# Patient Record
Sex: MALE | NOT HISPANIC OR LATINO | URBAN - METROPOLITAN AREA
[De-identification: names, ages, dates, MRNs, and addresses within clinical notes are randomized per-mention and may not be internally consistent; named-entity substitution may affect disease eponyms.]

---

## 2018-08-17 ENCOUNTER — EMERGENCY (EMERGENCY)
Facility: HOSPITAL | Age: 58
LOS: 1 days | Discharge: HOME | End: 2018-08-17
Admitting: EMERGENCY MEDICINE
Payer: COMMERCIAL

## 2018-08-17 VITALS
SYSTOLIC BLOOD PRESSURE: 198 MMHG | OXYGEN SATURATION: 98 % | DIASTOLIC BLOOD PRESSURE: 124 MMHG | TEMPERATURE: 98 F | RESPIRATION RATE: 16 BRPM | HEART RATE: 116 BPM | WEIGHT: 220.02 LBS

## 2018-08-17 DIAGNOSIS — R00.0 TACHYCARDIA, UNSPECIFIED: ICD-10-CM

## 2018-08-17 DIAGNOSIS — I10 ESSENTIAL (PRIMARY) HYPERTENSION: ICD-10-CM

## 2018-08-17 DIAGNOSIS — I16.0 HYPERTENSIVE URGENCY: ICD-10-CM

## 2018-08-17 DIAGNOSIS — M27.8 OTHER SPECIFIED DISEASES OF JAWS: ICD-10-CM

## 2018-08-17 LAB
ALBUMIN SERPL ELPH-MCNC: 3.9 G/DL — SIGNIFICANT CHANGE UP (ref 3.4–5)
ALP SERPL-CCNC: 60 U/L — SIGNIFICANT CHANGE UP (ref 40–120)
ALT FLD-CCNC: 31 U/L — SIGNIFICANT CHANGE UP (ref 12–42)
AMYLASE P1 CFR SERPL: 29 U/L — SIGNIFICANT CHANGE UP (ref 25–115)
ANION GAP SERPL CALC-SCNC: 9 MMOL/L — SIGNIFICANT CHANGE UP (ref 9–16)
APPEARANCE UR: CLEAR — SIGNIFICANT CHANGE UP
AST SERPL-CCNC: 26 U/L — SIGNIFICANT CHANGE UP (ref 15–37)
BASOPHILS NFR BLD AUTO: 0.6 % — SIGNIFICANT CHANGE UP (ref 0–2)
BILIRUB SERPL-MCNC: 0.4 MG/DL — SIGNIFICANT CHANGE UP (ref 0.2–1.2)
BILIRUB UR-MCNC: NEGATIVE — SIGNIFICANT CHANGE UP
BUN SERPL-MCNC: 17 MG/DL — SIGNIFICANT CHANGE UP (ref 7–23)
CALCIUM SERPL-MCNC: 8.9 MG/DL — SIGNIFICANT CHANGE UP (ref 8.5–10.5)
CHLORIDE SERPL-SCNC: 105 MMOL/L — SIGNIFICANT CHANGE UP (ref 96–108)
CO2 SERPL-SCNC: 28 MMOL/L — SIGNIFICANT CHANGE UP (ref 22–31)
COLOR SPEC: YELLOW — SIGNIFICANT CHANGE UP
CREAT SERPL-MCNC: 1.21 MG/DL — SIGNIFICANT CHANGE UP (ref 0.5–1.3)
D DIMER BLD IA.RAPID-MCNC: <187 NG/ML DDU — SIGNIFICANT CHANGE UP
DIFF PNL FLD: ABNORMAL
EOSINOPHIL NFR BLD AUTO: 0.6 % — SIGNIFICANT CHANGE UP (ref 0–6)
GLUCOSE SERPL-MCNC: 107 MG/DL — HIGH (ref 70–99)
GLUCOSE UR QL: NEGATIVE — SIGNIFICANT CHANGE UP
HCT VFR BLD CALC: 41.9 % — SIGNIFICANT CHANGE UP (ref 39–50)
HGB BLD-MCNC: 14.7 G/DL — SIGNIFICANT CHANGE UP (ref 13–17)
IMM GRANULOCYTES NFR BLD AUTO: 0.3 % — SIGNIFICANT CHANGE UP (ref 0–1.5)
KETONES UR-MCNC: ABNORMAL MG/DL
LEUKOCYTE ESTERASE UR-ACNC: NEGATIVE — SIGNIFICANT CHANGE UP
LYMPHOCYTES # BLD AUTO: 27.5 % — SIGNIFICANT CHANGE UP (ref 13–44)
MCHC RBC-ENTMCNC: 31.7 PG — SIGNIFICANT CHANGE UP (ref 27–34)
MCHC RBC-ENTMCNC: 35.1 G/DL — SIGNIFICANT CHANGE UP (ref 32–36)
MCV RBC AUTO: 90.3 FL — SIGNIFICANT CHANGE UP (ref 80–100)
MONOCYTES NFR BLD AUTO: 8.3 % — SIGNIFICANT CHANGE UP (ref 2–14)
NEUTROPHILS NFR BLD AUTO: 62.7 % — SIGNIFICANT CHANGE UP (ref 43–77)
NITRITE UR-MCNC: NEGATIVE — SIGNIFICANT CHANGE UP
PH UR: 6 — SIGNIFICANT CHANGE UP (ref 5–8)
PLATELET # BLD AUTO: 230 K/UL — SIGNIFICANT CHANGE UP (ref 150–400)
POTASSIUM SERPL-MCNC: 3.3 MMOL/L — LOW (ref 3.5–5.3)
POTASSIUM SERPL-SCNC: 3.3 MMOL/L — LOW (ref 3.5–5.3)
PROT SERPL-MCNC: 7.9 G/DL — SIGNIFICANT CHANGE UP (ref 6.4–8.2)
PROT UR-MCNC: NEGATIVE MG/DL — SIGNIFICANT CHANGE UP
RBC # BLD: 4.64 M/UL — SIGNIFICANT CHANGE UP (ref 4.2–5.8)
RBC # FLD: 12.5 % — SIGNIFICANT CHANGE UP (ref 10.3–16.9)
SODIUM SERPL-SCNC: 142 MMOL/L — SIGNIFICANT CHANGE UP (ref 132–145)
SP GR SPEC: 1.02 — SIGNIFICANT CHANGE UP (ref 1–1.03)
TROPONIN I SERPL-MCNC: <0.017 NG/ML — LOW (ref 0.02–0.06)
UROBILINOGEN FLD QL: 0.2 E.U./DL — SIGNIFICANT CHANGE UP
WBC # BLD: 7 K/UL — SIGNIFICANT CHANGE UP (ref 3.8–10.5)
WBC # FLD AUTO: 7 K/UL — SIGNIFICANT CHANGE UP (ref 3.8–10.5)

## 2018-08-17 PROCEDURE — 71046 X-RAY EXAM CHEST 2 VIEWS: CPT | Mod: 26

## 2018-08-17 PROCEDURE — 70491 CT SOFT TISSUE NECK W/DYE: CPT | Mod: 26

## 2018-08-17 PROCEDURE — 99285 EMERGENCY DEPT VISIT HI MDM: CPT | Mod: 25

## 2018-08-17 PROCEDURE — 93010 ELECTROCARDIOGRAM REPORT: CPT

## 2018-08-17 RX ORDER — LABETALOL HCL 100 MG
1 TABLET ORAL
Qty: 30 | Refills: 0
Start: 2018-08-17 | End: 2018-08-31

## 2018-08-17 RX ORDER — LABETALOL HCL 100 MG
20 TABLET ORAL ONCE
Qty: 0 | Refills: 0 | Status: COMPLETED | OUTPATIENT
Start: 2018-08-17 | End: 2018-08-17

## 2018-08-17 RX ADMIN — Medication 20 MILLIGRAM(S): at 22:43

## 2018-08-17 NOTE — ED PROVIDER NOTE - DIAGNOSTIC INTERPRETATION
Xray (wet reads) interpreted by MILLIE HENRY   CXR - Cardiac silhouette, aortic knob, mediastinal and hilar contours appear wnl, no acute consolidation, infiltrate, effusion, or PTX. No bony abnormalities noted Xray (wet reads) interpreted by MILLIE HENRY   CXR - Cardiac silhouette, aortic knob, mediastinal and hilar contours appear wnl, +mild LLL opacity, no consolidation, infiltrate, effusion, or PTX. No bony abnormalities noted

## 2018-08-17 NOTE — ED PROVIDER NOTE - OBJECTIVE STATEMENT
59 yo M with PMHx of depression, sent from  for evaluation of elevated BP and R jaw swelling. 59 yo M with PMHx of depression, sent from  for evaluation of elevated BP and R jaw swelling. Pt reports having gradual onset of R jaw swelling with minimal tenderness in the past month after a R lower jaw wisdom tooth extraction almost 2 months ago.  Was seen by private dentist one wk ago, started on amoxicillin for potential seroma with no improvement in sx. Went to  and noted elevated BP and sent in for further evaluation.  Denies fever, chills, CP, palpitations, diaphoresis, CONNER, SOB, orthopnea, cough, hemoptysis, wheezing, peripheral edema, focal weakness, numbness, tingling, paresthesia, HA, dizziness, neck pain, N/V/D/C, abdominal pain, change in urinary/bowel function, trauma, fall, rash, and malaise. No recent travel or sick contact noted.  Pt is adopted and reports no known family history.  Last PMD wellness visit 10 yrs ago

## 2018-08-17 NOTE — ED PROVIDER NOTE - MEDICAL DECISION MAKING DETAILS
AFVSS at time of d/c, pt non-toxic appearing, results, ddx, and f/u plans discussed with pt at bedside, d/c'd home to f/u with PMD, strict return precautions discussed, prompt return to ER for any worsening or new sx, pt verbalized understanding. pt with R jaw mass x 1 month s/p wisdom tooth extraction 2 months ago, afebrile and non toxic appearing, found to be persistently hypertensive in UC and in the ED, no s/s of end organ involvement, EKG with sinus tachycardia, ?anxiety induced vs baseline, improved s/p monitoring and labetalol, CT neck with +collection in the deep soft tissue region in the R jaw, pt has been afebrile and no leukocytosis, already on augmentin by private dentist, likely seroma, encouraged prompt f/u with oral surgeon/dentist for drainage, dc home on course of labetalol and clindamycin, strict return precautions discussed, prompt return to ER for any worsening or new sx, pt verbalized understanding. pt with R jaw mass x 1 month s/p wisdom tooth extraction 2 months ago, afebrile and non toxic appearing, found to be persistently hypertensive in UC and in the ED, no s/s of end organ involvement, EKG with sinus tachycardia, ?anxiety induced vs baseline, improved s/p monitoring and labetalol, CT neck with +collection in the deep soft tissue region in the R jaw, pt has been afebrile and no leukocytosis, already on augmentin by private dentist, unlikely to be abscess, cxr with lesion noted in the L lung base, mandibular region with bony involvement, likely undx malignancy, ddx and f/u plan discussed in detail w pt, encouraged prompt f/u with oral surgeon and PMD, dc home on course of labetalol, strict return precautions discussed, prompt return to ER for any worsening or new sx, pt verbalized understanding.

## 2018-08-17 NOTE — ED PROVIDER NOTE - CARE PLAN
Principal Discharge DX:	Hypertensive urgency Principal Discharge DX:	Hypertensive urgency  Secondary Diagnosis:	Mandibular abscess Principal Discharge DX:	Hypertensive urgency  Secondary Diagnosis:	Mandibular mass

## 2018-08-17 NOTE — ED ADULT TRIAGE NOTE - CHIEF COMPLAINT QUOTE
PT sent in by urgent care for high blood pressure. Pt currently 198/124, denies history of high blood pressure. Denies headache, dizziness, blurry vision. , states he drinks "about a pot of coffee" everyday. Pt also with "bump" on right lower jaw from area where he had a tooth extraction, states he is currently on amoxicillin from his dentist. Denies any fevers or chills.

## 2018-08-17 NOTE — ED PROVIDER NOTE - PHYSICAL EXAMINATION
Vital Signs - nursing notes reviewed and confirmed  Gen - WDWN, NAD, comfortable and non-toxic appearing, speaking in full sentences   Skin - warm, dry, intact  HEENT - AT/NC, PERRL, EOMI, no conjunctival injection, moist oral mucosa, o/p clear with no erythema, edema, or exudate, uvula midline, airway patent, neck supple and NT, FROM, no JVD or carotid bruits b/l, +R submandibular palpable mass, semi firm, mobile, no focal fluctuance, d/c, bleeding, warmth or tenderness   CV - S1S2, rapid rate, regular rhythm, no m/r/g   Resp - respiration non-labored, CTAB, symmetric bs b/l, no r/r/w  GI - NABS, soft, ND, NT, no rebound or guarding, no CVAT b/l   MS - w/w/p, no c/c/e, calves supple and NT, distal pulses symmetric b/l, brisk cap refills   Neuro - AxOx3, no focal neuro deficits, CN II-XII grossly intact, cerebellar function intact, negative pronator drift, negative nystagmus, ambulatory without gait disturbance

## 2018-08-18 ENCOUNTER — RESULT REVIEW (OUTPATIENT)
Age: 58
End: 2018-08-18

## 2018-08-18 VITALS
SYSTOLIC BLOOD PRESSURE: 170 MMHG | RESPIRATION RATE: 18 BRPM | OXYGEN SATURATION: 96 % | DIASTOLIC BLOOD PRESSURE: 108 MMHG | HEART RATE: 94 BPM

## 2018-08-18 PROCEDURE — 71260 CT THORAX DX C+: CPT | Mod: 26

## 2018-08-20 PROBLEM — F32.9 MAJOR DEPRESSIVE DISORDER, SINGLE EPISODE, UNSPECIFIED: Chronic | Status: ACTIVE | Noted: 2018-08-17

## 2018-08-27 ENCOUNTER — TRANSCRIPTION ENCOUNTER (OUTPATIENT)
Age: 58
End: 2018-08-27

## 2018-08-27 ENCOUNTER — NON-APPOINTMENT (OUTPATIENT)
Age: 58
End: 2018-08-27

## 2018-08-27 ENCOUNTER — APPOINTMENT (OUTPATIENT)
Dept: INTERNAL MEDICINE | Facility: CLINIC | Age: 58
End: 2018-08-27
Payer: COMMERCIAL

## 2018-08-27 VITALS
TEMPERATURE: 98 F | DIASTOLIC BLOOD PRESSURE: 105 MMHG | WEIGHT: 229.25 LBS | HEART RATE: 117 BPM | BODY MASS INDEX: 29.74 KG/M2 | OXYGEN SATURATION: 95 % | SYSTOLIC BLOOD PRESSURE: 183 MMHG | HEIGHT: 73.5 IN

## 2018-08-27 VITALS — DIASTOLIC BLOOD PRESSURE: 100 MMHG | SYSTOLIC BLOOD PRESSURE: 160 MMHG

## 2018-08-27 DIAGNOSIS — Z78.9 OTHER SPECIFIED HEALTH STATUS: ICD-10-CM

## 2018-08-27 PROCEDURE — 93000 ELECTROCARDIOGRAM COMPLETE: CPT

## 2018-08-27 PROCEDURE — 99204 OFFICE O/P NEW MOD 45 MIN: CPT | Mod: 25

## 2018-08-27 RX ORDER — AMOXICILLIN 500 MG/1
500 CAPSULE ORAL
Refills: 0 | Status: COMPLETED | COMMUNITY

## 2018-08-27 NOTE — PHYSICAL EXAM
[No Acute Distress] : no acute distress [Well Nourished] : well nourished [Well Developed] : well developed [Well-Appearing] : well-appearing [Normal Sclera/Conjunctiva] : normal sclera/conjunctiva [PERRL] : pupils equal round and reactive to light [EOMI] : extraocular movements intact [Normal Outer Ear/Nose] : the outer ears and nose were normal in appearance [Normal Oropharynx] : the oropharynx was normal [No JVD] : no jugular venous distention [Supple] : supple [No Lymphadenopathy] : no lymphadenopathy [Thyroid Normal, No Nodules] : the thyroid was normal and there were no nodules present [No Respiratory Distress] : no respiratory distress  [Clear to Auscultation] : lungs were clear to auscultation bilaterally [No Accessory Muscle Use] : no accessory muscle use [Normal Rate] : normal rate  [Regular Rhythm] : with a regular rhythm [Normal S1, S2] : normal S1 and S2 [No Murmur] : no murmur heard [No Carotid Bruits] : no carotid bruits [No Abdominal Bruit] : a ~M bruit was not heard ~T in the abdomen [No Edema] : there was no peripheral edema [Soft] : abdomen soft [Non Tender] : non-tender [Non-distended] : non-distended [No Masses] : no abdominal mass palpated [No HSM] : no HSM [Normal Bowel Sounds] : normal bowel sounds [Normal Posterior Cervical Nodes] : no posterior cervical lymphadenopathy [Normal Anterior Cervical Nodes] : no anterior cervical lymphadenopathy [No CVA Tenderness] : no CVA  tenderness [No Spinal Tenderness] : no spinal tenderness [No Joint Swelling] : no joint swelling [Grossly Normal Strength/Tone] : grossly normal strength/tone [No Rash] : no rash [Normal Gait] : normal gait [Coordination Grossly Intact] : coordination grossly intact [No Focal Deficits] : no focal deficits [Deep Tendon Reflexes (DTR)] : deep tendon reflexes were 2+ and symmetric [Normal Affect] : the affect was normal [Normal Insight/Judgement] : insight and judgment were intact [de-identified] : right molar teeth incision scar [de-identified] : right submandibular mass present larger than 4 cm

## 2018-08-27 NOTE — DATA REVIEWED
[FreeTextEntry1] : CT Head\par Destructive lytic lesion centered in angle of the right mandible with  associated permeative destructive changes as well as surrounding soft  tissue density, concerning for malignancy. No defined fluid collection is  seen.\par \par CT Chest:\par 1.5 x 0.9 cm pleural nodule left upper lobe posteriorly concerning for  metastatic lesion. 2.6 cm lingular soft tissue nodule metastatic or primary lung

## 2018-08-27 NOTE — REVIEW OF SYSTEMS
[Fatigue] : no fatigue [Night Sweats] : no night sweats [Recent Change In Weight] : ~T no recent weight change [Anxiety] : anxiety [Negative] : Heme/Lymph [FreeTextEntry4] : right mandibular lesion

## 2018-08-27 NOTE — HISTORY OF PRESENT ILLNESS
[FreeTextEntry8] : 58 year old male patient came in to establish care and for coordination of care of new health issues. \par \par Patient was recently evaluated by his dentist for a cheek lesion on the right side. he was given an antibiotic initially, he later went into the urgent care for second evaluation. he was later sent to ED for the cheek lesion and hypertension. in the Ed he received a CT Scan of the Head which he was told was reassuring.  He also had a chest Xray in the emergency department that showed a nodule in the lung. he later got a follow up study with a CT Scan which came back suspicious for malignancy lesions. \par \par After discharge he went to see a maxiofascial surgeon and had a bone biopsy of the mandible per recommendation of his dentist. Patient has an appt coming up tomorrow with the surgeon to discuss results. \par \par Of note throughout the visits with these healthcare providers patient is told that he had elevated blood pressure. Patient is not aware that he had this issue before. he denies headaches, vision disturbances. \par \par Patient reports he has anxiety and he is going through therapy to control symptoms. He admits of having good social support.  \par

## 2018-08-29 ENCOUNTER — TRANSCRIPTION ENCOUNTER (OUTPATIENT)
Age: 58
End: 2018-08-29

## 2018-08-29 ENCOUNTER — APPOINTMENT (OUTPATIENT)
Dept: OTOLARYNGOLOGY | Facility: CLINIC | Age: 58
End: 2018-08-29
Payer: COMMERCIAL

## 2018-08-29 ENCOUNTER — APPOINTMENT (OUTPATIENT)
Dept: PULMONOLOGY | Facility: CLINIC | Age: 58
End: 2018-08-29
Payer: COMMERCIAL

## 2018-08-29 VITALS
OXYGEN SATURATION: 94 % | HEIGHT: 73 IN | DIASTOLIC BLOOD PRESSURE: 90 MMHG | BODY MASS INDEX: 29.82 KG/M2 | HEART RATE: 107 BPM | WEIGHT: 225 LBS | TEMPERATURE: 98.4 F | SYSTOLIC BLOOD PRESSURE: 147 MMHG

## 2018-08-29 VITALS
OXYGEN SATURATION: 94 % | SYSTOLIC BLOOD PRESSURE: 147 MMHG | DIASTOLIC BLOOD PRESSURE: 90 MMHG | WEIGHT: 225 LBS | BODY MASS INDEX: 29.82 KG/M2 | TEMPERATURE: 98.4 F | HEART RATE: 107 BPM | HEIGHT: 73 IN

## 2018-08-29 PROCEDURE — 31231 NASAL ENDOSCOPY DX: CPT

## 2018-08-29 PROCEDURE — 99205 OFFICE O/P NEW HI 60 MIN: CPT | Mod: 25

## 2018-08-29 PROCEDURE — 99244 OFF/OP CNSLTJ NEW/EST MOD 40: CPT

## 2018-08-30 ENCOUNTER — TRANSCRIPTION ENCOUNTER (OUTPATIENT)
Age: 58
End: 2018-08-30

## 2018-09-01 ENCOUNTER — TRANSCRIPTION ENCOUNTER (OUTPATIENT)
Age: 58
End: 2018-09-01

## 2018-09-04 ENCOUNTER — TRANSCRIPTION ENCOUNTER (OUTPATIENT)
Age: 58
End: 2018-09-04

## 2018-09-04 ENCOUNTER — APPOINTMENT (OUTPATIENT)
Dept: OTOLARYNGOLOGY | Facility: CLINIC | Age: 58
End: 2018-09-04
Payer: COMMERCIAL

## 2018-09-04 VITALS
SYSTOLIC BLOOD PRESSURE: 153 MMHG | WEIGHT: 225 LBS | DIASTOLIC BLOOD PRESSURE: 93 MMHG | HEIGHT: 73 IN | BODY MASS INDEX: 29.82 KG/M2 | TEMPERATURE: 98.8 F | OXYGEN SATURATION: 95 % | HEART RATE: 108 BPM

## 2018-09-04 PROCEDURE — 99215 OFFICE O/P EST HI 40 MIN: CPT

## 2018-09-05 ENCOUNTER — OUTPATIENT (OUTPATIENT)
Dept: OUTPATIENT SERVICES | Facility: HOSPITAL | Age: 58
LOS: 1 days | End: 2018-09-05
Payer: COMMERCIAL

## 2018-09-05 ENCOUNTER — OUTPATIENT (OUTPATIENT)
Dept: OUTPATIENT SERVICES | Facility: HOSPITAL | Age: 58
LOS: 1 days | Discharge: HOME | End: 2018-09-05

## 2018-09-05 ENCOUNTER — LABORATORY RESULT (OUTPATIENT)
Age: 58
End: 2018-09-05

## 2018-09-05 DIAGNOSIS — C43.39 MALIGNANT MELANOMA OF OTHER PARTS OF FACE: ICD-10-CM

## 2018-09-05 PROCEDURE — 82962 GLUCOSE BLOOD TEST: CPT

## 2018-09-05 PROCEDURE — A9552: CPT

## 2018-09-05 PROCEDURE — 78815 PET IMAGE W/CT SKULL-THIGH: CPT

## 2018-09-05 PROCEDURE — 78815 PET IMAGE W/CT SKULL-THIGH: CPT | Mod: 26

## 2018-09-06 ENCOUNTER — APPOINTMENT (OUTPATIENT)
Dept: DERMATOLOGY | Facility: CLINIC | Age: 58
End: 2018-09-06
Payer: COMMERCIAL

## 2018-09-06 VITALS — SYSTOLIC BLOOD PRESSURE: 140 MMHG | DIASTOLIC BLOOD PRESSURE: 100 MMHG

## 2018-09-06 PROCEDURE — 99203 OFFICE O/P NEW LOW 30 MIN: CPT | Mod: 25

## 2018-09-06 PROCEDURE — 11100 BX SKIN SUBCUTANEOUS&/MUCOUS MEMBRANE 1 LESION: CPT

## 2018-09-06 PROCEDURE — 11101 BIOPSY SKIN SUBQ&/MUCOUS MEMBRANE EA ADDL LESN: CPT

## 2018-09-12 ENCOUNTER — TRANSCRIPTION ENCOUNTER (OUTPATIENT)
Age: 58
End: 2018-09-12

## 2018-09-13 ENCOUNTER — TRANSCRIPTION ENCOUNTER (OUTPATIENT)
Age: 58
End: 2018-09-13

## 2018-09-14 ENCOUNTER — TRANSCRIPTION ENCOUNTER (OUTPATIENT)
Age: 58
End: 2018-09-14

## 2018-09-14 ENCOUNTER — APPOINTMENT (OUTPATIENT)
Dept: HEMATOLOGY ONCOLOGY | Facility: CLINIC | Age: 58
End: 2018-09-14
Payer: COMMERCIAL

## 2018-09-14 VITALS
OXYGEN SATURATION: 98 % | BODY MASS INDEX: 29.48 KG/M2 | HEIGHT: 73.23 IN | RESPIRATION RATE: 16 BRPM | WEIGHT: 224.87 LBS | DIASTOLIC BLOOD PRESSURE: 100 MMHG | TEMPERATURE: 98.8 F | SYSTOLIC BLOOD PRESSURE: 160 MMHG | HEART RATE: 101 BPM

## 2018-09-14 PROCEDURE — 99245 OFF/OP CONSLTJ NEW/EST HI 55: CPT

## 2018-09-17 ENCOUNTER — TRANSCRIPTION ENCOUNTER (OUTPATIENT)
Age: 58
End: 2018-09-17

## 2018-09-17 ENCOUNTER — CHART COPY (OUTPATIENT)
Age: 58
End: 2018-09-17

## 2018-09-17 ENCOUNTER — APPOINTMENT (OUTPATIENT)
Dept: INTERNAL MEDICINE | Facility: CLINIC | Age: 58
End: 2018-09-17
Payer: COMMERCIAL

## 2018-09-17 VITALS
OXYGEN SATURATION: 97 % | TEMPERATURE: 98.8 F | HEART RATE: 162 BPM | DIASTOLIC BLOOD PRESSURE: 80 MMHG | HEIGHT: 73.23 IN | SYSTOLIC BLOOD PRESSURE: 140 MMHG | BODY MASS INDEX: 29.37 KG/M2 | WEIGHT: 224 LBS

## 2018-09-17 PROCEDURE — 99214 OFFICE O/P EST MOD 30 MIN: CPT

## 2018-09-18 ENCOUNTER — APPOINTMENT (OUTPATIENT)
Dept: PULMONOLOGY | Facility: CLINIC | Age: 58
End: 2018-09-18
Payer: COMMERCIAL

## 2018-09-18 ENCOUNTER — APPOINTMENT (OUTPATIENT)
Dept: OTOLARYNGOLOGY | Facility: CLINIC | Age: 58
End: 2018-09-18
Payer: COMMERCIAL

## 2018-09-18 VITALS
OXYGEN SATURATION: 95 % | BODY MASS INDEX: 25.92 KG/M2 | HEART RATE: 107 BPM | WEIGHT: 224 LBS | DIASTOLIC BLOOD PRESSURE: 89 MMHG | HEIGHT: 78 IN | TEMPERATURE: 98.3 F | SYSTOLIC BLOOD PRESSURE: 160 MMHG

## 2018-09-18 VITALS
HEIGHT: 78 IN | WEIGHT: 224 LBS | TEMPERATURE: 98.9 F | HEART RATE: 110 BPM | BODY MASS INDEX: 25.92 KG/M2 | OXYGEN SATURATION: 93 %

## 2018-09-18 DIAGNOSIS — J38.3 OTHER DISEASES OF VOCAL CORDS: ICD-10-CM

## 2018-09-18 PROCEDURE — 99214 OFFICE O/P EST MOD 30 MIN: CPT

## 2018-09-18 NOTE — PHYSICAL EXAM
[No Acute Distress] : no acute distress [Well Nourished] : well nourished [Normal Sclera/Conjunctiva] : normal sclera/conjunctiva [PERRL] : pupils equal round and reactive to light [No JVD] : no jugular venous distention [Supple] : supple [No Respiratory Distress] : no respiratory distress  [No Accessory Muscle Use] : no accessory muscle use [Normal Rate] : normal rate  [Regular Rhythm] : with a regular rhythm [No Joint Swelling] : no joint swelling [Grossly Normal Strength/Tone] : grossly normal strength/tone [No Rash] : no rash [Normal Gait] : normal gait [Coordination Grossly Intact] : coordination grossly intact [Normal Affect] : the affect was normal [Normal Insight/Judgement] : insight and judgment were intact

## 2018-09-18 NOTE — HISTORY OF PRESENT ILLNESS
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 \par \par Patient was seen at Dr Barnes''s office. He is offered treatment modalities. Per self report he is offered immunotherapy or surgery and combination. Patient is still doing his research and weighing cons and pros of the choices and planning to make a decision. \par \par Patient had two biopsies of skin lesion of his face and right arm which both came benign and not likely representing the source.\par \par Patient had lesions on his lungs which were suspicious for metastatic lesions but PET scan results were not clear on etiology. He was recommended by Pulmonary and Hematology to obtain tissue diagnosis to identify these lesions. Patient has an upcoming appointment with Dr Scott for evaluation and is concerned if he needs to have surgery to access these lesions. \par \par PAtient otherwise reports he is in good spirits. he admits anxiety but is eager to start the treatment process. \par \par patient had a PET scan done.

## 2018-09-19 ENCOUNTER — APPOINTMENT (OUTPATIENT)
Dept: OTOLARYNGOLOGY | Facility: CLINIC | Age: 58
End: 2018-09-19
Payer: COMMERCIAL

## 2018-09-19 VITALS
WEIGHT: 224 LBS | DIASTOLIC BLOOD PRESSURE: 100 MMHG | TEMPERATURE: 99 F | SYSTOLIC BLOOD PRESSURE: 148 MMHG | BODY MASS INDEX: 29.69 KG/M2 | HEIGHT: 73 IN | HEART RATE: 111 BPM | OXYGEN SATURATION: 96 %

## 2018-09-19 PROCEDURE — 99213 OFFICE O/P EST LOW 20 MIN: CPT

## 2018-09-28 ENCOUNTER — RESULT REVIEW (OUTPATIENT)
Age: 58
End: 2018-09-28

## 2018-09-28 ENCOUNTER — OUTPATIENT (OUTPATIENT)
Dept: OUTPATIENT SERVICES | Facility: HOSPITAL | Age: 58
LOS: 1 days | Discharge: HOME | End: 2018-09-28
Payer: COMMERCIAL

## 2018-09-28 ENCOUNTER — APPOINTMENT (OUTPATIENT)
Dept: HEMATOLOGY ONCOLOGY | Facility: CLINIC | Age: 58
End: 2018-09-28
Payer: COMMERCIAL

## 2018-09-28 PROCEDURE — 31628 BRONCHOSCOPY/LUNG BX EACH: CPT

## 2018-09-28 PROCEDURE — 71045 X-RAY EXAM CHEST 1 VIEW: CPT | Mod: 26

## 2018-09-28 PROCEDURE — 31652 BRONCH EBUS SAMPLNG 1/2 NODE: CPT

## 2018-09-28 PROCEDURE — 88173 CYTOPATH EVAL FNA REPORT: CPT

## 2018-09-28 PROCEDURE — 71045 X-RAY EXAM CHEST 1 VIEW: CPT

## 2018-09-28 PROCEDURE — 31624 DX BRONCHOSCOPE/LAVAGE: CPT | Mod: GC

## 2018-09-28 PROCEDURE — 88305 TISSUE EXAM BY PATHOLOGIST: CPT

## 2018-09-28 PROCEDURE — 31654 BRONCH EBUS IVNTJ PERPH LES: CPT | Mod: GC

## 2018-09-28 PROCEDURE — 88341 IMHCHEM/IMCYTCHM EA ADD ANTB: CPT

## 2018-09-28 PROCEDURE — 88112 CYTOPATH CELL ENHANCE TECH: CPT

## 2018-09-28 PROCEDURE — 31627 NAVIGATIONAL BRONCHOSCOPY: CPT | Mod: GC

## 2018-09-28 PROCEDURE — 31652 BRONCH EBUS SAMPLNG 1/2 NODE: CPT | Mod: GC

## 2018-09-28 PROCEDURE — 31625 BRONCHOSCOPY W/BIOPSY(S): CPT | Mod: GC

## 2018-10-01 ENCOUNTER — RESULT REVIEW (OUTPATIENT)
Age: 58
End: 2018-10-01

## 2018-10-01 ENCOUNTER — OUTPATIENT (OUTPATIENT)
Dept: OUTPATIENT SERVICES | Facility: HOSPITAL | Age: 58
LOS: 1 days | End: 2018-10-01

## 2018-10-01 ENCOUNTER — APPOINTMENT (OUTPATIENT)
Dept: THORACIC SURGERY | Facility: CLINIC | Age: 58
End: 2018-10-01
Payer: COMMERCIAL

## 2018-10-01 VITALS
DIASTOLIC BLOOD PRESSURE: 99 MMHG | WEIGHT: 217 LBS | HEART RATE: 118 BPM | TEMPERATURE: 98.8 F | OXYGEN SATURATION: 96 % | BODY MASS INDEX: 28.76 KG/M2 | HEIGHT: 73 IN | SYSTOLIC BLOOD PRESSURE: 151 MMHG | RESPIRATION RATE: 16 BRPM

## 2018-10-01 DIAGNOSIS — C41.1 MALIGNANT NEOPLASM OF MANDIBLE: ICD-10-CM

## 2018-10-01 LAB — SURGICAL PATHOLOGY STUDY: SIGNIFICANT CHANGE UP

## 2018-10-01 PROCEDURE — 99245 OFF/OP CONSLTJ NEW/EST HI 55: CPT

## 2018-10-02 ENCOUNTER — APPOINTMENT (OUTPATIENT)
Dept: HEMATOLOGY ONCOLOGY | Facility: CLINIC | Age: 58
End: 2018-10-02
Payer: COMMERCIAL

## 2018-10-02 ENCOUNTER — RESULT REVIEW (OUTPATIENT)
Age: 58
End: 2018-10-02

## 2018-10-02 VITALS
TEMPERATURE: 99 F | HEART RATE: 106 BPM | WEIGHT: 218.25 LBS | DIASTOLIC BLOOD PRESSURE: 105 MMHG | OXYGEN SATURATION: 97 % | SYSTOLIC BLOOD PRESSURE: 167 MMHG | BODY MASS INDEX: 28.79 KG/M2 | RESPIRATION RATE: 17 BRPM

## 2018-10-02 DIAGNOSIS — Z86.79 PERSONAL HISTORY OF OTHER DISEASES OF THE CIRCULATORY SYSTEM: ICD-10-CM

## 2018-10-02 DIAGNOSIS — Z82.49 FAMILY HISTORY OF ISCHEMIC HEART DISEASE AND OTHER DISEASES OF THE CIRCULATORY SYSTEM: ICD-10-CM

## 2018-10-02 LAB
HCT VFR BLD CALC: 43.1 % — SIGNIFICANT CHANGE UP (ref 39–50)
HGB BLD-MCNC: 15.1 G/DL — SIGNIFICANT CHANGE UP (ref 13–17)
MCHC RBC-ENTMCNC: 32.2 PG — SIGNIFICANT CHANGE UP (ref 27–34)
MCHC RBC-ENTMCNC: 35 G/DL — SIGNIFICANT CHANGE UP (ref 32–36)
MCV RBC AUTO: 92.3 FL — SIGNIFICANT CHANGE UP (ref 80–100)
PLATELET # BLD AUTO: 217 K/UL — SIGNIFICANT CHANGE UP (ref 150–400)
RBC # BLD: 4.68 M/UL — SIGNIFICANT CHANGE UP (ref 4.2–5.8)
RBC # FLD: 11.7 % — SIGNIFICANT CHANGE UP (ref 10.3–14.5)
WBC # BLD: 7 K/UL — SIGNIFICANT CHANGE UP (ref 3.8–10.5)
WBC # FLD AUTO: 7 K/UL — SIGNIFICANT CHANGE UP (ref 3.8–10.5)

## 2018-10-02 PROCEDURE — 99215 OFFICE O/P EST HI 40 MIN: CPT

## 2018-10-03 ENCOUNTER — OUTPATIENT (OUTPATIENT)
Dept: OUTPATIENT SERVICES | Facility: HOSPITAL | Age: 58
LOS: 1 days | Discharge: HOME | End: 2018-10-03

## 2018-10-03 DIAGNOSIS — C43.39 MALIGNANT MELANOMA OF OTHER PARTS OF FACE: ICD-10-CM

## 2018-10-03 LAB
ALBUMIN SERPL ELPH-MCNC: 4.5 G/DL
ALP BLD-CCNC: 64 U/L
ALT SERPL-CCNC: 20 U/L
ANION GAP SERPL CALC-SCNC: 13 MMOL/L
AST SERPL-CCNC: 19 U/L
BILIRUB SERPL-MCNC: 0.4 MG/DL
BUN SERPL-MCNC: 16 MG/DL
CALCIUM SERPL-MCNC: 9.5 MG/DL
CHLORIDE SERPL-SCNC: 101 MMOL/L
CO2 SERPL-SCNC: 26 MMOL/L
CREAT SERPL-MCNC: 1.06 MG/DL
GLUCOSE SERPL-MCNC: 104 MG/DL
HAV IGM SER QL: NONREACTIVE
HBV CORE IGM SER QL: NONREACTIVE
HBV SURFACE AG SER QL: NONREACTIVE
HCV AB SER QL: NONREACTIVE
HCV S/CO RATIO: 0.2 S/CO
POTASSIUM SERPL-SCNC: 4.3 MMOL/L
PROT SERPL-MCNC: 7 G/DL
SODIUM SERPL-SCNC: 140 MMOL/L
T3FREE SERPL-MCNC: 3.74 PG/ML
T4 FREE SERPL-MCNC: 1.7 NG/DL
TSH SERPL-ACNC: 0.7 UIU/ML

## 2018-10-08 LAB
CORTICOSTEROID BIND GLOBULIN: 2.8 MG/DL
CORTIS SERPL-MCNC: 14 UG/DL
CORTISOL, FREE: 0.79 UG/DL
PFCX: 5.6 %

## 2018-10-11 ENCOUNTER — APPOINTMENT (OUTPATIENT)
Dept: INFUSION THERAPY | Facility: HOSPITAL | Age: 58
End: 2018-10-11

## 2018-10-12 DIAGNOSIS — R11.2 NAUSEA WITH VOMITING, UNSPECIFIED: ICD-10-CM

## 2018-10-12 DIAGNOSIS — C43.9 MALIGNANT MELANOMA OF SKIN, UNSPECIFIED: ICD-10-CM

## 2018-10-12 DIAGNOSIS — Z51.11 ENCOUNTER FOR ANTINEOPLASTIC CHEMOTHERAPY: ICD-10-CM

## 2018-10-26 ENCOUNTER — LABORATORY RESULT (OUTPATIENT)
Age: 58
End: 2018-10-26

## 2018-10-26 ENCOUNTER — APPOINTMENT (OUTPATIENT)
Dept: HEMATOLOGY ONCOLOGY | Facility: CLINIC | Age: 58
End: 2018-10-26
Payer: COMMERCIAL

## 2018-10-26 ENCOUNTER — RESULT REVIEW (OUTPATIENT)
Age: 58
End: 2018-10-26

## 2018-10-26 VITALS
TEMPERATURE: 98.4 F | DIASTOLIC BLOOD PRESSURE: 90 MMHG | BODY MASS INDEX: 28.65 KG/M2 | RESPIRATION RATE: 16 BRPM | SYSTOLIC BLOOD PRESSURE: 152 MMHG | OXYGEN SATURATION: 95 % | WEIGHT: 217.18 LBS | HEART RATE: 102 BPM

## 2018-10-26 LAB
BASOPHILS # BLD AUTO: 0.2 K/UL — SIGNIFICANT CHANGE UP (ref 0–0.2)
BASOPHILS NFR BLD AUTO: 2.4 % — HIGH (ref 0–2)
EOSINOPHIL # BLD AUTO: 0.1 K/UL — SIGNIFICANT CHANGE UP (ref 0–0.5)
EOSINOPHIL NFR BLD AUTO: 0.9 % — SIGNIFICANT CHANGE UP (ref 0–6)
HCT VFR BLD CALC: 39.9 % — SIGNIFICANT CHANGE UP (ref 39–50)
HGB BLD-MCNC: 14.2 G/DL — SIGNIFICANT CHANGE UP (ref 13–17)
LYMPHOCYTES # BLD AUTO: 1.3 K/UL — SIGNIFICANT CHANGE UP (ref 1–3.3)
LYMPHOCYTES # BLD AUTO: 18.4 % — SIGNIFICANT CHANGE UP (ref 13–44)
MCHC RBC-ENTMCNC: 32.7 PG — SIGNIFICANT CHANGE UP (ref 27–34)
MCHC RBC-ENTMCNC: 35.6 G/DL — SIGNIFICANT CHANGE UP (ref 32–36)
MCV RBC AUTO: 91.7 FL — SIGNIFICANT CHANGE UP (ref 80–100)
MONOCYTES # BLD AUTO: 0.5 K/UL — SIGNIFICANT CHANGE UP (ref 0–0.9)
MONOCYTES NFR BLD AUTO: 7.1 % — SIGNIFICANT CHANGE UP (ref 2–14)
NEUTROPHILS # BLD AUTO: 5.1 K/UL — SIGNIFICANT CHANGE UP (ref 1.8–7.4)
NEUTROPHILS NFR BLD AUTO: 71.1 % — SIGNIFICANT CHANGE UP (ref 43–77)
PLATELET # BLD AUTO: 219 K/UL — SIGNIFICANT CHANGE UP (ref 150–400)
RBC # BLD: 4.35 M/UL — SIGNIFICANT CHANGE UP (ref 4.2–5.8)
RBC # FLD: 11.6 % — SIGNIFICANT CHANGE UP (ref 10.3–14.5)
WBC # BLD: 7.2 K/UL — SIGNIFICANT CHANGE UP (ref 3.8–10.5)
WBC # FLD AUTO: 7.2 K/UL — SIGNIFICANT CHANGE UP (ref 3.8–10.5)

## 2018-10-26 PROCEDURE — 99214 OFFICE O/P EST MOD 30 MIN: CPT

## 2018-10-30 LAB
ALBUMIN SERPL ELPH-MCNC: 4.2 G/DL
ALP BLD-CCNC: 63 U/L
ALT SERPL-CCNC: 28 U/L
AMYLASE/CREAT SERPL: 30 U/L
ANION GAP SERPL CALC-SCNC: 12 MMOL/L
AST SERPL-CCNC: 24 U/L
BILIRUB SERPL-MCNC: 0.3 MG/DL
BUN SERPL-MCNC: 15 MG/DL
CALCIUM SERPL-MCNC: 8.8 MG/DL
CHLORIDE SERPL-SCNC: 103 MMOL/L
CO2 SERPL-SCNC: 27 MMOL/L
CREAT SERPL-MCNC: 0.98 MG/DL
POTASSIUM SERPL-SCNC: 3.4 MMOL/L
PROT SERPL-MCNC: 6.8 G/DL
SODIUM SERPL-SCNC: 142 MMOL/L
T3RU NFR SERPL: 1.11 INDEX
T4 SERPL-MCNC: 7.6 UG/DL
TESTOST SERPL-MCNC: 409.1 NG/DL

## 2018-10-31 LAB
CORTICOSTEROID BIND GLOBULIN: 2.7 MG/DL
CORTIS SERPL-MCNC: 21 UG/DL
CORTISOL, FREE: 4 UG/DL
PFCX: 19 %

## 2018-11-01 ENCOUNTER — APPOINTMENT (OUTPATIENT)
Dept: INFUSION THERAPY | Facility: HOSPITAL | Age: 58
End: 2018-11-01

## 2018-11-07 ENCOUNTER — OUTPATIENT (OUTPATIENT)
Dept: OUTPATIENT SERVICES | Facility: HOSPITAL | Age: 58
LOS: 1 days | Discharge: HOME | End: 2018-11-07

## 2018-11-07 DIAGNOSIS — C43.39 MALIGNANT MELANOMA OF OTHER PARTS OF FACE: ICD-10-CM

## 2018-11-16 ENCOUNTER — RESULT REVIEW (OUTPATIENT)
Age: 58
End: 2018-11-16

## 2018-11-16 ENCOUNTER — APPOINTMENT (OUTPATIENT)
Dept: HEMATOLOGY ONCOLOGY | Facility: CLINIC | Age: 58
End: 2018-11-16
Payer: COMMERCIAL

## 2018-11-16 VITALS
TEMPERATURE: 98.3 F | SYSTOLIC BLOOD PRESSURE: 137 MMHG | OXYGEN SATURATION: 97 % | HEART RATE: 108 BPM | DIASTOLIC BLOOD PRESSURE: 83 MMHG | RESPIRATION RATE: 16 BRPM | BODY MASS INDEX: 28.53 KG/M2 | WEIGHT: 216.27 LBS

## 2018-11-16 DIAGNOSIS — Z82.49 FAMILY HISTORY OF ISCHEMIC HEART DISEASE AND OTHER DISEASES OF THE CIRCULATORY SYSTEM: ICD-10-CM

## 2018-11-16 DIAGNOSIS — Z87.09 PERSONAL HISTORY OF OTHER DISEASES OF THE RESPIRATORY SYSTEM: ICD-10-CM

## 2018-11-16 DIAGNOSIS — Z80.9 FAMILY HISTORY OF MALIGNANT NEOPLASM, UNSPECIFIED: ICD-10-CM

## 2018-11-16 DIAGNOSIS — J18.9 PNEUMONIA, UNSPECIFIED ORGANISM: ICD-10-CM

## 2018-11-16 DIAGNOSIS — Z85.9 PERSONAL HISTORY OF MALIGNANT NEOPLASM, UNSPECIFIED: ICD-10-CM

## 2018-11-16 LAB
ALBUMIN SERPL ELPH-MCNC: 4.2 G/DL
ALP BLD-CCNC: 71 U/L
ALT SERPL-CCNC: 101 U/L
ANION GAP SERPL CALC-SCNC: 12 MMOL/L
AST SERPL-CCNC: 55 U/L
BILIRUB SERPL-MCNC: 0.4 MG/DL
BUN SERPL-MCNC: 15 MG/DL
CALCIUM SERPL-MCNC: 9.1 MG/DL
CHLORIDE SERPL-SCNC: 104 MMOL/L
CO2 SERPL-SCNC: 27 MMOL/L
CREAT SERPL-MCNC: 0.99 MG/DL
GLUCOSE SERPL-MCNC: 127 MG/DL
HCT VFR BLD CALC: 40.6 % — SIGNIFICANT CHANGE UP (ref 39–50)
HGB BLD-MCNC: 14.6 G/DL — SIGNIFICANT CHANGE UP (ref 13–17)
MCHC RBC-ENTMCNC: 32.9 PG — SIGNIFICANT CHANGE UP (ref 27–34)
MCHC RBC-ENTMCNC: 35.9 G/DL — SIGNIFICANT CHANGE UP (ref 32–36)
MCV RBC AUTO: 91.6 FL — SIGNIFICANT CHANGE UP (ref 80–100)
PLATELET # BLD AUTO: 187 K/UL — SIGNIFICANT CHANGE UP (ref 150–400)
POTASSIUM SERPL-SCNC: 3.8 MMOL/L
PROT SERPL-MCNC: 6.7 G/DL
RBC # BLD: 4.43 M/UL — SIGNIFICANT CHANGE UP (ref 4.2–5.8)
RBC # FLD: 12 % — SIGNIFICANT CHANGE UP (ref 10.3–14.5)
SODIUM SERPL-SCNC: 143 MMOL/L
WBC # BLD: 6.7 K/UL — SIGNIFICANT CHANGE UP (ref 3.8–10.5)
WBC # FLD AUTO: 6.7 K/UL — SIGNIFICANT CHANGE UP (ref 3.8–10.5)

## 2018-11-16 PROCEDURE — 99214 OFFICE O/P EST MOD 30 MIN: CPT

## 2018-11-16 NOTE — PHYSICAL EXAM
[Fully active, able to carry on all pre-disease performance without restriction] : Status 0 - Fully active, able to carry on all pre-disease performance without restriction [Normal] : affect appropriate [de-identified] : right sided neck mass measures 3 cm X 3 cm

## 2018-11-16 NOTE — ASSESSMENT
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 I do not find any intra oral  or sublingual lesions. The right jaw swelling is considerably less. I palpate a nodule approximately 2 cm X 2 cm subcutaneously in the area rostral to the edge of the mandible.He appeared well today and his physical examination findings noted a favorable response to treatment; he will proceed with his 2 remaining cycles.Patient is also noted to be c kit positive, imatinib therapy was briefly discussed but he will remain on immunotherapy as directed.  Plan to request imaging studies in the last week of December 2018. Blood studies done today. Return to the office in 3 weeks. Seen and examined in conjunction with Mariah SHELBY

## 2018-11-16 NOTE — HISTORY OF PRESENT ILLNESS
[Disease: _____________________] : Disease: [unfilled] [T: ___] : T[unfilled] [N: ___] : N[unfilled] [M: ___] : M[unfilled] [AJCC Stage: ____] : AJCC Stage: [unfilled] [Treatment Protocol] : Treatment Protocol [Cardiovascular] : Cardiovascular [Constitutional] : Constitutional [ENT] : ENT [Dermatologic] : Dermatologic [Endocrine] : Endocrine [Gastrointestinal] : Gastrointestinal [Genitourinary] : Genitourinary [Gynecologic] : Gynecologic [Infectious] : Infectious [Musculoskeletal] : Musculoskeletal [Neurologic] : Neurologic [Pain] : Pain [Pulmonary] : Pulmonary [Hematologic] : Hematologic [Date: ____________] : Patient's last distress assessment performed on [unfilled]. [1 - Distress Level] : Distress Level: 1 [de-identified] : This 58 year old male describes a mas in the right lower jaw. The patient has a history of dental discomfort and he had a 3rd molar resection (he describes it as a wisdom tooth in the last week of June 2018. Approximately 4 weeks later he noted persistent swelling in the area and he was given a course of antibiotic treatment beneath the right jaw extending to the right buccal area in the last week of July 2018. After one week there was no definite shrinkage, and he went to an urgent care center; a CXR was requested and the right jaw mass was noted; he was referred to [facility] ER  and  CT scan was requested of the chest and ST of the neck and jaw.\par The  CT of the chest showed a  2.5 cm mass of the left lingula .He was referred for follow up by an oral surgeon Dr Jimenez in [facility] who did an incision and drainage and biopsies of the  mandible on August 18 2018. Finding revealed malignant melanoma as Dr Jimenez had sent the slide to  [facility]  for review by Dr Snyder\par He was seen by a primary care physician KRISHAN Peralta and a pulmonologist Tana Scott.\par A CT/PET performed on September 3 2018; the scan was described as showing a FG G avid mass in the right mandible with an S U V of  and an abnormality in the left  lung lingula that measures 2.0 X 3.1 cm and is  F D G avid S U V 15.5 ; there is a second left upper lobe nodule that is small and it was  non FD G avid; he has a right leg intramuscular lesion with low F D G  uptake. Intramuscular lesions and chest wall lesions are thought to be neurofibroma and are not F D G avid. He has a right renal cyst. \par The patient has no history of skin malignant melanoma. He has had a recent assessment  by dermatology of lesions of the right arm and left bridge of nose. No diagnosis of malignancy was made.\par There is a past medical history of resection of neurofibromas; He dose not know of neurofibromas in his past family history including his natural mother and his natural father; he is adopted.  [de-identified] : metastatic melanoma [de-identified] : tumor cells positive for S 100, Melan A ,SOX 10, HMG 45.\par negative for T T F 1, A E 1.3.,p 40\par positive c kit [FreeTextEntry1] : s/p 2 cycles of ipilimumab /nivolumab  [de-identified] : Patient presented to the office for routine follow-up visit. He currently feels fine. He noted a reduction in the size of lymph nodes on the right side in his neck and mouth sores. \par There is no cough sputum production or hemoptysis. he notes a decrease in size of his nodularity in the lower tongue

## 2018-11-19 LAB
T3FREE SERPL-MCNC: 3.57 PG/ML
T4 FREE SERPL-MCNC: 1.2 NG/DL
TSH SERPL-ACNC: 1.62 UIU/ML

## 2018-11-26 LAB
CORTICOSTEROID BIND GLOBULIN: 2.7 MG/DL
CORTIS SERPL-MCNC: 20 UG/DL
CORTISOL, FREE: 3.3 UG/DL
PFCX: 16 %

## 2018-11-29 ENCOUNTER — APPOINTMENT (OUTPATIENT)
Dept: INFUSION THERAPY | Facility: HOSPITAL | Age: 58
End: 2018-11-29

## 2018-11-30 DIAGNOSIS — Z51.11 ENCOUNTER FOR ANTINEOPLASTIC CHEMOTHERAPY: ICD-10-CM

## 2018-11-30 DIAGNOSIS — R11.2 NAUSEA WITH VOMITING, UNSPECIFIED: ICD-10-CM

## 2018-12-07 ENCOUNTER — LABORATORY RESULT (OUTPATIENT)
Age: 58
End: 2018-12-07

## 2018-12-07 ENCOUNTER — RESULT REVIEW (OUTPATIENT)
Age: 58
End: 2018-12-07

## 2018-12-07 ENCOUNTER — APPOINTMENT (OUTPATIENT)
Dept: HEMATOLOGY ONCOLOGY | Facility: CLINIC | Age: 58
End: 2018-12-07
Payer: COMMERCIAL

## 2018-12-07 VITALS
RESPIRATION RATE: 16 BRPM | OXYGEN SATURATION: 97 % | TEMPERATURE: 97.8 F | BODY MASS INDEX: 28.85 KG/M2 | DIASTOLIC BLOOD PRESSURE: 90 MMHG | HEART RATE: 95 BPM | WEIGHT: 218.68 LBS | SYSTOLIC BLOOD PRESSURE: 160 MMHG

## 2018-12-07 LAB
BASOPHILS # BLD AUTO: 0 K/UL — SIGNIFICANT CHANGE UP (ref 0–0.2)
BASOPHILS NFR BLD AUTO: 0.5 % — SIGNIFICANT CHANGE UP (ref 0–2)
EOSINOPHIL # BLD AUTO: 0.1 K/UL — SIGNIFICANT CHANGE UP (ref 0–0.5)
EOSINOPHIL NFR BLD AUTO: 0.7 % — SIGNIFICANT CHANGE UP (ref 0–6)
HCT VFR BLD CALC: 43.5 % — SIGNIFICANT CHANGE UP (ref 39–50)
HGB BLD-MCNC: 14.8 G/DL — SIGNIFICANT CHANGE UP (ref 13–17)
LYMPHOCYTES # BLD AUTO: 2 K/UL — SIGNIFICANT CHANGE UP (ref 1–3.3)
LYMPHOCYTES # BLD AUTO: 21.1 % — SIGNIFICANT CHANGE UP (ref 13–44)
MCHC RBC-ENTMCNC: 31.1 PG — SIGNIFICANT CHANGE UP (ref 27–34)
MCHC RBC-ENTMCNC: 34.1 G/DL — SIGNIFICANT CHANGE UP (ref 32–36)
MCV RBC AUTO: 91.1 FL — SIGNIFICANT CHANGE UP (ref 80–100)
MONOCYTES # BLD AUTO: 0.7 K/UL — SIGNIFICANT CHANGE UP (ref 0–0.9)
MONOCYTES NFR BLD AUTO: 7.5 % — SIGNIFICANT CHANGE UP (ref 2–14)
NEUTROPHILS # BLD AUTO: 6.8 K/UL — SIGNIFICANT CHANGE UP (ref 1.8–7.4)
NEUTROPHILS NFR BLD AUTO: 70.2 % — SIGNIFICANT CHANGE UP (ref 43–77)
PLATELET # BLD AUTO: 188 K/UL — SIGNIFICANT CHANGE UP (ref 150–400)
RBC # BLD: 4.77 M/UL — SIGNIFICANT CHANGE UP (ref 4.2–5.8)
RBC # FLD: 11.9 % — SIGNIFICANT CHANGE UP (ref 10.3–14.5)
WBC # BLD: 9.6 K/UL — SIGNIFICANT CHANGE UP (ref 3.8–10.5)
WBC # FLD AUTO: 9.6 K/UL — SIGNIFICANT CHANGE UP (ref 3.8–10.5)

## 2018-12-07 PROCEDURE — 99215 OFFICE O/P EST HI 40 MIN: CPT

## 2018-12-07 RX ORDER — MOMETASONE FUROATE 1 MG/G
0.1 OINTMENT TOPICAL TWICE DAILY
Qty: 1 | Refills: 0 | Status: DISCONTINUED | COMMUNITY
Start: 2018-09-17 | End: 2018-12-07

## 2018-12-07 RX ORDER — RANITIDINE HYDROCHLORIDE 300 MG/1
300 CAPSULE ORAL
Qty: 30 | Refills: 3 | Status: COMPLETED | COMMUNITY
Start: 2018-08-29 | End: 2018-12-07

## 2018-12-07 RX ORDER — OMEPRAZOLE 40 MG/1
40 CAPSULE, DELAYED RELEASE ORAL
Qty: 30 | Refills: 3 | Status: COMPLETED | COMMUNITY
Start: 2018-08-29 | End: 2018-12-07

## 2018-12-07 NOTE — HISTORY OF PRESENT ILLNESS
[Disease: _____________________] : Disease: [unfilled] [T: ___] : T[unfilled] [N: ___] : N[unfilled] [M: ___] : M[unfilled] [AJCC Stage: ____] : AJCC Stage: [unfilled] [Treatment Protocol] : Treatment Protocol [Cardiovascular] : Cardiovascular [Constitutional] : Constitutional [ENT] : ENT [Dermatologic] : Dermatologic [Endocrine] : Endocrine [Gastrointestinal] : Gastrointestinal [Genitourinary] : Genitourinary [Gynecologic] : Gynecologic [Infectious] : Infectious [Musculoskeletal] : Musculoskeletal [Neurologic] : Neurologic [Pain] : Pain [Pulmonary] : Pulmonary [Hematologic] : Hematologic [Date: ____________] : Patient's last distress assessment performed on [unfilled]. [3 - Distress Level] : Distress Level: 3 [de-identified] : This 58 year old male describes a mas in the right lower jaw. The patient has a history of dental discomfort and he had a 3rd molar resection (he describes it as a wisdom tooth in the last week of June 2018. Approximately 4 weeks later he noted persistent swelling in the area and he was given a course of antibiotic treatment beneath the right jaw extending to the right buccal area in the last week of July 2018. After one week there was no definite shrinkage, and he went to an urgent care center; a CXR was requested and the right jaw mass was noted; he was referred to [facility] ER  and  CT scan was requested of the chest and ST of the neck and jaw.\par The  CT of the chest showed a  2.5 cm mass of the left lingula .He was referred for follow up by an oral surgeon Dr Jimenez in [facility] who did an incision and drainage and biopsies of the  mandible on August 18 2018. Finding revealed malignant melanoma as Dr Jimenez had sent the slide to  [facility]  for review by Dr Snyder\par He was seen by a primary care physician KRISHAN Peralta and a pulmonologist Tana Scott.\par A CT/PET performed on September 3 2018; the scan was described as showing a FG G avid mass in the right mandible with an S U V of  and an abnormality in the left  lung lingula that measures 2.0 X 3.1 cm and is  F D G avid S U V 15.5 ; there is a second left upper lobe nodule that is small and it was  non FD G avid; he has a right leg intramuscular lesion with low F D G  uptake. Intramuscular lesions and chest wall lesions are thought to be neurofibroma and are not F D G avid. He has a right renal cyst. \par The patient has no history of skin malignant melanoma. He has had a recent assessment  by dermatology of lesions of the right arm and left bridge of nose. No diagnosis of malignancy was made.\par There is a past medical history of resection of neurofibromas; He dose not know of neurofibromas in his past family history including his natural mother and his natural father; he is adopted.  [de-identified] : metastatic melanoma [de-identified] : tumor cells positive for S 100, Melan A ,SOX 10, HMG 45.\par negative for T T F 1, A E 1.3.,p 40\par positive c kit [FreeTextEntry1] : ipilimumab /nivolumab [de-identified] : Wali feels well. No diarrhea, no cough and no obvious immune side effects.\par Appetite is good no vomiting and no head ache or bleeding from jaw\par Abnormality in SGPT and SGOT noted on November 17 no side effects and normal serum bilirubin.

## 2018-12-07 NOTE — ASSESSMENT
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 He has had no significant side effects form treatment; he has been noted to have a mild elevation of SGOT, SGPT. We will repeat serum studies today and we will request blood studies in preparation of December 13 2018 cycle 4 of ipilumab and nivolumab.\par Patient will be seen in follow up second week of January 2018 after his scheduled scans\par

## 2018-12-07 NOTE — REVIEW OF SYSTEMS
[Negative] : Allergic/Immunologic [Anxiety] : anxiety [Fever] : no fever [Chills] : no chills [Fatigue] : no fatigue [Recent Change In Weight] : ~T no recent weight change [Eye Pain] : no eye pain [Red Eyes] : eyes not red [Dry Eyes] : no dryness of the eyes [Vision Problems] : no vision problems [Dysphagia] : no dysphagia [Loss of Hearing] : no loss of hearing [Nosebleeds] : no nosebleeds [Hoarseness] : no hoarseness [Odynophagia] : no odynophagia [Mucosal Pain] : no mucosal pain [Joint Pain] : no joint pain [Joint Stiffness] : no joint stiffness [Muscle Pain] : no muscle pain [Muscle Weakness] : no muscle weakness [Skin Rash] : no skin rash [Skin Wound] : no skin wound [Confused] : no confusion [Dizziness] : no dizziness [Fainting] : no fainting [Difficulty Walking] : no difficulty walking [Suicidal] : not suicidal [Insomnia] : no insomnia [Depression] : no depression [de-identified] : less anxiety

## 2018-12-10 ENCOUNTER — OUTPATIENT (OUTPATIENT)
Dept: OUTPATIENT SERVICES | Facility: HOSPITAL | Age: 58
LOS: 1 days | Discharge: HOME | End: 2018-12-10

## 2018-12-10 ENCOUNTER — APPOINTMENT (OUTPATIENT)
Dept: DERMATOLOGY | Facility: CLINIC | Age: 58
End: 2018-12-10
Payer: COMMERCIAL

## 2018-12-10 DIAGNOSIS — C43.39 MALIGNANT MELANOMA OF OTHER PARTS OF FACE: ICD-10-CM

## 2018-12-10 LAB
ALBUMIN SERPL ELPH-MCNC: 4.4 G/DL
ALP BLD-CCNC: 74 U/L
ALT SERPL-CCNC: 79 U/L
ANION GAP SERPL CALC-SCNC: 12 MMOL/L
AST SERPL-CCNC: 49 U/L
BILIRUB SERPL-MCNC: 0.5 MG/DL
BUN SERPL-MCNC: 16 MG/DL
CALCIUM SERPL-MCNC: 9.3 MG/DL
CHLORIDE SERPL-SCNC: 103 MMOL/L
CO2 SERPL-SCNC: 26 MMOL/L
CORTIS SERPL-MCNC: 13.4 UG/DL
CREAT SERPL-MCNC: 1 MG/DL
GLUCOSE SERPL-MCNC: 97 MG/DL
LDH SERPL-CCNC: 257 U/L
POTASSIUM SERPL-SCNC: 4.2 MMOL/L
PROT SERPL-MCNC: 7 G/DL
SODIUM SERPL-SCNC: 141 MMOL/L
T3RU NFR SERPL: 1.15 INDEX
T4 SERPL-MCNC: 8.1 UG/DL

## 2018-12-10 PROCEDURE — 99214 OFFICE O/P EST MOD 30 MIN: CPT | Mod: 25

## 2018-12-10 PROCEDURE — 17000 DESTRUCT PREMALG LESION: CPT

## 2018-12-13 ENCOUNTER — APPOINTMENT (OUTPATIENT)
Dept: INFUSION THERAPY | Facility: HOSPITAL | Age: 58
End: 2018-12-13

## 2018-12-14 DIAGNOSIS — R11.2 NAUSEA WITH VOMITING, UNSPECIFIED: ICD-10-CM

## 2018-12-14 DIAGNOSIS — Z51.11 ENCOUNTER FOR ANTINEOPLASTIC CHEMOTHERAPY: ICD-10-CM

## 2019-01-03 ENCOUNTER — FORM ENCOUNTER (OUTPATIENT)
Age: 59
End: 2019-01-03

## 2019-01-04 ENCOUNTER — APPOINTMENT (OUTPATIENT)
Dept: MRI IMAGING | Facility: IMAGING CENTER | Age: 59
End: 2019-01-04
Payer: COMMERCIAL

## 2019-01-04 ENCOUNTER — APPOINTMENT (OUTPATIENT)
Dept: CT IMAGING | Facility: IMAGING CENTER | Age: 59
End: 2019-01-04
Payer: COMMERCIAL

## 2019-01-04 ENCOUNTER — OUTPATIENT (OUTPATIENT)
Dept: OUTPATIENT SERVICES | Facility: HOSPITAL | Age: 59
LOS: 1 days | End: 2019-01-04
Payer: COMMERCIAL

## 2019-01-04 DIAGNOSIS — C43.30 MALIGNANT MELANOMA OF UNSPECIFIED PART OF FACE: ICD-10-CM

## 2019-01-04 DIAGNOSIS — C79.89 SECONDARY MALIGNANT NEOPLASM OF OTHER SPECIFIED SITES: ICD-10-CM

## 2019-01-04 PROCEDURE — 70491 CT SOFT TISSUE NECK W/DYE: CPT | Mod: 26

## 2019-01-04 PROCEDURE — 74177 CT ABD & PELVIS W/CONTRAST: CPT

## 2019-01-04 PROCEDURE — 70491 CT SOFT TISSUE NECK W/DYE: CPT

## 2019-01-04 PROCEDURE — 71260 CT THORAX DX C+: CPT | Mod: 26

## 2019-01-04 PROCEDURE — 82565 ASSAY OF CREATININE: CPT

## 2019-01-04 PROCEDURE — 70487 CT MAXILLOFACIAL W/DYE: CPT

## 2019-01-04 PROCEDURE — 70487 CT MAXILLOFACIAL W/DYE: CPT | Mod: 26

## 2019-01-04 PROCEDURE — 74177 CT ABD & PELVIS W/CONTRAST: CPT | Mod: 26

## 2019-01-04 PROCEDURE — 71260 CT THORAX DX C+: CPT

## 2019-01-07 ENCOUNTER — OUTPATIENT (OUTPATIENT)
Dept: OUTPATIENT SERVICES | Facility: HOSPITAL | Age: 59
LOS: 1 days | Discharge: HOME | End: 2019-01-07

## 2019-01-07 DIAGNOSIS — C43.39 MALIGNANT MELANOMA OF OTHER PARTS OF FACE: ICD-10-CM

## 2019-01-10 ENCOUNTER — APPOINTMENT (OUTPATIENT)
Dept: HEMATOLOGY ONCOLOGY | Facility: CLINIC | Age: 59
End: 2019-01-10
Payer: COMMERCIAL

## 2019-01-10 ENCOUNTER — RESULT REVIEW (OUTPATIENT)
Age: 59
End: 2019-01-10

## 2019-01-10 VITALS
SYSTOLIC BLOOD PRESSURE: 176 MMHG | BODY MASS INDEX: 28.79 KG/M2 | HEART RATE: 105 BPM | RESPIRATION RATE: 17 BRPM | WEIGHT: 218.25 LBS | TEMPERATURE: 99 F | DIASTOLIC BLOOD PRESSURE: 102 MMHG | OXYGEN SATURATION: 97 %

## 2019-01-10 LAB
HCT VFR BLD CALC: 41.8 % — SIGNIFICANT CHANGE UP (ref 39–50)
HGB BLD-MCNC: 14.9 G/DL — SIGNIFICANT CHANGE UP (ref 13–17)
MCHC RBC-ENTMCNC: 32.4 PG — SIGNIFICANT CHANGE UP (ref 27–34)
MCHC RBC-ENTMCNC: 35.6 G/DL — SIGNIFICANT CHANGE UP (ref 32–36)
MCV RBC AUTO: 91.1 FL — SIGNIFICANT CHANGE UP (ref 80–100)
PLATELET # BLD AUTO: 234 K/UL — SIGNIFICANT CHANGE UP (ref 150–400)
RBC # BLD: 4.59 M/UL — SIGNIFICANT CHANGE UP (ref 4.2–5.8)
RBC # FLD: 11.7 % — SIGNIFICANT CHANGE UP (ref 10.3–14.5)
WBC # BLD: 8.7 K/UL — SIGNIFICANT CHANGE UP (ref 3.8–10.5)
WBC # FLD AUTO: 8.7 K/UL — SIGNIFICANT CHANGE UP (ref 3.8–10.5)

## 2019-01-10 PROCEDURE — 99214 OFFICE O/P EST MOD 30 MIN: CPT

## 2019-01-10 NOTE — CONSULT LETTER
[Dear  ___] : Dear  [unfilled], [Referral Letter:] : I am referring [unfilled] to you for further evaluation.  My most recent evaluation follows. [Referral Closing:] : Thank you very much for seeing this patient.  If you have any questions, please do not hesitate to contact me. [Sincerely,] : Sincerely, [Please see my note below.] : Please see my note below. [FreeTextEntry2] : Waqar Don MD\par [address]\par [address] \par [facility]\par [facility] [id number] [FreeTextEntry3] : Mustapha Barnes

## 2019-01-10 NOTE — REVIEW OF SYSTEMS
[Anxiety] : anxiety [Negative] : Allergic/Immunologic [Fever] : no fever [Chills] : no chills [Fatigue] : no fatigue [Recent Change In Weight] : ~T no recent weight change [Eye Pain] : no eye pain [Red Eyes] : eyes not red [Dry Eyes] : no dryness of the eyes [Vision Problems] : no vision problems [Dysphagia] : no dysphagia [Loss of Hearing] : no loss of hearing [Nosebleeds] : no nosebleeds [Hoarseness] : no hoarseness [Odynophagia] : no odynophagia [Mucosal Pain] : no mucosal pain [Joint Pain] : no joint pain [Joint Stiffness] : no joint stiffness [Muscle Pain] : no muscle pain [Muscle Weakness] : no muscle weakness [Skin Rash] : no skin rash [Skin Wound] : no skin wound [Confused] : no confusion [Dizziness] : no dizziness [Fainting] : no fainting [Difficulty Walking] : no difficulty walking [Suicidal] : not suicidal [Insomnia] : no insomnia [Depression] : no depression [de-identified] : less anxiety

## 2019-01-10 NOTE — PHYSICAL EXAM
LOC: The patient is awake, alert and aware of environment with an anxious affect, the patient is oriented x 3 and speaking appropriately. Pt is hearing impaired.   APPEARANCE: pt is elderly. Pt is uncomfortable.   SKIN: The skin is warm and very dry, patient has normal skin turgor and moist mucus membranes, skin intact, no breakdown or brusing noted. Scar noted to abdomen from AAA repair.   MUSKULOSKELETAL: Patient moving all extremities, but is weak, no obvious swelling or deformities noted.   RESPIRATORY: Airway is open and patent, respirations are spontaneous, patient has a normal effort and rate. Breath sounds are coarse bilaterally throughout lungs.   CARDIAC: Normal heart sounds. No peripheral edema. Sinus tachycardia noted on the monitor. Radial and dorsalis pedis pulses are strong bilaterally.   ABDOMEN: Soft and non tender to palpation, distention noted; umbilical hernia noted. Bowel sounds present. Pt denies pain.   NEURO: No neuro deficits, hand grasp equal, no drift noted, no facial droop noted. Speech is clear.     [Fully active, able to carry on all pre-disease performance without restriction] : Status 0 - Fully active, able to carry on all pre-disease performance without restriction [Normal] : affect appropriate

## 2019-01-10 NOTE — RESULTS/DATA
[FreeTextEntry1] : review of Ct scan and copy of the results given to the patient.\par WBC 8,700 HGB 14.9  000

## 2019-01-10 NOTE — END OF VISIT
[>50% of Time Spent on Counseling and Coordination of Care for  ___] : Greater than 50% of the encounter time was spent on counseling and coordination of care for [unfilled] [Time Spent: ___ minutes] : I have spent [unfilled] minutes of face to face time with the patient Yes

## 2019-01-10 NOTE — ASSESSMENT
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 He has had an excellent response to therapy.\par We will plan to change to monthly nivolumab in the planned 480 mg dose; treatment will be one month after his last treatment; schedule for  January 17 2019.\par I will ask him to visit Dr Don for follow up; the response to therapy in the jaw has been excellent.\par Patient see with YOGESH ALAN

## 2019-01-11 LAB
ALBUMIN SERPL ELPH-MCNC: 3.9 G/DL
ALP BLD-CCNC: 68 U/L
ALT SERPL-CCNC: 24 U/L
ANION GAP SERPL CALC-SCNC: 13 MMOL/L
AST SERPL-CCNC: 21 U/L
BILIRUB SERPL-MCNC: 0.4 MG/DL
BUN SERPL-MCNC: 15 MG/DL
CALCIUM SERPL-MCNC: 9.1 MG/DL
CHLORIDE SERPL-SCNC: 104 MMOL/L
CO2 SERPL-SCNC: 25 MMOL/L
CREAT SERPL-MCNC: 0.92 MG/DL
GLUCOSE SERPL-MCNC: 85 MG/DL
POTASSIUM SERPL-SCNC: 4.4 MMOL/L
PROT SERPL-MCNC: 6.9 G/DL
SODIUM SERPL-SCNC: 142 MMOL/L
T3FREE SERPL-MCNC: 3.85 PG/ML
T4 FREE SERPL-MCNC: 1.4 NG/DL
TSH SERPL-ACNC: 0.21 UIU/ML

## 2019-01-16 LAB
CORTICOSTEROID BIND GLOBULIN: 2.3 MG/DL
CORTIS SERPL-MCNC: 17 UG/DL
CORTISOL, FREE: 2.9 UG/DL
PFCX: 17 %

## 2019-01-17 ENCOUNTER — APPOINTMENT (OUTPATIENT)
Dept: INFUSION THERAPY | Facility: HOSPITAL | Age: 59
End: 2019-01-17

## 2019-01-18 DIAGNOSIS — Z51.11 ENCOUNTER FOR ANTINEOPLASTIC CHEMOTHERAPY: ICD-10-CM

## 2019-01-28 ENCOUNTER — APPOINTMENT (OUTPATIENT)
Dept: OTOLARYNGOLOGY | Facility: CLINIC | Age: 59
End: 2019-01-28
Payer: COMMERCIAL

## 2019-01-28 VITALS
OXYGEN SATURATION: 98 % | WEIGHT: 215 LBS | DIASTOLIC BLOOD PRESSURE: 116 MMHG | BODY MASS INDEX: 28.49 KG/M2 | HEART RATE: 127 BPM | HEIGHT: 73 IN | SYSTOLIC BLOOD PRESSURE: 179 MMHG

## 2019-01-28 PROCEDURE — 99213 OFFICE O/P EST LOW 20 MIN: CPT

## 2019-01-30 ENCOUNTER — OUTPATIENT (OUTPATIENT)
Dept: OUTPATIENT SERVICES | Facility: HOSPITAL | Age: 59
LOS: 1 days | Discharge: HOME | End: 2019-01-30

## 2019-01-30 DIAGNOSIS — C43.39 MALIGNANT MELANOMA OF OTHER PARTS OF FACE: ICD-10-CM

## 2019-01-30 NOTE — PHYSICAL EXAM
[Normal] : external appearance is normal [de-identified] : mobile soft adnenopathy of b/l level I but without fixation to surrounding structures or skin, approximately 1cm in size; no neck masses [FreeTextEntry1] : the right mandible has minimal buccal expansion, there is no ulceration of the gingiva or exposure of mandibular bone; no grossly mobile teeth nor evidence of gross melanoma disease

## 2019-01-30 NOTE — HISTORY OF PRESENT ILLNESS
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  Pt had no severe side effects from the medications, is doing well, eating ok and maintaining weight.  HE is here today for checkup after interval imaging showed significant reduction in site of primary which was compoleted early January 2019 after the initial four cycles of treatment.  No F/C\par , no dysphagia, no significant pain

## 2019-01-30 NOTE — REVIEW OF SYSTEMS
[As Noted in HPI] : as noted in HPI [Negative] : Psychiatric [Swelling Neck] : no neck swelling [Swelling Face] : no face swelling [Fever] : no fever [Chills] : no chills [Swollen Glands] : no swollen glands [Swollen Glands In The Neck] : no swollen glands in the neck

## 2019-01-30 NOTE — ASSESSMENT
[FreeTextEntry1] : 59 yo M s/p PDL-1 and CTLA-4 therapy for four cycles now with consolidation therapy with strong clinical and radiologic response to therapy at the primary site and possible pseudoprogression of level I/II cervical lymph nodes as visualized on CT though with benign clinical appearance. \par -discussed patients care with Dr Barnes, will plan to continue systemic therapy and the patient will return to see Dr Don at the prompt of Dr Barnes\par

## 2019-01-30 NOTE — DATA REVIEWED
[de-identified] : CT shows decrease size of right mandibular lesion, calcification of the periosteal reaction, interval increase in size of b/l level I/II adenopathy, significant decrease in size of lung lesion

## 2019-02-07 ENCOUNTER — RESULT REVIEW (OUTPATIENT)
Age: 59
End: 2019-02-07

## 2019-02-07 ENCOUNTER — LABORATORY RESULT (OUTPATIENT)
Age: 59
End: 2019-02-07

## 2019-02-07 ENCOUNTER — APPOINTMENT (OUTPATIENT)
Dept: HEMATOLOGY ONCOLOGY | Facility: CLINIC | Age: 59
End: 2019-02-07
Payer: COMMERCIAL

## 2019-02-07 VITALS
SYSTOLIC BLOOD PRESSURE: 160 MMHG | BODY MASS INDEX: 29.03 KG/M2 | WEIGHT: 220 LBS | OXYGEN SATURATION: 94 % | HEART RATE: 114 BPM | RESPIRATION RATE: 17 BRPM | DIASTOLIC BLOOD PRESSURE: 100 MMHG | TEMPERATURE: 98.4 F

## 2019-02-07 LAB
HCT VFR BLD CALC: 41.6 % — SIGNIFICANT CHANGE UP (ref 39–50)
HGB BLD-MCNC: 14.7 G/DL — SIGNIFICANT CHANGE UP (ref 13–17)
MCHC RBC-ENTMCNC: 32.2 PG — SIGNIFICANT CHANGE UP (ref 27–34)
MCHC RBC-ENTMCNC: 35.5 G/DL — SIGNIFICANT CHANGE UP (ref 32–36)
MCV RBC AUTO: 90.7 FL — SIGNIFICANT CHANGE UP (ref 80–100)
PLATELET # BLD AUTO: 203 K/UL — SIGNIFICANT CHANGE UP (ref 150–400)
RBC # BLD: 4.58 M/UL — SIGNIFICANT CHANGE UP (ref 4.2–5.8)
RBC # FLD: 11.7 % — SIGNIFICANT CHANGE UP (ref 10.3–14.5)
WBC # BLD: 6.1 K/UL — SIGNIFICANT CHANGE UP (ref 3.8–10.5)
WBC # FLD AUTO: 6.1 K/UL — SIGNIFICANT CHANGE UP (ref 3.8–10.5)

## 2019-02-07 PROCEDURE — 99215 OFFICE O/P EST HI 40 MIN: CPT

## 2019-02-07 NOTE — HISTORY OF PRESENT ILLNESS
[Disease: _____________________] : Disease: [unfilled] [T: ___] : T[unfilled] [N: ___] : N[unfilled] [M: ___] : M[unfilled] [AJCC Stage: ____] : AJCC Stage: [unfilled] [Treatment Protocol] : Treatment Protocol [Cardiovascular] : Cardiovascular [Constitutional] : Constitutional [ENT] : ENT [Dermatologic] : Dermatologic [Endocrine] : Endocrine [Gastrointestinal] : Gastrointestinal [Genitourinary] : Genitourinary [Gynecologic] : Gynecologic [Infectious] : Infectious [Musculoskeletal] : Musculoskeletal [Neurologic] : Neurologic [Pain] : Pain [Pulmonary] : Pulmonary [Hematologic] : Hematologic [Date: ____________] : Patient's last distress assessment performed on [unfilled]. [0 - No Distress] : Distress Level: 0 [de-identified] : This 58 year old male describes a mas in the right lower jaw. The patient has a history of dental discomfort and he had a 3rd molar resection (he describes it as a wisdom tooth in the last week of June 2018. Approximately 4 weeks later he noted persistent swelling in the area and he was given a course of antibiotic treatment beneath the right jaw extending to the right buccal area in the last week of July 2018. After one week there was no definite shrinkage, and he went to an urgent care center; a CXR was requested and the right jaw mass was noted; he was referred to [facility] ER  and  CT scan was requested of the chest and ST of the neck and jaw.\par The  CT of the chest showed a  2.5 cm mass of the left lingula .He was referred for follow up by an oral surgeon Dr Jimenez in [facility] who did an incision and drainage and biopsies of the  mandible on August 18 2018. Finding revealed malignant melanoma as Dr Jimenez had sent the slide to  ERIKA GARCIA  for review by Dr Snyder\par He was seen by a primary care physician KRISHAN Peralta and a pulmonologist Tana Scott.\par A CT/PET performed on September 3 2018; the scan was described as showing a FG G avid mass in the right mandible with an S U V of  and an abnormality in the left  lung lingula that measures 2.0 X 3.1 cm and is  F D G avid S U V 15.5 ; there is a second left upper lobe nodule that is small and it was  non FD G avid; he has a right leg intramuscular lesion with low F D G  uptake. Intramuscular lesions and chest wall lesions are thought to be neurofibroma and are not F D G avid. He has a right renal cyst. \par The patient has no history of skin malignant melanoma. He has had a recent assessment  by dermatology of lesions of the right arm and left bridge of nose. No diagnosis of malignancy was made.\par There is a past medical history of resection of neurofibromas; He dose not know of neurofibromas in his past family history including his natural mother and his natural father; he is adopted.  [de-identified] : metastatic melanoma [de-identified] : tumor cells positive for S 100, Melan A ,SOX 10, HMG 45.\par negative for T T F 1, A E 1.3.,p 40\par positive c kit [FreeTextEntry1] : nivolumab 480 mg IV monthly (fourth month of therapy; s/p one month single agent nivolumab)  [de-identified] : He has been feeling well. Stable diet. No new problems. He has concern over his hypertension.\par Family life has been good ( parents in [facility]).\par no diarrhea; He was evaluated by Dr Don of otolaryngology

## 2019-02-07 NOTE — HISTORY OF PRESENT ILLNESS
[Disease: _____________________] : Disease: [unfilled] [T: ___] : T[unfilled] [N: ___] : N[unfilled] [M: ___] : M[unfilled] [AJCC Stage: ____] : AJCC Stage: [unfilled] [Treatment Protocol] : Treatment Protocol [Cardiovascular] : Cardiovascular [Constitutional] : Constitutional [ENT] : ENT [Dermatologic] : Dermatologic [Endocrine] : Endocrine [Gastrointestinal] : Gastrointestinal [Genitourinary] : Genitourinary [Gynecologic] : Gynecologic [Infectious] : Infectious [Musculoskeletal] : Musculoskeletal [Neurologic] : Neurologic [Pain] : Pain [Pulmonary] : Pulmonary [Hematologic] : Hematologic [Date: ____________] : Patient's last distress assessment performed on [unfilled]. [0 - No Distress] : Distress Level: 0 [de-identified] : This 58 year old male describes a mas in the right lower jaw. The patient has a history of dental discomfort and he had a 3rd molar resection (he describes it as a wisdom tooth in the last week of June 2018. Approximately 4 weeks later he noted persistent swelling in the area and he was given a course of antibiotic treatment beneath the right jaw extending to the right buccal area in the last week of July 2018. After one week there was no definite shrinkage, and he went to an urgent care center; a CXR was requested and the right jaw mass was noted; he was referred to [facility] ER  and  CT scan was requested of the chest and ST of the neck and jaw.\par The  CT of the chest showed a  2.5 cm mass of the left lingula .He was referred for follow up by an oral surgeon Dr Jimenez in [facility] who did an incision and drainage and biopsies of the  mandible on August 18 2018. Finding revealed malignant melanoma as Dr Jimenez had sent the slide to  ERIKA GARCIA  for review by Dr Snyder\par He was seen by a primary care physician KRISHAN Peralta and a pulmonologist Tana Scott.\par A CT/PET performed on September 3 2018; the scan was described as showing a FG G avid mass in the right mandible with an S U V of  and an abnormality in the left  lung lingula that measures 2.0 X 3.1 cm and is  F D G avid S U V 15.5 ; there is a second left upper lobe nodule that is small and it was  non FD G avid; he has a right leg intramuscular lesion with low F D G  uptake. Intramuscular lesions and chest wall lesions are thought to be neurofibroma and are not F D G avid. He has a right renal cyst. \par The patient has no history of skin malignant melanoma. He has had a recent assessment  by dermatology of lesions of the right arm and left bridge of nose. No diagnosis of malignancy was made.\par There is a past medical history of resection of neurofibromas; He dose not know of neurofibromas in his past family history including his natural mother and his natural father; he is adopted.  [de-identified] : metastatic melanoma [de-identified] : tumor cells positive for S 100, Melan A ,SOX 10, HMG 45.\par negative for T T F 1, A E 1.3.,p 40\par positive c kit [FreeTextEntry1] : nivolumab 480 mg IV monthly (fourth month of therapy; s/p one month single agent nivolumab)  [de-identified] : He has been feeling well. Stable diet. No new problems. He has concern over his hypertension.\par Family life has been good ( parents in [facility]).\par no diarrhea; He was evaluated by Dr Don of otolaryngology

## 2019-02-07 NOTE — REVIEW OF SYSTEMS
[Fever] : no fever [Chills] : no chills [Fatigue] : no fatigue [Recent Change In Weight] : ~T no recent weight change [Eye Pain] : no eye pain [Red Eyes] : eyes not red [Dry Eyes] : no dryness of the eyes [Vision Problems] : no vision problems [Dysphagia] : no dysphagia [Loss of Hearing] : no loss of hearing [Nosebleeds] : no nosebleeds [Hoarseness] : no hoarseness [Odynophagia] : no odynophagia [Mucosal Pain] : no mucosal pain [Joint Pain] : no joint pain [Joint Stiffness] : no joint stiffness [Muscle Pain] : no muscle pain [Muscle Weakness] : no muscle weakness [Skin Rash] : no skin rash [Skin Wound] : no skin wound [Confused] : no confusion [Dizziness] : no dizziness [Fainting] : no fainting [Difficulty Walking] : no difficulty walking [Suicidal] : not suicidal [Insomnia] : no insomnia [Anxiety] : anxiety [Depression] : no depression [de-identified] : less anxiety [Negative] : Allergic/Immunologic

## 2019-02-07 NOTE — CONSULT LETTER
[Dear  ___] : Dear  [unfilled], [Courtesy Letter:] : I had the pleasure of seeing your patient, [unfilled], in my office today. [Please see my note below.] : Please see my note below. [Referral Closing:] : Thank you very much for seeing this patient.  If you have any questions, please do not hesitate to contact me. [Sincerely,] : Sincerely, [DrPrince  ___] : Dr. BOYCE [FreeTextEntry2] : Waqar Don MD\par [facility]\par [address]\par [facility]\par [facility] [id number] [FreeTextEntry3] : Mutsapha Barnes

## 2019-02-07 NOTE — PHYSICAL EXAM
[Fully active, able to carry on all pre-disease performance without restriction] : Status 0 - Fully active, able to carry on all pre-disease performance without restriction [Normal] : affect appropriate [de-identified] : stable left thorax subcutaneous mass 5 cm X 4 cm (assumed to be neurofibroma)

## 2019-02-07 NOTE — ASSESSMENT
[Supportive] : Goals of care discussed with patient: Supportive [Palliative Care Plan] : not applicable at this time [FreeTextEntry1] : Wali Montoya is a 58 year old male with c kit positive malignant melanoma arising in the left gingiva and known to have a right pulmonary lesion with a biopsy that shows metastasis. He has had complete shrinkage in the area of the primary tumor and a greater than 50 % reduction of the size of the pulmonary lesion. He has no symptoms of tumor and he has not reported side effects consistent with toxicity for prior CTLA 4 inhibition or current PD 1 inhibition therapy.\par He is in his 4th month of treatment and he will have blood testing to measure CMP and endocrine levels to monitor potential toxicity.Plan for treatment monthly dose nivolumab 480 mg on 02/14/2019' HE will be seen in follow up in one month; Plan to repeat scans in first or second week of April 2019

## 2019-02-07 NOTE — CONSULT LETTER
[Dear  ___] : Dear  [unfilled], [Courtesy Letter:] : I had the pleasure of seeing your patient, [unfilled], in my office today. [Please see my note below.] : Please see my note below. [Referral Closing:] : Thank you very much for seeing this patient.  If you have any questions, please do not hesitate to contact me. [Sincerely,] : Sincerely, [DrPrince  ___] : Dr. BOYCE [FreeTextEntry2] : Waqar Don MD\par [facility]\par [address]\par [facility]\par [facility] [address] [FreeTextEntry3] : Mustapha Barnes

## 2019-02-07 NOTE — REVIEW OF SYSTEMS
[Fever] : no fever [Chills] : no chills [Fatigue] : no fatigue [Recent Change In Weight] : ~T no recent weight change [Eye Pain] : no eye pain [Red Eyes] : eyes not red [Dry Eyes] : no dryness of the eyes [Vision Problems] : no vision problems [Dysphagia] : no dysphagia [Loss of Hearing] : no loss of hearing [Nosebleeds] : no nosebleeds [Hoarseness] : no hoarseness [Odynophagia] : no odynophagia [Mucosal Pain] : no mucosal pain [Joint Pain] : no joint pain [Joint Stiffness] : no joint stiffness [Muscle Pain] : no muscle pain [Muscle Weakness] : no muscle weakness [Skin Rash] : no skin rash [Skin Wound] : no skin wound [Confused] : no confusion [Dizziness] : no dizziness [Fainting] : no fainting [Difficulty Walking] : no difficulty walking [Suicidal] : not suicidal [Insomnia] : no insomnia [Anxiety] : anxiety [Depression] : no depression [de-identified] : less anxiety [Negative] : Allergic/Immunologic

## 2019-02-08 LAB
ALBUMIN SERPL ELPH-MCNC: 3.9 G/DL
ALP BLD-CCNC: 67 U/L
ALT SERPL-CCNC: 26 U/L
AMYLASE/CREAT SERPL: 47 U/L
ANION GAP SERPL CALC-SCNC: 12 MMOL/L
AST SERPL-CCNC: 25 U/L
BILIRUB SERPL-MCNC: 0.2 MG/DL
BUN SERPL-MCNC: 19 MG/DL
CALCIUM SERPL-MCNC: 8.9 MG/DL
CHLORIDE SERPL-SCNC: 102 MMOL/L
CO2 SERPL-SCNC: 27 MMOL/L
CORTIS SERPL-MCNC: 4.8 UG/DL
CREAT SERPL-MCNC: 1.01 MG/DL
LPL SERPL-CCNC: 24 U/L
POTASSIUM SERPL-SCNC: 4 MMOL/L
PROT SERPL-MCNC: 6.6 G/DL
SODIUM SERPL-SCNC: 141 MMOL/L
T3RU NFR SERPL: 1.07 INDEX
T4 SERPL-MCNC: 6.7 UG/DL

## 2019-02-14 ENCOUNTER — APPOINTMENT (OUTPATIENT)
Dept: INFUSION THERAPY | Facility: HOSPITAL | Age: 59
End: 2019-02-14

## 2019-02-15 DIAGNOSIS — Z51.11 ENCOUNTER FOR ANTINEOPLASTIC CHEMOTHERAPY: ICD-10-CM

## 2019-02-26 ENCOUNTER — OUTPATIENT (OUTPATIENT)
Dept: OUTPATIENT SERVICES | Facility: HOSPITAL | Age: 59
LOS: 1 days | Discharge: HOME | End: 2019-02-26

## 2019-02-26 DIAGNOSIS — C43.39 MALIGNANT MELANOMA OF OTHER PARTS OF FACE: ICD-10-CM

## 2019-03-01 ENCOUNTER — TRANSCRIPTION ENCOUNTER (OUTPATIENT)
Age: 59
End: 2019-03-01

## 2019-03-07 ENCOUNTER — APPOINTMENT (OUTPATIENT)
Dept: HEMATOLOGY ONCOLOGY | Facility: CLINIC | Age: 59
End: 2019-03-07
Payer: MEDICAID

## 2019-03-07 ENCOUNTER — RESULT REVIEW (OUTPATIENT)
Age: 59
End: 2019-03-07

## 2019-03-07 VITALS
SYSTOLIC BLOOD PRESSURE: 150 MMHG | BODY MASS INDEX: 29.23 KG/M2 | HEART RATE: 109 BPM | OXYGEN SATURATION: 99 % | RESPIRATION RATE: 16 BRPM | TEMPERATURE: 98 F | WEIGHT: 221.56 LBS | DIASTOLIC BLOOD PRESSURE: 80 MMHG

## 2019-03-07 LAB
ALBUMIN SERPL ELPH-MCNC: 3.8 G/DL
ALP BLD-CCNC: 57 U/L
ALT SERPL-CCNC: 21 U/L
ANION GAP SERPL CALC-SCNC: 12 MMOL/L
AST SERPL-CCNC: 23 U/L
BILIRUB SERPL-MCNC: 0.4 MG/DL
BUN SERPL-MCNC: 15 MG/DL
CALCIUM SERPL-MCNC: 9.1 MG/DL
CHLORIDE SERPL-SCNC: 105 MMOL/L
CO2 SERPL-SCNC: 26 MMOL/L
CREAT SERPL-MCNC: 1.04 MG/DL
GLUCOSE SERPL-MCNC: 105 MG/DL
HCT VFR BLD CALC: 38.9 % — LOW (ref 39–50)
HGB BLD-MCNC: 13.9 G/DL — SIGNIFICANT CHANGE UP (ref 13–17)
MCHC RBC-ENTMCNC: 31.9 PG — SIGNIFICANT CHANGE UP (ref 27–34)
MCHC RBC-ENTMCNC: 35.7 G/DL — SIGNIFICANT CHANGE UP (ref 32–36)
MCV RBC AUTO: 89.4 FL — SIGNIFICANT CHANGE UP (ref 80–100)
PLATELET # BLD AUTO: 205 K/UL — SIGNIFICANT CHANGE UP (ref 150–400)
POTASSIUM SERPL-SCNC: 3.8 MMOL/L
PROT SERPL-MCNC: 6.2 G/DL
RBC # BLD: 4.36 M/UL — SIGNIFICANT CHANGE UP (ref 4.2–5.8)
RBC # FLD: 11.5 % — SIGNIFICANT CHANGE UP (ref 10.3–14.5)
SODIUM SERPL-SCNC: 143 MMOL/L
WBC # BLD: 6.6 K/UL — SIGNIFICANT CHANGE UP (ref 3.8–10.5)
WBC # FLD AUTO: 6.6 K/UL — SIGNIFICANT CHANGE UP (ref 3.8–10.5)

## 2019-03-07 PROCEDURE — 99215 OFFICE O/P EST HI 40 MIN: CPT

## 2019-03-07 NOTE — REASON FOR VISIT
[Follow-Up Visit] : a follow-up [FreeTextEntry2] : I am here for the follow up for the every four weeks immune treatment

## 2019-03-07 NOTE — HISTORY OF PRESENT ILLNESS
[Disease: _____________________] : Disease: [unfilled] [T: ___] : T[unfilled] [N: ___] : N[unfilled] [M: ___] : M[unfilled] [AJCC Stage: ____] : AJCC Stage: [unfilled] [Treatment Protocol] : Treatment Protocol [Cardiovascular] : Cardiovascular [Constitutional] : Constitutional [ENT] : ENT [Dermatologic] : Dermatologic [Endocrine] : Endocrine [Gastrointestinal] : Gastrointestinal [Genitourinary] : Genitourinary [Gynecologic] : Gynecologic [Infectious] : Infectious [Musculoskeletal] : Musculoskeletal [Neurologic] : Neurologic [Pain] : Pain [Pulmonary] : Pulmonary [Hematologic] : Hematologic [Date: ____________] : Patient's last distress assessment performed on [unfilled]. [0 - No Distress] : Distress Level: 0 [de-identified] : This 58 year old male describes a mas in the right lower jaw. The patient has a history of dental discomfort and he had a 3rd molar resection (he describes it as a wisdom tooth in the last week of June 2018. Approximately 4 weeks later he noted persistent swelling in the area and he was given a course of antibiotic treatment beneath the right jaw extending to the right buccal area in the last week of July 2018. After one week there was no definite shrinkage, and he went to an urgent care center; a CXR was requested and the right jaw mass was noted; he was referred to [facility] ER  and  CT scan was requested of the chest and ST of the neck and jaw.\par The  CT of the chest showed a  2.5 cm mass of the left lingula .He was referred for follow up by an oral surgeon Dr Jimenez in [facility] who did an incision and drainage and biopsies of the  mandible on August 18 2018. Finding revealed malignant melanoma as Dr Jimenez had sent the slide to  ERIKA GARCIA  for review by Dr Snyder\par He was seen by a primary care physician KRISHAN Peralta and a pulmonologist Tana Scott.\par A CT/PET performed on September 3 2018; the scan was described as showing a FG G avid mass in the right mandible with an S U V of  and an abnormality in the left  lung lingula that measures 2.0 X 3.1 cm and is  F D G avid S U V 15.5 ; there is a second left upper lobe nodule that is small and it was  non FD G avid; he has a right leg intramuscular lesion with low F D G  uptake. Intramuscular lesions and chest wall lesions are thought to be neurofibroma and are not F D G avid. He has a right renal cyst. \par The patient has no history of skin malignant melanoma. He has had a recent assessment  by dermatology of lesions of the right arm and left bridge of nose. No diagnosis of malignancy was made.\par There is a past medical history of resection of neurofibromas; He dose not know of neurofibromas in his past family history including his natural mother and his natural father; he is adopted.  [de-identified] : metastatic melanoma [de-identified] : tumor cells positive for S 100, Melan A ,SOX 10, HMG 45.\par negative for T T F 1, A E 1.3.,p 40\par positive c kit [FreeTextEntry1] : nivolumab 480 mg IV monthly (fifth month of therapy; s/p combination ipilumab- nivolumab followed by 3 months single agent nivolumab)  [de-identified] : The patient had called on February 19 complaining of increased fatigue; He has a mildly low serum cortisol level. He was prescribed hydrocortisone therapy; He is also taking losartan. He had fatigue; he recovered after one day of rest.

## 2019-03-07 NOTE — ASSESSMENT
[Supportive] : Goals of care discussed with patient: Supportive [Palliative Care Plan] : not applicable at this time [FreeTextEntry1] : Wali Montoya is a 58 year old male with metastatic Melanoma without a known skin primary. 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 the lesions have regressed with immune therapy and he is now on monthly nivolumab. Recent fatigue may be due to incidental viral illness but his steroid level was slightly low and he has begun the use of hydrocortisone. The relief of symptoms occurred the following day too soon for a drug therapeutic effect.\par His dermatologist has requested for him  an eye examination, and I have referred him to visit Dr Sathya Pino. The physical examination has not supported a diagnosis of uveal melanoma; however he is PDGF positive. he is scheduled for immune therapy in one week. RTC one month.

## 2019-03-08 LAB
T3FREE SERPL-MCNC: 4.51 PG/ML
T4 FREE SERPL-MCNC: 1 NG/DL
TSH SERPL-ACNC: 0.08 UIU/ML

## 2019-03-14 ENCOUNTER — APPOINTMENT (OUTPATIENT)
Dept: INFUSION THERAPY | Facility: HOSPITAL | Age: 59
End: 2019-03-14

## 2019-03-15 DIAGNOSIS — Z51.11 ENCOUNTER FOR ANTINEOPLASTIC CHEMOTHERAPY: ICD-10-CM

## 2019-03-19 ENCOUNTER — MEDICATION RENEWAL (OUTPATIENT)
Age: 59
End: 2019-03-19

## 2019-03-19 LAB
CORTICOSTEROID BIND GLOBULIN: 1.5 MG/DL
CORTIS SERPL-MCNC: 1.5 UG/DL
CORTISOL, FREE: 0.06 UG/DL
PFCX: 3.9 %

## 2019-03-29 ENCOUNTER — OUTPATIENT (OUTPATIENT)
Dept: OUTPATIENT SERVICES | Facility: HOSPITAL | Age: 59
LOS: 1 days | Discharge: HOME | End: 2019-03-29

## 2019-03-29 DIAGNOSIS — C43.39 MALIGNANT MELANOMA OF OTHER PARTS OF FACE: ICD-10-CM

## 2019-04-04 ENCOUNTER — APPOINTMENT (OUTPATIENT)
Dept: DERMATOLOGY | Facility: CLINIC | Age: 59
End: 2019-04-04
Payer: MEDICAID

## 2019-04-04 PROCEDURE — 99214 OFFICE O/P EST MOD 30 MIN: CPT | Mod: 25

## 2019-04-04 PROCEDURE — 17000 DESTRUCT PREMALG LESION: CPT

## 2019-04-04 NOTE — HISTORY OF PRESENT ILLNESS
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 Pt reports good toleration of therapy with minimal side effects. Patient denies any new or changing skin lesions. Patient states his chemotherapy regimen was 4 months of combo therapy and now 8 months of nivolumab monotherapy until October 2019. No visit to eye doctor yet, scheduled to see onc tomorrow. \par \par

## 2019-04-04 NOTE — PHYSICAL EXAM
[Alert] : alert [Oriented x 3] : ~L oriented x 3 [Well Nourished] : well nourished [Conjunctiva Non-injected] : conjunctiva non-injected [No Visual Lymphadenopathy] : no visual  lymphadenopathy [No Clubbing] : no clubbing [No Edema] : no edema [No Bromhidrosis] : no bromhidrosis [No Chromhidrosis] : no chromhidrosis [Full Body Skin Exam Performed] : performed [FreeTextEntry3] : gritty papule on left side of nose\par No lesions noted in the anal, genital or oral mucosa \par No cervical, axillary, or inguinal LAD \par \par

## 2019-04-04 NOTE — ADDENDUM
[FreeTextEntry1] : I, Lui Moore, acted solely as a scribe for Dr. July Johnson on 04/04/2019 at 11:30am.

## 2019-04-05 ENCOUNTER — APPOINTMENT (OUTPATIENT)
Dept: HEMATOLOGY ONCOLOGY | Facility: CLINIC | Age: 59
End: 2019-04-05
Payer: MEDICAID

## 2019-04-05 ENCOUNTER — RESULT REVIEW (OUTPATIENT)
Age: 59
End: 2019-04-05

## 2019-04-05 VITALS
OXYGEN SATURATION: 99 % | DIASTOLIC BLOOD PRESSURE: 100 MMHG | TEMPERATURE: 98.5 F | HEART RATE: 106 BPM | SYSTOLIC BLOOD PRESSURE: 125 MMHG | RESPIRATION RATE: 17 BRPM | BODY MASS INDEX: 28.79 KG/M2 | WEIGHT: 218.24 LBS

## 2019-04-05 LAB
HCT VFR BLD CALC: 41.6 % — SIGNIFICANT CHANGE UP (ref 39–50)
HGB BLD-MCNC: 14.9 G/DL — SIGNIFICANT CHANGE UP (ref 13–17)
MCHC RBC-ENTMCNC: 32 PG — SIGNIFICANT CHANGE UP (ref 27–34)
MCHC RBC-ENTMCNC: 35.7 G/DL — SIGNIFICANT CHANGE UP (ref 32–36)
MCV RBC AUTO: 89.4 FL — SIGNIFICANT CHANGE UP (ref 80–100)
PLATELET # BLD AUTO: 217 K/UL — SIGNIFICANT CHANGE UP (ref 150–400)
RBC # BLD: 4.65 M/UL — SIGNIFICANT CHANGE UP (ref 4.2–5.8)
RBC # FLD: 11.9 % — SIGNIFICANT CHANGE UP (ref 10.3–14.5)
WBC # BLD: 7.1 K/UL — SIGNIFICANT CHANGE UP (ref 3.8–10.5)
WBC # FLD AUTO: 7.1 K/UL — SIGNIFICANT CHANGE UP (ref 3.8–10.5)

## 2019-04-05 PROCEDURE — 99215 OFFICE O/P EST HI 40 MIN: CPT

## 2019-04-08 LAB
ALBUMIN SERPL ELPH-MCNC: 4.2 G/DL
ALP BLD-CCNC: 67 U/L
ALT SERPL-CCNC: 16 U/L
ANION GAP SERPL CALC-SCNC: 10 MMOL/L
AST SERPL-CCNC: 23 U/L
BILIRUB SERPL-MCNC: 0.5 MG/DL
BUN SERPL-MCNC: 13 MG/DL
CALCIUM SERPL-MCNC: 9.1 MG/DL
CHLORIDE SERPL-SCNC: 104 MMOL/L
CO2 SERPL-SCNC: 27 MMOL/L
CREAT SERPL-MCNC: 1.05 MG/DL
GLUCOSE SERPL-MCNC: 95 MG/DL
POTASSIUM SERPL-SCNC: 4.2 MMOL/L
PROT SERPL-MCNC: 7.2 G/DL
SODIUM SERPL-SCNC: 141 MMOL/L
T3FREE SERPL-MCNC: 4.06 PG/ML
T4 FREE SERPL-MCNC: 0.9 NG/DL
TSH SERPL-ACNC: 3.15 UIU/ML

## 2019-04-09 NOTE — ASSESSMENT
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 He appeared well today and his physical examination findings continue to demonstrate a favorable response to treatment. Plan to request imaging studies in June 2019. Blood studies done today. Proceed with treatment next week. Return to the office in 4 weeks. Seen and examined in conjunction with Mariah ALAN.

## 2019-04-09 NOTE — HISTORY OF PRESENT ILLNESS
[Disease: _____________________] : Disease: [unfilled] [T: ___] : T[unfilled] [N: ___] : N[unfilled] [M: ___] : M[unfilled] [AJCC Stage: ____] : AJCC Stage: [unfilled] [Treatment Protocol] : Treatment Protocol [Cardiovascular] : Cardiovascular [Constitutional] : Constitutional [ENT] : ENT [Dermatologic] : Dermatologic [Endocrine] : Endocrine [Gastrointestinal] : Gastrointestinal [Genitourinary] : Genitourinary [Gynecologic] : Gynecologic [Infectious] : Infectious [Musculoskeletal] : Musculoskeletal [Neurologic] : Neurologic [Pain] : Pain [Pulmonary] : Pulmonary [Hematologic] : Hematologic [Date: ____________] : Patient's last distress assessment performed on [unfilled]. [de-identified] : metastatic melanoma [de-identified] : This 58 year old male describes a mas in the right lower jaw. The patient has a history of dental discomfort and he had a 3rd molar resection (he describes it as a wisdom tooth in the last week of June 2018. Approximately 4 weeks later he noted persistent swelling in the area and he was given a course of antibiotic treatment beneath the right jaw extending to the right buccal area in the last week of July 2018. After one week there was no definite shrinkage, and he went to an urgent care center; a CXR was requested and the right jaw mass was noted; he was referred to [facility] ER  and  CT scan was requested of the chest and ST of the neck and jaw.\par The  CT of the chest showed a  2.5 cm mass of the left lingula .He was referred for follow up by an oral surgeon Dr Jimenez in [facility] who did an incision and drainage and biopsies of the  mandible on August 18 2018. Finding revealed malignant melanoma as Dr Jimenez had sent the slide to  ERIKA GARCIA  for review by Dr Snyder\par He was seen by a primary care physician KRISHAN Peralta and a pulmonologist Tana Scott.\par A CT/PET performed on September 3 2018; the scan was described as showing a FG G avid mass in the right mandible with an S U V of  and an abnormality in the left  lung lingula that measures 2.0 X 3.1 cm and is  F D G avid S U V 15.5 ; there is a second left upper lobe nodule that is small and it was  non FD G avid; he has a right leg intramuscular lesion with low F D G  uptake. Intramuscular lesions and chest wall lesions are thought to be neurofibroma and are not F D G avid. He has a right renal cyst. \par The patient has no history of skin malignant melanoma. He has had a recent assessment  by dermatology of lesions of the right arm and left bridge of nose. No diagnosis of malignancy was made.\par There is a past medical history of resection of neurofibromas; He dose not know of neurofibromas in his past family history including his natural mother and his natural father; he is adopted.  [de-identified] : tumor cells positive for S 100, Melan A ,SOX 10, HMG 45.\par negative for T T F 1, A E 1.3.,p 40\par positive c kit [de-identified] : Patient presented to the office for routine follow-up visit and lab work-up. He was recently evaluated by dermatology, he currently feels fine.  [FreeTextEntry1] : s/p 4 cycles of combination ipilimumab + nivolumab, followed by single agent monthly nivolumab; currently on nivolumab 480 mg IV monthly

## 2019-04-11 ENCOUNTER — APPOINTMENT (OUTPATIENT)
Dept: INFUSION THERAPY | Facility: HOSPITAL | Age: 59
End: 2019-04-11

## 2019-04-12 DIAGNOSIS — Z51.11 ENCOUNTER FOR ANTINEOPLASTIC CHEMOTHERAPY: ICD-10-CM

## 2019-04-15 LAB
CORTICOSTEROID BIND GLOBULIN: 1.7 MG/DL
CORTIS SERPL-MCNC: 7.7 UG/DL
CORTISOL, FREE: 0.57 UG/DL
PFCX: 7.3 %

## 2019-04-29 ENCOUNTER — OUTPATIENT (OUTPATIENT)
Dept: OUTPATIENT SERVICES | Facility: HOSPITAL | Age: 59
LOS: 1 days | Discharge: HOME | End: 2019-04-29

## 2019-04-29 DIAGNOSIS — C43.39 MALIGNANT MELANOMA OF OTHER PARTS OF FACE: ICD-10-CM

## 2019-05-02 ENCOUNTER — RESULT REVIEW (OUTPATIENT)
Age: 59
End: 2019-05-02

## 2019-05-02 ENCOUNTER — APPOINTMENT (OUTPATIENT)
Dept: HEMATOLOGY ONCOLOGY | Facility: CLINIC | Age: 59
End: 2019-05-02
Payer: MEDICAID

## 2019-05-02 VITALS
WEIGHT: 218.24 LBS | SYSTOLIC BLOOD PRESSURE: 180 MMHG | TEMPERATURE: 99.4 F | BODY MASS INDEX: 28.79 KG/M2 | RESPIRATION RATE: 17 BRPM | OXYGEN SATURATION: 98 % | DIASTOLIC BLOOD PRESSURE: 100 MMHG | HEART RATE: 112 BPM

## 2019-05-02 LAB
HCT VFR BLD CALC: 40.2 % — SIGNIFICANT CHANGE UP (ref 39–50)
HGB BLD-MCNC: 14.5 G/DL — SIGNIFICANT CHANGE UP (ref 13–17)
MCHC RBC-ENTMCNC: 32.7 PG — SIGNIFICANT CHANGE UP (ref 27–34)
MCHC RBC-ENTMCNC: 36 G/DL — SIGNIFICANT CHANGE UP (ref 32–36)
MCV RBC AUTO: 90.7 FL — SIGNIFICANT CHANGE UP (ref 80–100)
PLATELET # BLD AUTO: 214 K/UL — SIGNIFICANT CHANGE UP (ref 150–400)
RBC # BLD: 4.43 M/UL — SIGNIFICANT CHANGE UP (ref 4.2–5.8)
RBC # FLD: 12 % — SIGNIFICANT CHANGE UP (ref 10.3–14.5)
WBC # BLD: 7.7 K/UL — SIGNIFICANT CHANGE UP (ref 3.8–10.5)
WBC # FLD AUTO: 7.7 K/UL — SIGNIFICANT CHANGE UP (ref 3.8–10.5)

## 2019-05-02 PROCEDURE — 99214 OFFICE O/P EST MOD 30 MIN: CPT

## 2019-05-02 RX ORDER — LOSARTAN POTASSIUM 50 MG/1
50 TABLET, FILM COATED ORAL DAILY
Qty: 30 | Refills: 3 | Status: COMPLETED | COMMUNITY
Start: 2018-08-27 | End: 2019-05-02

## 2019-05-02 NOTE — ASSESSMENT
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 He appeared well today and his physical examination findings continue to demonstrate a favorable response to treatment. Plan to request imaging studies in June 2019. Blood studies done today. His blood pressure medication was adjusted: losartan was increased to 100 mg daily due to elevated BP reading earlier today (180/100). Proceed with treatment next week. Return to the office in 4 weeks. Seen and examined in conjunction with Mariah ALAN.

## 2019-05-02 NOTE — HISTORY OF PRESENT ILLNESS
[Disease: _____________________] : Disease: [unfilled] [T: ___] : T[unfilled] [N: ___] : N[unfilled] [M: ___] : M[unfilled] [AJCC Stage: ____] : AJCC Stage: [unfilled] [Treatment Protocol] : Treatment Protocol [de-identified] : This 58 year old male describes a mas in the right lower jaw. The patient has a history of dental discomfort and he had a 3rd molar resection (he describes it as a wisdom tooth in the last week of June 2018. Approximately 4 weeks later he noted persistent swelling in the area and he was given a course of antibiotic treatment beneath the right jaw extending to the right buccal area in the last week of July 2018. After one week there was no definite shrinkage, and he went to an urgent care center; a CXR was requested and the right jaw mass was noted; he was referred to [facility] ER  and  CT scan was requested of the chest and ST of the neck and jaw.\par The  CT of the chest showed a  2.5 cm mass of the left lingula .He was referred for follow up by an oral surgeon Dr Jimenez in [facility] who did an incision and drainage and biopsies of the  mandible on August 18 2018. Finding revealed malignant melanoma as Dr Jimenez had sent the slide to  ERIKA GARCIA  for review by Dr Snyder\par He was seen by a primary care physician KRISHAN Peralta and a pulmonologist Tana Scott.\par A CT/PET performed on September 3 2018; the scan was described as showing a FG G avid mass in the right mandible with an S U V of  and an abnormality in the left  lung lingula that measures 2.0 X 3.1 cm and is  F D G avid S U V 15.5 ; there is a second left upper lobe nodule that is small and it was  non FD G avid; he has a right leg intramuscular lesion with low F D G  uptake. Intramuscular lesions and chest wall lesions are thought to be neurofibroma and are not F D G avid. He has a right renal cyst. \par The patient has no history of skin malignant melanoma. He has had a recent assessment  by dermatology of lesions of the right arm and left bridge of nose. No diagnosis of malignancy was made.\par There is a past medical history of resection of neurofibromas; He dose not know of neurofibromas in his past family history including his natural mother and his natural father; he is adopted.  [de-identified] : metastatic melanoma [de-identified] : tumor cells positive for S 100, Melan A ,SOX 10, HMG 45.\par negative for T T F 1, A E 1.3.,p 40\par positive c kit [FreeTextEntry1] : s/p 4 cycles of combination ipilimumab + nivolumab, currently on nivolumab 480 mg IV monthly [de-identified] : Patient presented to the office for routine follow-up visit. He requested a refill of his blood pressure medication. He continues to take his hydrocortisone as directed.

## 2019-05-06 LAB
ALBUMIN SERPL ELPH-MCNC: 4.3 G/DL
ALP BLD-CCNC: 62 U/L
ALT SERPL-CCNC: 17 U/L
ANION GAP SERPL CALC-SCNC: 13 MMOL/L
AST SERPL-CCNC: 21 U/L
BILIRUB SERPL-MCNC: 0.4 MG/DL
BUN SERPL-MCNC: 16 MG/DL
CALCIUM SERPL-MCNC: 9.1 MG/DL
CHLORIDE SERPL-SCNC: 102 MMOL/L
CO2 SERPL-SCNC: 25 MMOL/L
CREAT SERPL-MCNC: 0.95 MG/DL
GLUCOSE SERPL-MCNC: 152 MG/DL
POTASSIUM SERPL-SCNC: 3.7 MMOL/L
PROT SERPL-MCNC: 6.8 G/DL
SODIUM SERPL-SCNC: 140 MMOL/L
T3FREE SERPL-MCNC: 3.83 PG/ML
T4 FREE SERPL-MCNC: 0.9 NG/DL
TSH SERPL-ACNC: 3.41 UIU/ML

## 2019-05-09 ENCOUNTER — APPOINTMENT (OUTPATIENT)
Dept: INFUSION THERAPY | Facility: HOSPITAL | Age: 59
End: 2019-05-09

## 2019-05-10 DIAGNOSIS — Z51.11 ENCOUNTER FOR ANTINEOPLASTIC CHEMOTHERAPY: ICD-10-CM

## 2019-05-13 LAB
CORTICOSTEROID BIND GLOBULIN: 1.9 MG/DL
CORTIS SERPL-MCNC: 3 UG/DL
CORTISOL, FREE: 0.1 UG/DL
PFCX: 3.4 %

## 2019-06-10 ENCOUNTER — OUTPATIENT (OUTPATIENT)
Dept: OUTPATIENT SERVICES | Facility: HOSPITAL | Age: 59
LOS: 1 days | Discharge: HOME | End: 2019-06-10

## 2019-06-10 DIAGNOSIS — C43.39 MALIGNANT MELANOMA OF OTHER PARTS OF FACE: ICD-10-CM

## 2019-06-11 ENCOUNTER — FORM ENCOUNTER (OUTPATIENT)
Age: 59
End: 2019-06-11

## 2019-06-12 ENCOUNTER — APPOINTMENT (OUTPATIENT)
Dept: CT IMAGING | Facility: IMAGING CENTER | Age: 59
End: 2019-06-12
Payer: MEDICAID

## 2019-06-12 ENCOUNTER — OUTPATIENT (OUTPATIENT)
Dept: OUTPATIENT SERVICES | Facility: HOSPITAL | Age: 59
LOS: 1 days | End: 2019-06-12
Payer: MEDICAID

## 2019-06-12 DIAGNOSIS — R91.8 OTHER NONSPECIFIC ABNORMAL FINDING OF LUNG FIELD: ICD-10-CM

## 2019-06-12 PROCEDURE — 70491 CT SOFT TISSUE NECK W/DYE: CPT | Mod: 26

## 2019-06-12 PROCEDURE — 71260 CT THORAX DX C+: CPT | Mod: 26

## 2019-06-12 PROCEDURE — 82565 ASSAY OF CREATININE: CPT

## 2019-06-12 PROCEDURE — 70487 CT MAXILLOFACIAL W/DYE: CPT | Mod: 26

## 2019-06-12 PROCEDURE — 70491 CT SOFT TISSUE NECK W/DYE: CPT

## 2019-06-12 PROCEDURE — 70487 CT MAXILLOFACIAL W/DYE: CPT

## 2019-06-12 PROCEDURE — 74177 CT ABD & PELVIS W/CONTRAST: CPT

## 2019-06-12 PROCEDURE — 71260 CT THORAX DX C+: CPT

## 2019-06-12 PROCEDURE — 74177 CT ABD & PELVIS W/CONTRAST: CPT | Mod: 26

## 2019-06-13 ENCOUNTER — RESULT REVIEW (OUTPATIENT)
Age: 59
End: 2019-06-13

## 2019-06-13 ENCOUNTER — LABORATORY RESULT (OUTPATIENT)
Age: 59
End: 2019-06-13

## 2019-06-13 ENCOUNTER — APPOINTMENT (OUTPATIENT)
Dept: INFUSION THERAPY | Facility: HOSPITAL | Age: 59
End: 2019-06-13

## 2019-06-13 ENCOUNTER — APPOINTMENT (OUTPATIENT)
Dept: HEMATOLOGY ONCOLOGY | Facility: CLINIC | Age: 59
End: 2019-06-13
Payer: MEDICAID

## 2019-06-13 VITALS
TEMPERATURE: 98.9 F | BODY MASS INDEX: 29.1 KG/M2 | HEART RATE: 103 BPM | SYSTOLIC BLOOD PRESSURE: 167 MMHG | OXYGEN SATURATION: 98 % | WEIGHT: 220.57 LBS | RESPIRATION RATE: 16 BRPM | DIASTOLIC BLOOD PRESSURE: 97 MMHG

## 2019-06-13 LAB
BASOPHILS # BLD AUTO: 0 K/UL — SIGNIFICANT CHANGE UP (ref 0–0.2)
BASOPHILS NFR BLD AUTO: 0.3 % — SIGNIFICANT CHANGE UP (ref 0–2)
EOSINOPHIL # BLD AUTO: 0 K/UL — SIGNIFICANT CHANGE UP (ref 0–0.5)
EOSINOPHIL NFR BLD AUTO: 0.7 % — SIGNIFICANT CHANGE UP (ref 0–6)
HCT VFR BLD CALC: 41.6 % — SIGNIFICANT CHANGE UP (ref 39–50)
HGB BLD-MCNC: 14.6 G/DL — SIGNIFICANT CHANGE UP (ref 13–17)
LYMPHOCYTES # BLD AUTO: 1.6 K/UL — SIGNIFICANT CHANGE UP (ref 1–3.3)
LYMPHOCYTES # BLD AUTO: 28.8 % — SIGNIFICANT CHANGE UP (ref 13–44)
MCHC RBC-ENTMCNC: 31.4 PG — SIGNIFICANT CHANGE UP (ref 27–34)
MCHC RBC-ENTMCNC: 35.2 G/DL — SIGNIFICANT CHANGE UP (ref 32–36)
MCV RBC AUTO: 89.2 FL — SIGNIFICANT CHANGE UP (ref 80–100)
MONOCYTES # BLD AUTO: 0.4 K/UL — SIGNIFICANT CHANGE UP (ref 0–0.9)
MONOCYTES NFR BLD AUTO: 7.9 % — SIGNIFICANT CHANGE UP (ref 2–14)
NEUTROPHILS # BLD AUTO: 3.4 K/UL — SIGNIFICANT CHANGE UP (ref 1.8–7.4)
NEUTROPHILS NFR BLD AUTO: 62.4 % — SIGNIFICANT CHANGE UP (ref 43–77)
PLATELET # BLD AUTO: 204 K/UL — SIGNIFICANT CHANGE UP (ref 150–400)
RBC # BLD: 4.66 M/UL — SIGNIFICANT CHANGE UP (ref 4.2–5.8)
RBC # FLD: 11.8 % — SIGNIFICANT CHANGE UP (ref 10.3–14.5)
WBC # BLD: 5.5 K/UL — SIGNIFICANT CHANGE UP (ref 3.8–10.5)
WBC # FLD AUTO: 5.5 K/UL — SIGNIFICANT CHANGE UP (ref 3.8–10.5)

## 2019-06-13 PROCEDURE — 99214 OFFICE O/P EST MOD 30 MIN: CPT

## 2019-06-13 NOTE — HISTORY OF PRESENT ILLNESS
[Treatment Protocol] : Treatment Protocol [de-identified] : This 59 year old male describes a mas in the right lower jaw. The patient has a history of dental discomfort and he had a 3rd molar resection (he describes it as a wisdom tooth in the last week of June 2018. Approximately 4 weeks later he noted persistent swelling in the area and he was given a course of antibiotic treatment beneath the right jaw extending to the right buccal area in the last week of July 2018. After one week there was no definite shrinkage, and he went to an urgent care center; a CXR was requested and the right jaw mass was noted; he was referred to [facility] ER  and  CT scan was requested of the chest and ST of the neck and jaw.\par The  CT of the chest showed a  2.5 cm mass of the left lingula .He was referred for follow up by an oral surgeon Dr Jimenez in [facility] who did an incision and drainage and biopsies of the  mandible on August 18 2018. Finding revealed malignant melanoma as Dr Jimenez had sent the slide to  ERIKA GARCIA  for review by Dr Snyder\par He was seen by a primary care physician KRISHAN Peralta and a pulmonologist Tana Scott.\par A CT/PET performed on September 3 2018; the scan was described as showing a FG G avid mass in the right mandible with an S U V of  and an abnormality in the left  lung lingula that measures 2.0 X 3.1 cm and is  F D G avid S U V 15.5 ; there is a second left upper lobe nodule that is small and it was  non FD G avid; he has a right leg intramuscular lesion with low F D G  uptake. Intramuscular lesions and chest wall lesions are thought to be neurofibroma and are not F D G avid. He has a right renal cyst. \par The patient has no history of skin malignant melanoma. He has had a recent assessment  by dermatology of lesions of the right arm and left bridge of nose. No diagnosis of malignancy was made.\par There is a past medical history of resection of neurofibromas; He dose not know of neurofibromas in his past family history including his natural mother and his natural father; he is adopted.  [de-identified] : metastatic melanoma [de-identified] : tumor cells positive for S 100, Melan A ,SOX 10, HMG 45.\par negative for T T F 1, A E 1.3.,p 40\par positive c kit [FreeTextEntry1] : s/p 4 cycles of combination ipilimumab + nivolumab, currently on nivolumab 480 mg IV monthly [de-identified] : Patient completed follow-up scans yesterday, presented to the office for monthly Opdivo today.

## 2019-06-13 NOTE — ASSESSMENT
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 He appeared well today and his physical examination findings continue to demonstrate a favorable response to treatment. Some of his CT scan results from yesterday demonstrated the following: lingular nodule, slightly decreased in size since 1/4/2019, unchanged pleural-based left upper lobe nodule and low-attenuation mass involving the left lateral chest wall. Stable appearance of the abdomen and pelvis since 1/4/2019. CT head and maxillofacial scan results pending. Blood studies done today. Return to the office in 4 weeks. Seen and examined in conjunction with Mustapha Barnes.

## 2019-06-14 DIAGNOSIS — Z51.11 ENCOUNTER FOR ANTINEOPLASTIC CHEMOTHERAPY: ICD-10-CM

## 2019-06-21 ENCOUNTER — APPOINTMENT (OUTPATIENT)
Dept: OPHTHALMOLOGY | Facility: CLINIC | Age: 59
End: 2019-06-21
Payer: MEDICAID

## 2019-06-21 PROCEDURE — 99203 OFFICE O/P NEW LOW 30 MIN: CPT

## 2019-07-01 ENCOUNTER — MEDICATION RENEWAL (OUTPATIENT)
Age: 59
End: 2019-07-01

## 2019-07-08 ENCOUNTER — OUTPATIENT (OUTPATIENT)
Dept: OUTPATIENT SERVICES | Facility: HOSPITAL | Age: 59
LOS: 1 days | Discharge: HOME | End: 2019-07-08

## 2019-07-08 DIAGNOSIS — C43.39 MALIGNANT MELANOMA OF OTHER PARTS OF FACE: ICD-10-CM

## 2019-07-11 ENCOUNTER — LABORATORY RESULT (OUTPATIENT)
Age: 59
End: 2019-07-11

## 2019-07-11 ENCOUNTER — APPOINTMENT (OUTPATIENT)
Dept: HEMATOLOGY ONCOLOGY | Facility: CLINIC | Age: 59
End: 2019-07-11
Payer: MEDICAID

## 2019-07-11 ENCOUNTER — APPOINTMENT (OUTPATIENT)
Dept: INFUSION THERAPY | Facility: HOSPITAL | Age: 59
End: 2019-07-11

## 2019-07-11 VITALS
BODY MASS INDEX: 29.17 KG/M2 | DIASTOLIC BLOOD PRESSURE: 105 MMHG | WEIGHT: 221.12 LBS | HEART RATE: 87 BPM | TEMPERATURE: 98.6 F | SYSTOLIC BLOOD PRESSURE: 157 MMHG | RESPIRATION RATE: 18 BRPM

## 2019-07-11 PROCEDURE — 99214 OFFICE O/P EST MOD 30 MIN: CPT

## 2019-07-11 NOTE — HISTORY OF PRESENT ILLNESS
[Disease: _____________________] : Disease: [unfilled] [T: ___] : T[unfilled] [N: ___] : N[unfilled] [M: ___] : M[unfilled] [AJCC Stage: ____] : AJCC Stage: [unfilled] [Treatment Protocol] : Treatment Protocol [Date: ____________] : Patient's last distress assessment performed on [unfilled]. [de-identified] : This 59 year old male describes a mas in the right lower jaw. The patient has a history of dental discomfort and he had a 3rd molar resection (he describes it as a wisdom tooth in the last week of June 2018. Approximately 4 weeks later he noted persistent swelling in the area and he was given a course of antibiotic treatment beneath the right jaw extending to the right buccal area in the last week of July 2018. After one week there was no definite shrinkage, and he went to an urgent care center; a CXR was requested and the right jaw mass was noted; he was referred to [facility] ER  and  CT scan was requested of the chest and ST of the neck and jaw.\par The  CT of the chest showed a  2.5 cm mass of the left lingula .He was referred for follow up by an oral surgeon Dr Jimenez in [facility] who did an incision and drainage and biopsies of the  mandible on August 18 2018. Finding revealed malignant melanoma as Dr Jimenez had sent the slide to  ERIKA GARCIA  for review by Dr Snyder\par He was seen by a primary care physician KRISHAN Peralta and a pulmonologist Tana Scott.\par A CT/PET performed on September 3 2018; the scan was described as showing a FG G avid mass in the right mandible with an S U V of  and an abnormality in the left  lung lingula that measures 2.0 X 3.1 cm and is  F D G avid S U V 15.5 ; there is a second left upper lobe nodule that is small and it was  non FD G avid; he has a right leg intramuscular lesion with low F D G  uptake. Intramuscular lesions and chest wall lesions are thought to be neurofibroma and are not F D G avid. He has a right renal cyst. \par The patient has no history of skin malignant melanoma. He has had a recent assessment  by dermatology of lesions of the right arm and left bridge of nose. No diagnosis of malignancy was made.\par There is a past medical history of resection of neurofibromas; He dose not know of neurofibromas in his past family history including his natural mother and his natural father; he is adopted.  [de-identified] : metastatic melanoma [FreeTextEntry1] : nivolumab 480 mg IV monthly (tenth month of therapy; s/p combination ipilimumab- nivolumab for 3 months  followed by single agent nivolumab)  [de-identified] : tumor cells positive for S 100, Melan A ,SOX 10, HMG 45.\par negative for T T F 1, A E 1.3.,p 40\par positive c kit [de-identified] : He has been feeling well. No new jaw mass. He denies experiencing any immunotherapy related side effects at this time. He has has no diplopia; he has seen Dr Piedra (ophthalmology).

## 2019-07-11 NOTE — PHYSICAL EXAM
[Fully active, able to carry on all pre-disease performance without restriction] : Status 0 - Fully active, able to carry on all pre-disease performance without restriction [Normal] : affect appropriate [de-identified] : skin peeling

## 2019-07-11 NOTE — ASSESSMENT
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 He appeared well today and his physical examination findings continue to demonstrate a favorable response to treatment. \par The patient discussed with Dr. 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 He is PDGF (c KIT) positive. Remain on hydrocortisone as directed. Blood studies done today. Return to the office in 4 weeks. Seen and examined in conjunction with Mustapha Barnes.

## 2019-07-12 DIAGNOSIS — Z51.11 ENCOUNTER FOR ANTINEOPLASTIC CHEMOTHERAPY: ICD-10-CM

## 2019-08-06 ENCOUNTER — OUTPATIENT (OUTPATIENT)
Dept: OUTPATIENT SERVICES | Facility: HOSPITAL | Age: 59
LOS: 1 days | Discharge: HOME | End: 2019-08-06

## 2019-08-06 DIAGNOSIS — C43.39 MALIGNANT MELANOMA OF OTHER PARTS OF FACE: ICD-10-CM

## 2019-08-08 ENCOUNTER — APPOINTMENT (OUTPATIENT)
Dept: INFUSION THERAPY | Facility: HOSPITAL | Age: 59
End: 2019-08-08

## 2019-08-08 ENCOUNTER — APPOINTMENT (OUTPATIENT)
Dept: HEMATOLOGY ONCOLOGY | Facility: CLINIC | Age: 59
End: 2019-08-08
Payer: MEDICAID

## 2019-08-08 VITALS
WEIGHT: 221 LBS | SYSTOLIC BLOOD PRESSURE: 140 MMHG | OXYGEN SATURATION: 98 % | TEMPERATURE: 98.2 F | RESPIRATION RATE: 18 BRPM | DIASTOLIC BLOOD PRESSURE: 90 MMHG | HEART RATE: 68 BPM | BODY MASS INDEX: 29.16 KG/M2

## 2019-08-08 PROCEDURE — 99215 OFFICE O/P EST HI 40 MIN: CPT

## 2019-08-08 NOTE — REASON FOR VISIT
[Follow-Up Visit] : a follow-up [FreeTextEntry2] : I have melanoma and I am on treatment with immune therapy

## 2019-08-08 NOTE — ASSESSMENT
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 He appeared well today and his physical examination findings continue to demonstrate a favorable response to treatment. Patient will take his blood pressure medication when he gets home later today (elevated reading of 161/109). Remain on hydrocortisone as directed. Blood studies done today. \par Discussion of plan of care to complete 12 months of therapy and then to observe with scans; he is agreeable to this plan. If growth of pulmonary lesion is seen , he may have option of removal or re institution of immune therapy. there is no standard of continuos immune therapy in controlled disease. Close follow up is recommended.  Return to the office in 4 weeks. Seen and examined in conjunction with Mariah ALAN.

## 2019-08-08 NOTE — PHYSICAL EXAM
[Fully active, able to carry on all pre-disease performance without restriction] : Status 0 - Fully active, able to carry on all pre-disease performance without restriction [Normal] : affect appropriate [de-identified] : skin peeling

## 2019-08-08 NOTE — HISTORY OF PRESENT ILLNESS
[Disease: _____________________] : Disease: [unfilled] [T: ___] : T[unfilled] [N: ___] : N[unfilled] [M: ___] : M[unfilled] [AJCC Stage: ____] : AJCC Stage: [unfilled] [Treatment Protocol] : Treatment Protocol [Date: ____________] : Patient's last distress assessment performed on [unfilled]. [0 - No Distress] : Distress Level: 0 [de-identified] : This 59 year old male describes a mas in the right lower jaw. The patient has a history of dental discomfort and he had a 3rd molar resection (he describes it as a wisdom tooth in the last week of June 2018. Approximately 4 weeks later he noted persistent swelling in the area and he was given a course of antibiotic treatment beneath the right jaw extending to the right buccal area in the last week of July 2018. After one week there was no definite shrinkage, and he went to an urgent care center; a CXR was requested and the right jaw mass was noted; he was referred to [facility] ER  and  CT scan was requested of the chest and ST of the neck and jaw.\par The  CT of the chest showed a  2.5 cm mass of the left lingula .He was referred for follow up by an oral surgeon Dr Jimenez in [facility] who did an incision and drainage and biopsies of the  mandible on August 18 2018. Finding revealed malignant melanoma as Dr Jimenez had sent the slide to  ERIKA GARCIA  for review by Dr Snyder\par He was seen by a primary care physician KRISHAN Peralta and a pulmonologist Tana Scott.\par A CT/PET performed on September 3 2018; the scan was described as showing a FG G avid mass in the right mandible with an S U V of  and an abnormality in the left  lung lingula that measures 2.0 X 3.1 cm and is  F D G avid S U V 15.5 ; there is a second left upper lobe nodule that is small and it was  non FD G avid; he has a right leg intramuscular lesion with low F D G  uptake. Intramuscular lesions and chest wall lesions are thought to be neurofibroma and are not F D G avid. He has a right renal cyst. \par The patient has no history of skin malignant melanoma. He has had a recent assessment  by dermatology of lesions of the right arm and left bridge of nose. No diagnosis of malignancy was made.\par There is a past medical history of resection of neurofibromas; He dose not know of neurofibromas in his past family history including his natural mother and his natural father; he is adopted.  [de-identified] : metastatic melanoma [de-identified] : tumor cells positive for S 100, Melan A ,SOX 10, HMG 45.\par negative for T T F 1, A E 1.3.,p 40\par positive c kit [FreeTextEntry1] : nivolumab 480 mg IV monthly (eleventh month of therapy; s/p combination ipilimumab- nivolumab followed by 8 months single agent nivolumab)  [de-identified] : He has been feeling well. No new jaw mass. he has been feeling well. He has has no diplopia. he has seen Dr Piedra\par His BP was noted to be elevated today, patient admitted to forget to take his medication.

## 2019-08-12 DIAGNOSIS — Z51.11 ENCOUNTER FOR ANTINEOPLASTIC CHEMOTHERAPY: ICD-10-CM

## 2019-08-28 ENCOUNTER — APPOINTMENT (OUTPATIENT)
Dept: DERMATOLOGY | Facility: CLINIC | Age: 59
End: 2019-08-28
Payer: MEDICAID

## 2019-08-28 PROCEDURE — 99214 OFFICE O/P EST MOD 30 MIN: CPT | Mod: 25

## 2019-08-28 PROCEDURE — 17000 DESTRUCT PREMALG LESION: CPT

## 2019-09-10 ENCOUNTER — OUTPATIENT (OUTPATIENT)
Dept: OUTPATIENT SERVICES | Facility: HOSPITAL | Age: 59
LOS: 1 days | Discharge: HOME | End: 2019-09-10

## 2019-09-10 DIAGNOSIS — C43.39 MALIGNANT MELANOMA OF OTHER PARTS OF FACE: ICD-10-CM

## 2019-09-12 ENCOUNTER — LABORATORY RESULT (OUTPATIENT)
Age: 59
End: 2019-09-12

## 2019-09-12 ENCOUNTER — APPOINTMENT (OUTPATIENT)
Dept: INFUSION THERAPY | Facility: HOSPITAL | Age: 59
End: 2019-09-12

## 2019-09-12 ENCOUNTER — RESULT REVIEW (OUTPATIENT)
Age: 59
End: 2019-09-12

## 2019-09-12 LAB
BASOPHILS # BLD AUTO: 0 K/UL — SIGNIFICANT CHANGE UP (ref 0–0.2)
BASOPHILS NFR BLD AUTO: 0.1 % — SIGNIFICANT CHANGE UP (ref 0–2)
EOSINOPHIL # BLD AUTO: 0.1 K/UL — SIGNIFICANT CHANGE UP (ref 0–0.5)
EOSINOPHIL NFR BLD AUTO: 1.6 % — SIGNIFICANT CHANGE UP (ref 0–6)
HCT VFR BLD CALC: 42.6 % — SIGNIFICANT CHANGE UP (ref 39–50)
HGB BLD-MCNC: 14.8 G/DL — SIGNIFICANT CHANGE UP (ref 13–17)
LYMPHOCYTES # BLD AUTO: 2.6 K/UL — SIGNIFICANT CHANGE UP (ref 1–3.3)
LYMPHOCYTES # BLD AUTO: 39.2 % — SIGNIFICANT CHANGE UP (ref 13–44)
MCHC RBC-ENTMCNC: 32.1 PG — SIGNIFICANT CHANGE UP (ref 27–34)
MCHC RBC-ENTMCNC: 34.7 G/DL — SIGNIFICANT CHANGE UP (ref 32–36)
MCV RBC AUTO: 92.4 FL — SIGNIFICANT CHANGE UP (ref 80–100)
MONOCYTES # BLD AUTO: 0.6 K/UL — SIGNIFICANT CHANGE UP (ref 0–0.9)
MONOCYTES NFR BLD AUTO: 8.9 % — SIGNIFICANT CHANGE UP (ref 2–14)
NEUTROPHILS # BLD AUTO: 3.4 K/UL — SIGNIFICANT CHANGE UP (ref 1.8–7.4)
NEUTROPHILS NFR BLD AUTO: 50.2 % — SIGNIFICANT CHANGE UP (ref 43–77)
PLATELET # BLD AUTO: 202 K/UL — SIGNIFICANT CHANGE UP (ref 150–400)
RBC # BLD: 4.62 M/UL — SIGNIFICANT CHANGE UP (ref 4.2–5.8)
RBC # FLD: 11.8 % — SIGNIFICANT CHANGE UP (ref 10.3–14.5)
WBC # BLD: 6.7 K/UL — SIGNIFICANT CHANGE UP (ref 3.8–10.5)
WBC # FLD AUTO: 6.7 K/UL — SIGNIFICANT CHANGE UP (ref 3.8–10.5)

## 2019-09-13 DIAGNOSIS — Z51.11 ENCOUNTER FOR ANTINEOPLASTIC CHEMOTHERAPY: ICD-10-CM

## 2019-09-26 ENCOUNTER — APPOINTMENT (OUTPATIENT)
Dept: DERMATOLOGY | Facility: CLINIC | Age: 59
End: 2019-09-26

## 2019-09-26 ENCOUNTER — APPOINTMENT (OUTPATIENT)
Dept: HEMATOLOGY ONCOLOGY | Facility: CLINIC | Age: 59
End: 2019-09-26
Payer: MEDICAID

## 2019-09-26 VITALS
RESPIRATION RATE: 16 BRPM | TEMPERATURE: 98.3 F | WEIGHT: 220.62 LBS | OXYGEN SATURATION: 98 % | DIASTOLIC BLOOD PRESSURE: 90 MMHG | SYSTOLIC BLOOD PRESSURE: 160 MMHG | BODY MASS INDEX: 29.11 KG/M2

## 2019-09-26 PROCEDURE — 99214 OFFICE O/P EST MOD 30 MIN: CPT

## 2019-09-26 NOTE — ASSESSMENT
[Supportive] : Goals of care discussed with patient: Supportive [Palliative Care Plan] : not applicable at this time [FreeTextEntry1] : Mr Montoya has completed a year of therapy with ipilimumab nivolumab; \par he looks well. The physical examination is normal. \par Discussion of the elevation of the cortisol level. Patient will remain on hydrocortisone therapy due to immune effects ion the adrenal gland production of steroid,He will have additional imaging requested to tack the pulmonary lesion. Renewal of medication including Losartan and hydrocortisone.Education provided. \par Consideration of the resection of the pulmonary lesion. He does not want surgery at this time.  \par Scans to be obtained November 7 2019\par RTC November 14 2019

## 2019-09-26 NOTE — HISTORY OF PRESENT ILLNESS
[Disease: _____________________] : Disease: [unfilled] [T: ___] : T[unfilled] [M: ___] : M[unfilled] [N: ___] : N[unfilled] [AJCC Stage: ____] : AJCC Stage: [unfilled] [Treatment Protocol] : Treatment Protocol [0 - No Distress] : Distress Level: 0 [Date: ____________] : Patient's last distress assessment performed on [unfilled]. [de-identified] : This 59 year old male describes a mas in the right lower jaw. The patient has a history of dental discomfort and he had a 3rd molar resection (he describes it as a wisdom tooth in the last week of June 2018. Approximately 4 weeks later he noted persistent swelling in the area and he was given a course of antibiotic treatment beneath the right jaw extending to the right buccal area in the last week of July 2018. After one week there was no definite shrinkage, and he went to an urgent care center; a CXR was requested and the right jaw mass was noted; he was referred to [facility] ER  and  CT scan was requested of the chest and ST of the neck and jaw.\par The  CT of the chest showed a  2.5 cm mass of the left lingula .He was referred for follow up by an oral surgeon Dr Jimenez in [facility] who did an incision and drainage and biopsies of the  mandible on August 18 2018. Finding revealed malignant melanoma as Dr Jimenez had sent the slide to  ERIKA GARCIA  for review by Dr Snyder\par He was seen by a primary care physician KRISHAN Peralta and a pulmonologist Tana Scott.\par A CT/PET performed on September 3 2018; the scan was described as showing a FG G avid mass in the right mandible with an S U V of  and an abnormality in the left  lung lingula that measures 2.0 X 3.1 cm and is  F D G avid S U V 15.5 ; there is a second left upper lobe nodule that is small and it was  non FD G avid; he has a right leg intramuscular lesion with low F D G  uptake. Intramuscular lesions and chest wall lesions are thought to be neurofibroma and are not F D G avid. He has a right renal cyst. \par The patient has no history of skin malignant melanoma. He has had a recent assessment  by dermatology of lesions of the right arm and left bridge of nose. No diagnosis of malignancy was made.\par There is a past medical history of resection of neurofibromas; He dose not know of neurofibromas in his past family history including his natural mother and his natural father; he is adopted.  [de-identified] : metastatic melanoma [de-identified] : tumor cells positive for S 100, Melan A ,SOX 10, HMG 45.\par negative for T T F 1, A E 1.3.,p 40\par positive c kit [FreeTextEntry1] : nivolumab 480 mg IV monthly (twelfth month of therapy; s/p combination ipilimumab- nivolumab followed by 9 months single agent nivolumab)  [de-identified] : My last treatment was on 09/12/2019. I feel OK.No fatigue. he is working. No nausea and no vomiting. No skin rash and no new lesions

## 2019-11-07 ENCOUNTER — FORM ENCOUNTER (OUTPATIENT)
Age: 59
End: 2019-11-07

## 2019-11-08 ENCOUNTER — APPOINTMENT (OUTPATIENT)
Dept: CT IMAGING | Facility: IMAGING CENTER | Age: 59
End: 2019-11-08
Payer: MEDICAID

## 2019-11-08 ENCOUNTER — OUTPATIENT (OUTPATIENT)
Dept: OUTPATIENT SERVICES | Facility: HOSPITAL | Age: 59
LOS: 1 days | End: 2019-11-08
Payer: MEDICAID

## 2019-11-08 DIAGNOSIS — C43.30 MALIGNANT MELANOMA OF UNSPECIFIED PART OF FACE: ICD-10-CM

## 2019-11-08 PROCEDURE — 70487 CT MAXILLOFACIAL W/DYE: CPT | Mod: 26

## 2019-11-08 PROCEDURE — 74177 CT ABD & PELVIS W/CONTRAST: CPT | Mod: 26

## 2019-11-08 PROCEDURE — 70491 CT SOFT TISSUE NECK W/DYE: CPT | Mod: 26

## 2019-11-08 PROCEDURE — 71260 CT THORAX DX C+: CPT | Mod: 26

## 2019-11-08 PROCEDURE — 82565 ASSAY OF CREATININE: CPT

## 2019-11-08 PROCEDURE — 70491 CT SOFT TISSUE NECK W/DYE: CPT

## 2019-11-08 PROCEDURE — 71260 CT THORAX DX C+: CPT

## 2019-11-08 PROCEDURE — 74177 CT ABD & PELVIS W/CONTRAST: CPT

## 2019-11-08 PROCEDURE — 70487 CT MAXILLOFACIAL W/DYE: CPT

## 2019-11-11 ENCOUNTER — OUTPATIENT (OUTPATIENT)
Dept: OUTPATIENT SERVICES | Facility: HOSPITAL | Age: 59
LOS: 1 days | Discharge: HOME | End: 2019-11-11

## 2019-11-11 DIAGNOSIS — C43.39 MALIGNANT MELANOMA OF OTHER PARTS OF FACE: ICD-10-CM

## 2019-11-14 ENCOUNTER — RESULT REVIEW (OUTPATIENT)
Age: 59
End: 2019-11-14

## 2019-11-14 ENCOUNTER — APPOINTMENT (OUTPATIENT)
Dept: HEMATOLOGY ONCOLOGY | Facility: CLINIC | Age: 59
End: 2019-11-14
Payer: MEDICAID

## 2019-11-14 VITALS
SYSTOLIC BLOOD PRESSURE: 167 MMHG | TEMPERATURE: 98.6 F | OXYGEN SATURATION: 97 % | BODY MASS INDEX: 29.52 KG/M2 | WEIGHT: 223.77 LBS | RESPIRATION RATE: 18 BRPM | DIASTOLIC BLOOD PRESSURE: 103 MMHG | HEART RATE: 100 BPM

## 2019-11-14 LAB
ALBUMIN SERPL ELPH-MCNC: 4.2 G/DL
ALP BLD-CCNC: 57 U/L
ALT SERPL-CCNC: 11 U/L
ANION GAP SERPL CALC-SCNC: 13 MMOL/L
AST SERPL-CCNC: 18 U/L
BILIRUB SERPL-MCNC: 0.5 MG/DL
BUN SERPL-MCNC: 19 MG/DL
CALCIUM SERPL-MCNC: 9.2 MG/DL
CHLORIDE SERPL-SCNC: 102 MMOL/L
CO2 SERPL-SCNC: 26 MMOL/L
CREAT SERPL-MCNC: 1.16 MG/DL
GLUCOSE SERPL-MCNC: 104 MG/DL
HCT VFR BLD CALC: 40.5 % — SIGNIFICANT CHANGE UP (ref 39–50)
HGB BLD-MCNC: 14.9 G/DL — SIGNIFICANT CHANGE UP (ref 13–17)
MCHC RBC-ENTMCNC: 34 PG — SIGNIFICANT CHANGE UP (ref 27–34)
MCHC RBC-ENTMCNC: 36.7 G/DL — HIGH (ref 32–36)
MCV RBC AUTO: 92.8 FL — SIGNIFICANT CHANGE UP (ref 80–100)
PLATELET # BLD AUTO: 188 K/UL — SIGNIFICANT CHANGE UP (ref 150–400)
POTASSIUM SERPL-SCNC: 4.2 MMOL/L
PROT SERPL-MCNC: 6.6 G/DL
RBC # BLD: 4.37 M/UL — SIGNIFICANT CHANGE UP (ref 4.2–5.8)
RBC # FLD: 11.5 % — SIGNIFICANT CHANGE UP (ref 10.3–14.5)
SODIUM SERPL-SCNC: 141 MMOL/L
T3FREE SERPL-MCNC: 3.59 PG/ML
T4 FREE SERPL-MCNC: 0.9 NG/DL
TSH SERPL-ACNC: 4.73 UIU/ML
WBC # BLD: 7.2 K/UL — SIGNIFICANT CHANGE UP (ref 3.8–10.5)
WBC # FLD AUTO: 7.2 K/UL — SIGNIFICANT CHANGE UP (ref 3.8–10.5)

## 2019-11-14 PROCEDURE — 99214 OFFICE O/P EST MOD 30 MIN: CPT

## 2019-11-16 NOTE — ASSESSMENT
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 He appeared well today and his physical examination findings were unremarkable. His CT scans from 11/8/2019 demonstrated a favorable response and stability of disease. He is advised to remain on amlodipine (hypertension) and hydrocortisone (immunotherapy induced cortisol deficiency) at this time. He will remain under surveillance; follow-up with dermatology as directed. Return to the office in 3 months.

## 2019-11-16 NOTE — HISTORY OF PRESENT ILLNESS
[Disease: _____________________] : Disease: [unfilled] [T: ___] : T[unfilled] [N: ___] : N[unfilled] [M: ___] : M[unfilled] [AJCC Stage: ____] : AJCC Stage: [unfilled] [Treatment Protocol] : Treatment Protocol [de-identified] : 59 year old male presenting to the office for oncologic care. He initially noted the development of a mass in the right lower jaw. The patient has a history of dental discomfort and he had a 3rd molar resection; he describes it as a wisdom tooth in the last week of June 2018. Approximately 4 weeks later he noted persistent swelling in the area and he was given a course of antibiotic treatment beneath the right jaw extending to the right buccal area in the last week of July 2018. After one week there was no definite shrinkage, and he went to an urgent care center; a CXR was requested and the right jaw mass was noted; he was referred to [facility] ER  and  CT scan was requested of the chest and ST of the neck and jaw.\par The  CT of the chest showed a  2.5 cm mass of the left lingula .He was referred for follow up by an oral surgeon Dr Jimenez in [facility] who did an incision and drainage and biopsies of the  mandible on August 18 2018. Finding revealed malignant melanoma as Dr Jimenez had sent the slide to  ERIKA GARCIA  for review by Dr Snyder\par He was seen by a primary care physician KRISHAN Peralta and a pulmonologist Tana Scott.\par A CT/PET performed on September 3 2018; the scan was described as showing a FG G avid mass in the right mandible with an S U V of  and an abnormality in the left  lung lingula that measures 2.0 X 3.1 cm and is  F D G avid S U V 15.5 ; there is a second left upper lobe nodule that is small and it was  non FD G avid; he has a right leg intramuscular lesion with low F D G  uptake. Intramuscular lesions and chest wall lesions are thought to be neurofibroma and are not F D G avid. He has a right renal cyst. \par The patient has no history of skin malignant melanoma. He has had a recent assessment  by dermatology of lesions of the right arm and left bridge of nose. No diagnosis of malignancy was made.\par There is a past medical history of resection of neurofibromas; He dose not know of neurofibromas in his past family history including his natural mother and his natural father; he is adopted.  [de-identified] : metastatic melanoma [de-identified] : tumor cells positive for S 100, Melan A ,SOX 10, HMG 45.\par negative for T T F 1, A E 1.3.,p 40\par positive c kit [FreeTextEntry1] : s/p 4 cycles of combination ipilimumab + nivolumab, followed by 9 months single agent nivolumab)  [de-identified] : Patient presented to the office to discuss his imaging study results from earlier this month. He currently feels fine.

## 2019-11-18 ENCOUNTER — APPOINTMENT (OUTPATIENT)
Dept: OTOLARYNGOLOGY | Facility: CLINIC | Age: 59
End: 2019-11-18
Payer: MEDICAID

## 2019-11-18 VITALS
SYSTOLIC BLOOD PRESSURE: 162 MMHG | OXYGEN SATURATION: 95 % | WEIGHT: 216 LBS | HEIGHT: 72 IN | HEART RATE: 97 BPM | DIASTOLIC BLOOD PRESSURE: 93 MMHG | BODY MASS INDEX: 29.26 KG/M2

## 2019-11-18 PROCEDURE — 99213 OFFICE O/P EST LOW 20 MIN: CPT

## 2019-11-19 NOTE — PHYSICAL EXAM
[Normal] : orientation to person, place, and time: normal [FreeTextEntry1] : R mandible contour back to normal, no expansion or masses noted [de-identified] : no evidence of mucosal pigmentation or melanoma

## 2019-11-19 NOTE — DATA REVIEWED
[de-identified] : interval treatment records [de-identified] : 11/2019 CT chest/ab/pelvis, neck, maxillofacial

## 2019-11-19 NOTE — HISTORY OF PRESENT ILLNESS
[de-identified] : 60 yo M with right mandibular metastatic melanoma s/p 4 cycles of nivolumab and ipilimumab now on 9 months of nivolumab with good response clinically and radiologically. Pt had no severe side effects from the medications, is doing well, eating ok and maintaining weight. No F/C, no dysphagia, no significant pain.\par \par 11/8/19 CT chest/ab/pelvis stable.\par 11/8/19 CT neck/max: mild interval decrease in size of lytic destructive right mandible mass with more mineralization of bone, consistent with favorable response, LNs grossly stable.

## 2019-11-20 LAB
CORTICOSTEROID BIND GLOBULIN: 1.9 MG/DL
CORTIS SERPL-MCNC: 13 UG/DL
CORTISOL, FREE: 2 UG/DL
PFCX: 15 %

## 2019-12-19 ENCOUNTER — APPOINTMENT (OUTPATIENT)
Dept: DERMATOLOGY | Facility: CLINIC | Age: 59
End: 2019-12-19
Payer: MEDICAID

## 2019-12-19 DIAGNOSIS — L98.9 DISORDER OF THE SKIN AND SUBCUTANEOUS TISSUE, UNSPECIFIED: ICD-10-CM

## 2019-12-19 PROCEDURE — 99214 OFFICE O/P EST MOD 30 MIN: CPT

## 2020-02-11 ENCOUNTER — OUTPATIENT (OUTPATIENT)
Dept: OUTPATIENT SERVICES | Facility: HOSPITAL | Age: 60
LOS: 1 days | Discharge: HOME | End: 2020-02-11

## 2020-02-11 DIAGNOSIS — C43.39 MALIGNANT MELANOMA OF OTHER PARTS OF FACE: ICD-10-CM

## 2020-02-12 ENCOUNTER — RESULT REVIEW (OUTPATIENT)
Age: 60
End: 2020-02-12

## 2020-02-12 ENCOUNTER — APPOINTMENT (OUTPATIENT)
Dept: HEMATOLOGY ONCOLOGY | Facility: CLINIC | Age: 60
End: 2020-02-12
Payer: MEDICAID

## 2020-02-12 VITALS
BODY MASS INDEX: 30.5 KG/M2 | DIASTOLIC BLOOD PRESSURE: 92 MMHG | TEMPERATURE: 98.9 F | WEIGHT: 224.87 LBS | RESPIRATION RATE: 18 BRPM | SYSTOLIC BLOOD PRESSURE: 157 MMHG | HEART RATE: 97 BPM | OXYGEN SATURATION: 98 %

## 2020-02-12 LAB
HCT VFR BLD CALC: 42.3 % — SIGNIFICANT CHANGE UP (ref 39–50)
HGB BLD-MCNC: 14.7 G/DL — SIGNIFICANT CHANGE UP (ref 13–17)
MCHC RBC-ENTMCNC: 32.2 PG — SIGNIFICANT CHANGE UP (ref 27–34)
MCHC RBC-ENTMCNC: 34.8 G/DL — SIGNIFICANT CHANGE UP (ref 32–36)
MCV RBC AUTO: 92.7 FL — SIGNIFICANT CHANGE UP (ref 80–100)
PLATELET # BLD AUTO: 202 K/UL — SIGNIFICANT CHANGE UP (ref 150–400)
RBC # BLD: 4.56 M/UL — SIGNIFICANT CHANGE UP (ref 4.2–5.8)
RBC # FLD: 11.7 % — SIGNIFICANT CHANGE UP (ref 10.3–14.5)
T3FREE SERPL-MCNC: 3.68 PG/ML
T4 FREE SERPL-MCNC: 1.1 NG/DL
TSH SERPL-ACNC: 2.58 UIU/ML
WBC # BLD: 7.1 K/UL — SIGNIFICANT CHANGE UP (ref 3.8–10.5)
WBC # FLD AUTO: 7.1 K/UL — SIGNIFICANT CHANGE UP (ref 3.8–10.5)

## 2020-02-12 PROCEDURE — 99215 OFFICE O/P EST HI 40 MIN: CPT

## 2020-02-12 RX ORDER — BETAMETHASONE DIPROPIONATE 0.5 MG/G
0.05 CREAM TOPICAL
Qty: 1 | Refills: 0 | Status: DISCONTINUED | COMMUNITY
Start: 2019-12-19 | End: 2020-02-12

## 2020-02-12 RX ORDER — MUPIROCIN 20 MG/G
2 OINTMENT TOPICAL
Qty: 1 | Refills: 0 | Status: DISCONTINUED | COMMUNITY
Start: 2019-08-28 | End: 2020-02-12

## 2020-02-13 LAB
ALBUMIN SERPL ELPH-MCNC: 4.3 G/DL
ALP BLD-CCNC: 60 U/L
ALT SERPL-CCNC: 15 U/L
ANION GAP SERPL CALC-SCNC: 11 MMOL/L
AST SERPL-CCNC: 20 U/L
BILIRUB SERPL-MCNC: 0.3 MG/DL
BUN SERPL-MCNC: 18 MG/DL
CALCIUM SERPL-MCNC: 8.9 MG/DL
CHLORIDE SERPL-SCNC: 105 MMOL/L
CO2 SERPL-SCNC: 26 MMOL/L
CREAT SERPL-MCNC: 1.17 MG/DL
GLUCOSE SERPL-MCNC: 96 MG/DL
POTASSIUM SERPL-SCNC: 4.4 MMOL/L
PROT SERPL-MCNC: 6.8 G/DL
SODIUM SERPL-SCNC: 142 MMOL/L

## 2020-02-18 LAB
CORTICOSTEROID BIND GLOBULIN: 2 MG/DL
CORTIS SERPL-MCNC: 1.9 UG/DL
CORTISOL, FREE: 0.06 UG/DL
PFCX: 2.9 %

## 2020-02-18 NOTE — REASON FOR VISIT
[Follow-Up Visit] : a follow-up [FreeTextEntry2] : I am here for the follow up of the melanoma affecting my jaw and my lung

## 2020-02-18 NOTE — ASSESSMENT
[Supportive] : Goals of care discussed with patient: Supportive [Palliative Care Plan] : not applicable at this time [FreeTextEntry1] : Wali Montoya is here for evaluation of the melanoma now approximately 15 months form diagnosis and treatment. He has no oral lesions and he has no pulmonary symptoms attributable to recurrence. The patient remains employed and he feels well.\par No new skin lesions are noted. I will request a CT scan (now three months form stopping nivolumab therapy) of the face, brain (he refuses to have a MRI of head) thorax abdomen and pelvis in the second week of March 2020.\par Overall clinical condition is stable; except for the finding of persistence of the hypertension. He is avoiding salt and he is on Losartan. He does not have a primary care MD and I will prescribe amlodipine 5 mg PO dial to be taken in addition. Use and side effects of amlodipine and the treatment of hypertension and melanoma education provided. Patient is agreeable to treatment. \par RTC June 2020

## 2020-02-18 NOTE — REVIEW OF SYSTEMS
[Negative] : Allergic/Immunologic [Chest Pain] : no chest pain [Palpitations] : no palpitations [Leg Claudication] : no intermittent leg claudication [Lower Ext Edema] : no lower extremity edema [FreeTextEntry2] : i lbs weight gain [FreeTextEntry5] : hypertension

## 2020-02-18 NOTE — HISTORY OF PRESENT ILLNESS
[Disease: _____________________] : Disease: [unfilled] [T: ___] : T[unfilled] [N: ___] : N[unfilled] [M: ___] : M[unfilled] [AJCC Stage: ____] : AJCC Stage: [unfilled] [Treatment Protocol] : Treatment Protocol [Date: ____________] : Patient's last distress assessment performed on [unfilled]. [0 - No Distress] : Distress Level: 0 [de-identified] : This 59 year old male describes a mas in the right lower jaw. The patient has a history of dental discomfort and he had a 3rd molar resection (he describes it as a wisdom tooth in the last week of June 2018. Approximately 4 weeks later he noted persistent swelling in the area and he was given a course of antibiotic treatment beneath the right jaw extending to the right buccal area in the last week of July 2018. After one week there was no definite shrinkage, and he went to an urgent care center; a CXR was requested and the right jaw mass was noted; he was referred to [facility] ER  and  CT scan was requested of the chest and ST of the neck and jaw.\par The  CT of the chest showed a  2.5 cm mass of the left lingula .He was referred for follow up by an oral surgeon Dr Jimenez in [facility] who did an incision and drainage and biopsies of the  mandible on August 18 2018. Finding revealed malignant melanoma as Dr Jimenez had sent the slide to  [facility]  for review by Dr Snyder\par He was seen by a primary care physician KRISHAN Peralta and a pulmonologist Tana Scott.\par A CT/PET performed on September 3 2018; the scan was described as showing a FG G avid mass in the right mandible with an S U V of  and an abnormality in the left  lung lingula that measures 2.0 X 3.1 cm and is  F D G avid S U V 15.5 ; there is a second left upper lobe nodule that is small and it was  non FD G avid; he has a right leg intramuscular lesion with low F D G  uptake. Intramuscular lesions and chest wall lesions are thought to be neurofibroma and are not F D G avid. He has a right renal cyst. \par The patient has no history of skin malignant melanoma. He has had a recent assessment  by dermatology of lesions of the right arm and left bridge of nose. No diagnosis of malignancy was made.\par There is a past medical history of resection of neurofibromas; He dose not know of neurofibromas in his past family history including his natural mother and his natural father; he is adopted.  [de-identified] : metastatic melanoma [de-identified] : tumor cells positive for S 100, Melan A ,SOX 10, HMG 45.\par negative for T T F 1, A E 1.3.,p 40\par positive c kit [FreeTextEntry1] : nivolumab 480 mg IV monthly (twelfth month of therapy; s/p combination ipilimumab- nivolumab followed by 9 months single agent nivolumab) last treatment September 2019 [de-identified] : The patient has been feeling without"... no real difference..." by that he means that he feels well. No hospitalzation. No chest beto. No cough and no production of sputum.\par Stuffy nose. No weight loss; he gained weight now at 224.

## 2020-03-04 ENCOUNTER — APPOINTMENT (OUTPATIENT)
Dept: DERMATOLOGY | Facility: CLINIC | Age: 60
End: 2020-03-04
Payer: COMMERCIAL

## 2020-03-04 PROCEDURE — 99214 OFFICE O/P EST MOD 30 MIN: CPT

## 2020-03-11 ENCOUNTER — FORM ENCOUNTER (OUTPATIENT)
Age: 60
End: 2020-03-11

## 2020-03-12 ENCOUNTER — APPOINTMENT (OUTPATIENT)
Dept: CT IMAGING | Facility: IMAGING CENTER | Age: 60
End: 2020-03-12
Payer: COMMERCIAL

## 2020-03-12 ENCOUNTER — OUTPATIENT (OUTPATIENT)
Dept: OUTPATIENT SERVICES | Facility: HOSPITAL | Age: 60
LOS: 1 days | End: 2020-03-12
Payer: COMMERCIAL

## 2020-03-12 DIAGNOSIS — R91.1 SOLITARY PULMONARY NODULE: ICD-10-CM

## 2020-03-12 DIAGNOSIS — C43.30 MALIGNANT MELANOMA OF UNSPECIFIED PART OF FACE: ICD-10-CM

## 2020-03-12 PROCEDURE — 70460 CT HEAD/BRAIN W/DYE: CPT

## 2020-03-12 PROCEDURE — 71260 CT THORAX DX C+: CPT | Mod: 26

## 2020-03-12 PROCEDURE — 70460 CT HEAD/BRAIN W/DYE: CPT | Mod: 26

## 2020-03-12 PROCEDURE — 70487 CT MAXILLOFACIAL W/DYE: CPT | Mod: 26

## 2020-03-12 PROCEDURE — 70487 CT MAXILLOFACIAL W/DYE: CPT

## 2020-03-12 PROCEDURE — 71260 CT THORAX DX C+: CPT

## 2020-03-13 ENCOUNTER — OUTPATIENT (OUTPATIENT)
Dept: OUTPATIENT SERVICES | Facility: HOSPITAL | Age: 60
LOS: 1 days | Discharge: HOME | End: 2020-03-13

## 2020-03-13 DIAGNOSIS — C43.39 MALIGNANT MELANOMA OF OTHER PARTS OF FACE: ICD-10-CM

## 2020-03-18 ENCOUNTER — APPOINTMENT (OUTPATIENT)
Dept: HEMATOLOGY ONCOLOGY | Facility: CLINIC | Age: 60
End: 2020-03-18

## 2020-04-30 ENCOUNTER — OUTPATIENT (OUTPATIENT)
Dept: OUTPATIENT SERVICES | Facility: HOSPITAL | Age: 60
LOS: 1 days | Discharge: HOME | End: 2020-04-30

## 2020-04-30 DIAGNOSIS — C43.39 MALIGNANT MELANOMA OF OTHER PARTS OF FACE: ICD-10-CM

## 2020-05-06 ENCOUNTER — APPOINTMENT (OUTPATIENT)
Dept: HEMATOLOGY ONCOLOGY | Facility: CLINIC | Age: 60
End: 2020-05-06
Payer: COMMERCIAL

## 2020-05-06 VITALS — RESPIRATION RATE: 16 BRPM

## 2020-05-06 DIAGNOSIS — K13.70 UNSPECIFIED LESIONS OF ORAL MUCOSA: ICD-10-CM

## 2020-05-06 PROCEDURE — 99215 OFFICE O/P EST HI 40 MIN: CPT | Mod: 95

## 2020-05-08 NOTE — REVIEW OF SYSTEMS
[Fever] : no fever [Night Sweats] : no night sweats [Chills] : no chills [Recent Change In Weight] : ~T no recent weight change [Eye Pain] : no eye pain [Red Eyes] : eyes not red [Vision Problems] : no vision problems [Dry Eyes] : no dryness of the eyes [Dysphagia] : no dysphagia [Nosebleeds] : no nosebleeds [Loss of Hearing] : no loss of hearing [Mucosal Pain] : no mucosal pain [Odynophagia] : no odynophagia [Hoarseness] : no hoarseness [Chest Pain] : no chest pain [Leg Claudication] : no intermittent leg claudication [Palpitations] : no palpitations [Lower Ext Edema] : no lower extremity edema [Shortness Of Breath] : no shortness of breath [Wheezing] : no wheezing [Cough] : no cough [SOB on Exertion] : no shortness of breath during exertion [Abdominal Pain] : no abdominal pain [Vomiting] : no vomiting [Constipation] : no constipation [Diarrhea] : no diarrhea [Dysuria] : no dysuria [Incontinence] : no incontinence [Joint Pain] : no joint pain [Joint Stiffness] : no joint stiffness [Muscle Pain] : no muscle pain [Skin Rash] : no skin rash [Skin Wound] : no skin wound [Confused] : no confusion [Fainting] : no fainting [Dizziness] : no dizziness [Difficulty Walking] : no difficulty walking [Suicidal] : not suicidal [Insomnia] : no insomnia [Proptosis] : no proptosis [Anxiety] : no anxiety [Depression] : no depression [Hot Flashes] : no hot flashes [Muscle Weakness] : no muscle weakness [Deepening Of The Voice] : no deepening of the voice [Easy Bleeding] : no tendency for easy bleeding [Easy Bruising] : no tendency for easy bruising [Swollen Glands] : no swollen glands

## 2020-05-08 NOTE — PHYSICAL EXAM
[Fully active, able to carry on all pre-disease performance without restriction] : Status 0 - Fully active, able to carry on all pre-disease performance without restriction [Normal] : affect appropriate [Ulcers] : no ulcers [Mucositis] : no mucositis [Thrush] : no thrush [Vesicles] : no vesicles [de-identified] : as seen [de-identified] : no jaundice EOM full conjugate [de-identified] : normal respiratory rate [de-identified] : as seen no cervical or supraclavicular lymph nodes [de-identified] : no cyanosis [de-identified] : no leg edema [de-identified] : as seen normal  [de-identified] : as seen normal [de-identified] : affect and mood are normal

## 2020-05-08 NOTE — HISTORY OF PRESENT ILLNESS
[Date: ____________] : Patient's last distress assessment performed on [unfilled]. [2 - Distress Level] : Distress Level: 2 [Home] : at home, [unfilled] , at the time of the visit. [Medical Office: ([facility])___] : at the medical office located in  [Disease: _____________________] : Disease: [unfilled] [T: ___] : T[unfilled] [N: ___] : N[unfilled] [M: ___] : M[unfilled] [AJCC Stage: ____] : AJCC Stage: [unfilled] [Treatment Protocol] : Treatment Protocol [de-identified] : tumor cells positive for S 100, Melan A ,SOX 10, HMG 45.\par negative for T T F 1, A E 1.3.,p 40\par positive c kit [de-identified] : metastatic melanoma [de-identified] : This 59 year old male describes a mas in the right lower jaw. The patient has a history of dental discomfort and he had a 3rd molar resection (he describes it as a wisdom tooth in the last week of June 2018. Approximately 4 weeks later he noted persistent swelling in the area and he was given a course of antibiotic treatment beneath the right jaw extending to the right buccal area in the last week of July 2018. After one week there was no definite shrinkage, and he went to an urgent care center; a CXR was requested and the right jaw mass was noted; he was referred to [facility] ER  and  CT scan was requested of the chest and ST of the neck and jaw.\par The  CT of the chest showed a  2.5 cm mass of the left lingula .He was referred for follow up by an oral surgeon Dr Jimenez in [facility] who did an incision and drainage and biopsies of the  mandible on August 18 2018. Finding revealed malignant melanoma as Dr Jimenez had sent the slide to  [facility]  for review by Dr Snyder\par He was seen by a primary care physician KRISHAN Peralta and a pulmonologist Tana Scott.\par A CT/PET performed on September 3 2018; the scan was described as showing a FG G avid mass in the right mandible with an S U V of  and an abnormality in the left  lung lingula that measures 2.0 X 3.1 cm and is  F D G avid S U V 15.5 ; there is a second left upper lobe nodule that is small and it was  non FD G avid; he has a right leg intramuscular lesion with low F D G  uptake. Intramuscular lesions and chest wall lesions are thought to be neurofibroma and are not F D G avid. He has a right renal cyst. \par The patient has no history of skin malignant melanoma. He has had a recent assessment  by dermatology of lesions of the right arm and left bridge of nose. No diagnosis of malignancy was made.\par There is a past medical history of resection of neurofibromas; He dose not know of neurofibromas in his past family history including his natural mother and his natural father; he is adopted.  [FreeTextEntry1] : nivolumab 480 mg IV monthly (twelfth month of therapy; s/p combination ipilimumab- nivolumab followed by 9 months single agent nivolumab) last treatment September 2019 [de-identified] : Sha has been remaining at home. he practices social distancing and he has used a face covering mask in the recent outbreak of the COVID 19 pandemic. There has been no fever no chills no night sweats and no myalgia. No cough.\par he has a lost a friend who may have  from COVID 19. Sha is not sure; patietn may have had a heart attack and may have have symptomatology of viral infection. Sha was in telephone contact only.\par No new jaw swelling and no oral discomfort. \par he has not noted new skin lesions either pigmented or not. No hemoptysis.\par He is furloughed from proof reading on 2020. he has been writing at home and that activity focuses his mind and calms anxiety.

## 2020-05-08 NOTE — RESULTS/DATA
[FreeTextEntry1] : I reviewed with Wali the March 10 CT scan results of chest head and facial structures including mandible and upper neck

## 2020-05-08 NOTE — ASSESSMENT
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 He has a normal physical examination as seen by video imaging and he remains with stable disease on the CT scans of the chest and face/brain. No signs of virus infection, he is practicing mitigation against infection with corona virus.\par I have asked Matt to visit with our office in two months for routine testing and endocrine regimen. he will continue on the hydrocortisone. he may be taking his amlodipine prior to sleep as he is concerned about weakness and fainting during the day with antihypertension medication

## 2020-07-01 ENCOUNTER — RX RENEWAL (OUTPATIENT)
Age: 60
End: 2020-07-01

## 2020-07-09 ENCOUNTER — APPOINTMENT (OUTPATIENT)
Dept: DERMATOLOGY | Facility: CLINIC | Age: 60
End: 2020-07-09
Payer: COMMERCIAL

## 2020-07-09 PROCEDURE — 99214 OFFICE O/P EST MOD 30 MIN: CPT

## 2020-07-09 NOTE — HISTORY OF PRESENT ILLNESS
[FreeTextEntry1] : fu metastatic melanoma  [de-identified] : 61 yo M here for above. Hx of metastatic melanoma August 2018 ckit+ of the R jaw and lung s/p ipilimumab and nivolumab therapy with favorable response - confirmed by recent CT scans March 2020. No night sweats or unintentional weight loss. Denies noticing any new or changing moles.

## 2020-07-09 NOTE — PHYSICAL EXAM
[Oriented x 3] : ~L oriented x 3 [Alert] : alert [Well Nourished] : well nourished [Conjunctiva Non-injected] : conjunctiva non-injected [No Clubbing] : no clubbing [No Visual Lymphadenopathy] : no visual  lymphadenopathy [No Edema] : no edema [No Bromhidrosis] : no bromhidrosis [No Chromhidrosis] : no chromhidrosis [Full Body Skin Exam Performed] : performed [FreeTextEntry3] : small vascular papules abdomen \par No LAD head, neck, axilla or groin \par maceration between the toes, thickening and discoloration of toenails\par Oral, genital and rectal exam done

## 2020-07-11 ENCOUNTER — OUTPATIENT (OUTPATIENT)
Dept: OUTPATIENT SERVICES | Facility: HOSPITAL | Age: 60
LOS: 1 days | Discharge: HOME | End: 2020-07-11

## 2020-07-11 DIAGNOSIS — C43.39 MALIGNANT MELANOMA OF OTHER PARTS OF FACE: ICD-10-CM

## 2020-07-15 ENCOUNTER — APPOINTMENT (OUTPATIENT)
Dept: HEMATOLOGY ONCOLOGY | Facility: CLINIC | Age: 60
End: 2020-07-15
Payer: COMMERCIAL

## 2020-07-15 VITALS
WEIGHT: 218 LBS | SYSTOLIC BLOOD PRESSURE: 130 MMHG | TEMPERATURE: 98 F | DIASTOLIC BLOOD PRESSURE: 80 MMHG | BODY MASS INDEX: 29.57 KG/M2

## 2020-07-15 PROCEDURE — 99215 OFFICE O/P EST HI 40 MIN: CPT | Mod: 95

## 2020-07-17 NOTE — HISTORY OF PRESENT ILLNESS
[N: ___] : N[unfilled] [T: ___] : T[unfilled] [Disease: _____________________] : Disease: [unfilled] [AJCC Stage: ____] : AJCC Stage: [unfilled] [M: ___] : M[unfilled] [Date: ____________] : Patient's last distress assessment performed on [unfilled]. [0 - No Distress] : Distress Level: 0 [Home] : at home, [unfilled] , at the time of the visit. [Medical Office: ([facility])___] : at the medical office located in  [Verbal consent obtained from patient] : the patient, [unfilled] [Treatment Protocol] : Treatment Protocol [de-identified] : This 60 year old male describes a mas in the right lower jaw. The patient has a history of dental discomfort and he had a 3rd molar resection (he describes it as a wisdom tooth in the last week of June 2018. Approximately 4 weeks later he noted persistent swelling in the area and he was given a course of antibiotic treatment beneath the right jaw extending to the right buccal area in the last week of July 2018. After one week there was no definite shrinkage, and he went to an urgent care center; a CXR was requested and the right jaw mass was noted; he was referred to [facility] ER  and  CT scan was requested of the chest and ST of the neck and jaw.\par The  CT of the chest showed a  2.5 cm mass of the left lingula .He was referred for follow up by an oral surgeon Dr Jimenez in [facility] who did an incision and drainage and biopsies of the  mandible on August 18 2018. Finding revealed malignant melanoma as Dr Jimenez had sent the slide to  [facility]  for review by Dr Snyder\par He was seen by a primary care physician KRISHAN Peralta and a pulmonologist Tana Scott.\par A CT/PET performed on September 3 2018; the scan was described as showing a FG G avid mass in the right mandible with an S U V of  and an abnormality in the left  lung lingula that measures 2.0 X 3.1 cm and is  F D G avid S U V 15.5 ; there is a second left upper lobe nodule that is small and it was  non FD G avid; he has a right leg intramuscular lesion with low F D G  uptake. Intramuscular lesions and chest wall lesions are thought to be neurofibroma and are not F D G avid. He has a right renal cyst. \par The patient has no history of skin malignant melanoma. He has had a recent assessment  by dermatology of lesions of the right arm and left bridge of nose. No diagnosis of malignancy was made.\par There is a past medical history of resection of neurofibromas; He dose not know of neurofibromas in his past family history including his natural mother and his natural father; he is adopted.  [de-identified] : metastatic melanoma [de-identified] : tumor cells positive for S 100, Melan A ,SOX 10, HMG 45.\par negative for T T F 1, A E 1.3.,p 40\par positive c kit [FreeTextEntry1] : nivolumab 480 mg IV monthly (twelfth month of therapy; s/p combination ipilimumab- nivolumab followed by 9 months single agent nivolumab) last treatment September 2019 [de-identified] : He visited Dr July Johnson

## 2020-07-17 NOTE — ASSESSMENT
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 No cough and no pulmonary symptoms were noted. he has no rash and no fatigue. The patient has not developed new symptoms in his follow up.\par No new health issues in his interval history. I would repeat CT scanning of the maxilla facial structures, neck,  chest, abdomen pelvis in October 2020; he will have repeat blood studies performed in the first week of October 2020.\par Patient is comfortable; with the plan of follow up.\par His medications for hypertension and treatment of low steroid levels were renewed.

## 2020-08-07 NOTE — ED PROVIDER NOTE - DISPOSITION TYPE
Patient's mother called and notified.  She was in agreement with an appt to be seen on Monday 8/10/20 at 1:30pm.  Appt scheduled.     DISCHARGE

## 2020-08-09 LAB
ACTH SER-ACNC: <1.5 PG/ML
ALBUMIN SERPL ELPH-MCNC: 4.5 G/DL
ALP BLD-CCNC: 57 U/L
ALT SERPL-CCNC: 18 U/L
ANION GAP SERPL CALC-SCNC: 13 MMOL/L
AST SERPL-CCNC: 25 U/L
BASOPHILS # BLD AUTO: 0.04 K/UL
BASOPHILS NFR BLD AUTO: 0.6 %
BILIRUB SERPL-MCNC: 0.3 MG/DL
BUN SERPL-MCNC: 14 MG/DL
CALCIUM SERPL-MCNC: 9.7 MG/DL
CHLORIDE SERPL-SCNC: 101 MMOL/L
CO2 SERPL-SCNC: 26 MMOL/L
CREAT SERPL-MCNC: 1.34 MG/DL
EOSINOPHIL # BLD AUTO: 0.06 K/UL
EOSINOPHIL NFR BLD AUTO: 0.9 %
FERRITIN SERPL-MCNC: 649 NG/ML
GLUCOSE SERPL-MCNC: 105 MG/DL
HCT VFR BLD CALC: 44.1 %
HGB BLD-MCNC: 14.7 G/DL
IMM GRANULOCYTES NFR BLD AUTO: 0.3 %
LDH SERPL-CCNC: 220 U/L
LYMPHOCYTES # BLD AUTO: 1.97 K/UL
LYMPHOCYTES NFR BLD AUTO: 30.8 %
MAN DIFF?: NORMAL
MCHC RBC-ENTMCNC: 31.3 PG
MCHC RBC-ENTMCNC: 33.3 GM/DL
MCV RBC AUTO: 94 FL
MONOCYTES # BLD AUTO: 0.75 K/UL
MONOCYTES NFR BLD AUTO: 11.7 %
NEUTROPHILS # BLD AUTO: 3.56 K/UL
NEUTROPHILS NFR BLD AUTO: 55.7 %
PLATELET # BLD AUTO: 268 K/UL
POTASSIUM SERPL-SCNC: 4.4 MMOL/L
PROT SERPL-MCNC: 7.2 G/DL
RBC # BLD: 4.69 M/UL
RBC # FLD: 13 %
SARS-COV-2 IGG SERPL IA-ACNC: 0.1 INDEX
SARS-COV-2 IGG SERPL QL IA: NEGATIVE
SODIUM SERPL-SCNC: 140 MMOL/L
T3FREE SERPL-MCNC: 3.27 PG/ML
T4 FREE SERPL-MCNC: 1.4 NG/DL
TSH SERPL-ACNC: 2.86 UIU/ML
WBC # FLD AUTO: 6.4 K/UL

## 2020-08-14 LAB
CORTICOSTEROID BIND GLOBULIN: 2.4 MG/DL
CORTIS SERPL-MCNC: 4.7 UG/DL
CORTISOL, FREE: 0.14 UG/DL
PFCX: 2.9 %

## 2020-10-13 ENCOUNTER — OUTPATIENT (OUTPATIENT)
Dept: OUTPATIENT SERVICES | Facility: HOSPITAL | Age: 60
LOS: 1 days | Discharge: HOME | End: 2020-10-13

## 2020-10-13 DIAGNOSIS — C43.39 MALIGNANT MELANOMA OF OTHER PARTS OF FACE: ICD-10-CM

## 2020-10-15 ENCOUNTER — RESULT REVIEW (OUTPATIENT)
Age: 60
End: 2020-10-15

## 2020-10-15 ENCOUNTER — LABORATORY RESULT (OUTPATIENT)
Age: 60
End: 2020-10-15

## 2020-10-15 ENCOUNTER — APPOINTMENT (OUTPATIENT)
Dept: CT IMAGING | Facility: IMAGING CENTER | Age: 60
End: 2020-10-15
Payer: COMMERCIAL

## 2020-10-15 ENCOUNTER — APPOINTMENT (OUTPATIENT)
Dept: HEMATOLOGY ONCOLOGY | Facility: CLINIC | Age: 60
End: 2020-10-15

## 2020-10-15 ENCOUNTER — OUTPATIENT (OUTPATIENT)
Dept: OUTPATIENT SERVICES | Facility: HOSPITAL | Age: 60
LOS: 1 days | End: 2020-10-15
Payer: COMMERCIAL

## 2020-10-15 DIAGNOSIS — R91.8 OTHER NONSPECIFIC ABNORMAL FINDING OF LUNG FIELD: ICD-10-CM

## 2020-10-15 DIAGNOSIS — C43.30 MALIGNANT MELANOMA OF UNSPECIFIED PART OF FACE: ICD-10-CM

## 2020-10-15 DIAGNOSIS — Z00.8 ENCOUNTER FOR OTHER GENERAL EXAMINATION: ICD-10-CM

## 2020-10-15 LAB
ALBUMIN SERPL ELPH-MCNC: 4.2 G/DL
ALP BLD-CCNC: 63 U/L
ALT SERPL-CCNC: 16 U/L
ANION GAP SERPL CALC-SCNC: 10 MMOL/L
AST SERPL-CCNC: 19 U/L
BASOPHILS # BLD AUTO: 0.06 K/UL — SIGNIFICANT CHANGE UP (ref 0–0.2)
BASOPHILS NFR BLD AUTO: 0.8 % — SIGNIFICANT CHANGE UP (ref 0–2)
BILIRUB SERPL-MCNC: 0.4 MG/DL
BUN SERPL-MCNC: 19 MG/DL
CALCIUM SERPL-MCNC: 9.2 MG/DL
CHLORIDE SERPL-SCNC: 106 MMOL/L
CO2 SERPL-SCNC: 27 MMOL/L
CORTIS SERPL-MCNC: 22.1 UG/DL
CREAT SERPL-MCNC: 1.13 MG/DL
EOSINOPHIL # BLD AUTO: 0.09 K/UL — SIGNIFICANT CHANGE UP (ref 0–0.5)
EOSINOPHIL NFR BLD AUTO: 1.1 % — SIGNIFICANT CHANGE UP (ref 0–6)
GLUCOSE SERPL-MCNC: 88 MG/DL
HCT VFR BLD CALC: 40.4 % — SIGNIFICANT CHANGE UP (ref 39–50)
HGB BLD-MCNC: 13.8 G/DL — SIGNIFICANT CHANGE UP (ref 13–17)
IMM GRANULOCYTES NFR BLD AUTO: 0.5 % — SIGNIFICANT CHANGE UP (ref 0–1.5)
LYMPHOCYTES # BLD AUTO: 1.95 K/UL — SIGNIFICANT CHANGE UP (ref 1–3.3)
LYMPHOCYTES # BLD AUTO: 24.6 % — SIGNIFICANT CHANGE UP (ref 13–44)
MCHC RBC-ENTMCNC: 31.5 PG — SIGNIFICANT CHANGE UP (ref 27–34)
MCHC RBC-ENTMCNC: 34.2 G/DL — SIGNIFICANT CHANGE UP (ref 32–36)
MCV RBC AUTO: 92.2 FL — SIGNIFICANT CHANGE UP (ref 80–100)
MONOCYTES # BLD AUTO: 0.66 K/UL — SIGNIFICANT CHANGE UP (ref 0–0.9)
MONOCYTES NFR BLD AUTO: 8.3 % — SIGNIFICANT CHANGE UP (ref 2–14)
NEUTROPHILS # BLD AUTO: 5.12 K/UL — SIGNIFICANT CHANGE UP (ref 1.8–7.4)
NEUTROPHILS NFR BLD AUTO: 64.7 % — SIGNIFICANT CHANGE UP (ref 43–77)
NRBC # BLD: 0 /100 WBCS — SIGNIFICANT CHANGE UP (ref 0–0)
PLATELET # BLD AUTO: 207 K/UL — SIGNIFICANT CHANGE UP (ref 150–400)
POTASSIUM SERPL-SCNC: 4 MMOL/L
PROT SERPL-MCNC: 6.8 G/DL
RBC # BLD: 4.38 M/UL — SIGNIFICANT CHANGE UP (ref 4.2–5.8)
RBC # FLD: 12.8 % — SIGNIFICANT CHANGE UP (ref 10.3–14.5)
SODIUM SERPL-SCNC: 142 MMOL/L
T3RU NFR SERPL: 1.1 TBI
T4 SERPL-MCNC: 6.6 UG/DL
WBC # BLD: 7.92 K/UL — SIGNIFICANT CHANGE UP (ref 3.8–10.5)
WBC # FLD AUTO: 7.92 K/UL — SIGNIFICANT CHANGE UP (ref 3.8–10.5)

## 2020-10-15 PROCEDURE — 70491 CT SOFT TISSUE NECK W/DYE: CPT | Mod: 26

## 2020-10-15 PROCEDURE — 70487 CT MAXILLOFACIAL W/DYE: CPT

## 2020-10-15 PROCEDURE — 71260 CT THORAX DX C+: CPT | Mod: 26

## 2020-10-15 PROCEDURE — 70491 CT SOFT TISSUE NECK W/DYE: CPT

## 2020-10-15 PROCEDURE — 70487 CT MAXILLOFACIAL W/DYE: CPT | Mod: 26

## 2020-10-15 PROCEDURE — 74177 CT ABD & PELVIS W/CONTRAST: CPT | Mod: 26

## 2020-10-15 PROCEDURE — 71260 CT THORAX DX C+: CPT

## 2020-10-15 PROCEDURE — 74177 CT ABD & PELVIS W/CONTRAST: CPT

## 2020-10-15 PROCEDURE — 82565 ASSAY OF CREATININE: CPT

## 2020-10-20 ENCOUNTER — APPOINTMENT (OUTPATIENT)
Dept: HEMATOLOGY ONCOLOGY | Facility: CLINIC | Age: 60
End: 2020-10-20
Payer: COMMERCIAL

## 2020-10-20 PROCEDURE — 99215 OFFICE O/P EST HI 40 MIN: CPT | Mod: 95

## 2020-10-22 NOTE — HISTORY OF PRESENT ILLNESS
[Home] : at home, [unfilled] , at the time of the visit. [Medical Office: ([facility])___] : at the medical office located in  [Disease: _____________________] : Disease: [unfilled] [T: ___] : T[unfilled] [N: ___] : N[unfilled] [M: ___] : M[unfilled] [AJCC Stage: ____] : AJCC Stage: [unfilled] [Treatment Protocol] : Treatment Protocol [Date: ____________] : Patient's last distress assessment performed on [unfilled]. [0 - No Distress] : Distress Level: 0 [de-identified] : This 60 year old male describes a mas in the right lower jaw. The patient has a history of dental discomfort and he had a 3rd molar resection (he describes it as a wisdom tooth in the last week of June 2018. Approximately 4 weeks later he noted persistent swelling in the area and he was given a course of antibiotic treatment beneath the right jaw extending to the right buccal area in the last week of July 2018. After one week there was no definite shrinkage, and he went to an urgent care center; a CXR was requested and the right jaw mass was noted; he was referred to [facility] ER  and  CT scan was requested of the chest and ST of the neck and jaw.\par The  CT of the chest showed a  2.5 cm mass of the left lingula .He was referred for follow up by an oral surgeon Dr Jimenez in [facility] who did an incision and drainage and biopsies of the  mandible on August 18 2018. Finding revealed malignant melanoma as Dr Jimenez had sent the slide to  [facility]  for review by Dr Snyder\par He was seen by a primary care physician KRISHAN Peralta and a pulmonologist Tana Scott.\par A CT/PET performed on September 3 2018; the scan was described as showing a FG G avid mass in the right mandible with an S U V of  and an abnormality in the left  lung lingula that measures 2.0 X 3.1 cm and is  F D G avid S U V 15.5 ; there is a second left upper lobe nodule that is small and it was  non FD G avid; he has a right leg intramuscular lesion with low F D G  uptake. Intramuscular lesions and chest wall lesions are thought to be neurofibroma and are not F D G avid. He has a right renal cyst. \par The patient has no history of skin malignant melanoma. He has had a recent assessment  by dermatology of lesions of the right arm and left bridge of nose. No diagnosis of malignancy was made.\par There is a past medical history of resection of neurofibromas; He dose not know of neurofibromas in his past family history including his natural mother and his natural father; he is adopted.  [de-identified] : metastatic melanoma [de-identified] : tumor cells positive for S 100, Melan A ,SOX 10, HMG 45.\par negative for T T F 1, A E 1.3.,p 40\par positive c kit [FreeTextEntry1] : nivolumab 480 mg IV monthly (twelfth month of therapy; s/p combination ipilimumab- nivolumab followed by 9 months single agent nivolumab) last treatment September 2019 [de-identified] : 27 months from diagnosis; he is 12 months from last immune therapy. No new symptoms of fatigue. cortisol level noted elevated

## 2020-10-22 NOTE — REVIEW OF SYSTEMS
[Fever] : fever [Negative] : Genitourinary [Dysphagia] : no dysphagia [Loss of Hearing] : no loss of hearing [Nosebleeds] : no nosebleeds [Hoarseness] : no hoarseness [Odynophagia] : no odynophagia [Mucosal Pain] : no mucosal pain [Chest Pain] : no chest pain [Palpitations] : no palpitations [Leg Claudication] : no intermittent leg claudication [Lower Ext Edema] : no lower extremity edema [Shortness Of Breath] : no shortness of breath [Wheezing] : no wheezing [Cough] : no cough [SOB on Exertion] : no shortness of breath during exertion [Abdominal Pain] : no abdominal pain [Vomiting] : no vomiting [Constipation] : no constipation [Skin Rash] : no skin rash [Skin Wound] : no skin wound [Confused] : no confusion [Dizziness] : no dizziness [Fainting] : no fainting [Difficulty Walking] : no difficulty walking [Suicidal] : not suicidal [Insomnia] : no insomnia [Anxiety] : no anxiety [Depression] : no depression [Proptosis] : no proptosis [Hot Flashes] : no hot flashes [Muscle Weakness] : no muscle weakness [Deepening Of The Voice] : no deepening of the voice [Easy Bleeding] : no tendency for easy bleeding [Easy Bruising] : no tendency for easy bruising [Swollen Glands] : no swollen glands [FreeTextEntry2] : one elevation 103 in August 2020 [de-identified] : exercise therapy

## 2020-10-22 NOTE — ASSESSMENT
[Palliative Care Plan] : not applicable at this time [FreeTextEntry1] : RODY Montoya is a 60 year old male who is ow two years status post immune treatment for metastatic malignant melanoma; the patient has been feeling well. On our call time this morning CT scanning results of neck chest and abdomen pelvis were not ready; I reviewed the images on the PACS viewer and no suspicious lesions were noted to enlarge. I called radiology and an official report was later issued. I called Wali to report the official radiaologic report of the image. No new growth of small pulmonary nodules and a flank mass (which may be a neurofibroma). Matt feels well and he will be seen in follow up in 3 months as a TEB

## 2020-12-31 ENCOUNTER — RX RENEWAL (OUTPATIENT)
Age: 60
End: 2020-12-31

## 2020-12-31 ENCOUNTER — APPOINTMENT (OUTPATIENT)
Dept: HEMATOLOGY ONCOLOGY | Facility: CLINIC | Age: 60
End: 2020-12-31
Payer: COMMERCIAL

## 2020-12-31 ENCOUNTER — NON-APPOINTMENT (OUTPATIENT)
Age: 60
End: 2020-12-31

## 2020-12-31 PROCEDURE — 99441: CPT

## 2021-01-03 ENCOUNTER — LABORATORY RESULT (OUTPATIENT)
Age: 61
End: 2021-01-03

## 2021-01-04 ENCOUNTER — APPOINTMENT (OUTPATIENT)
Dept: DERMATOLOGY | Facility: CLINIC | Age: 61
End: 2021-01-04
Payer: COMMERCIAL

## 2021-01-04 ENCOUNTER — LABORATORY RESULT (OUTPATIENT)
Age: 61
End: 2021-01-04

## 2021-01-04 DIAGNOSIS — R22.9 LOCALIZED SWELLING, MASS AND LUMP, UNSPECIFIED: ICD-10-CM

## 2021-01-04 PROCEDURE — 99214 OFFICE O/P EST MOD 30 MIN: CPT

## 2021-01-04 PROCEDURE — 99072 ADDL SUPL MATRL&STAF TM PHE: CPT

## 2021-01-09 ENCOUNTER — NON-APPOINTMENT (OUTPATIENT)
Age: 61
End: 2021-01-09

## 2021-01-20 ENCOUNTER — OUTPATIENT (OUTPATIENT)
Dept: OUTPATIENT SERVICES | Facility: HOSPITAL | Age: 61
LOS: 1 days | Discharge: HOME | End: 2021-01-20

## 2021-01-20 DIAGNOSIS — C43.39 MALIGNANT MELANOMA OF OTHER PARTS OF FACE: ICD-10-CM

## 2021-01-21 ENCOUNTER — RESULT REVIEW (OUTPATIENT)
Age: 61
End: 2021-01-21

## 2021-01-21 ENCOUNTER — APPOINTMENT (OUTPATIENT)
Dept: HEMATOLOGY ONCOLOGY | Facility: CLINIC | Age: 61
End: 2021-01-21

## 2021-01-21 ENCOUNTER — LABORATORY RESULT (OUTPATIENT)
Age: 61
End: 2021-01-21

## 2021-01-21 LAB
BASOPHILS # BLD AUTO: 0.05 K/UL — SIGNIFICANT CHANGE UP (ref 0–0.2)
BASOPHILS NFR BLD AUTO: 0.8 % — SIGNIFICANT CHANGE UP (ref 0–2)
EOSINOPHIL # BLD AUTO: 0.05 K/UL — SIGNIFICANT CHANGE UP (ref 0–0.5)
EOSINOPHIL NFR BLD AUTO: 0.8 % — SIGNIFICANT CHANGE UP (ref 0–6)
HCT VFR BLD CALC: 40.4 % — SIGNIFICANT CHANGE UP (ref 39–50)
HGB BLD-MCNC: 14.4 G/DL — SIGNIFICANT CHANGE UP (ref 13–17)
IMM GRANULOCYTES NFR BLD AUTO: 0.2 % — SIGNIFICANT CHANGE UP (ref 0–1.5)
LYMPHOCYTES # BLD AUTO: 2.11 K/UL — SIGNIFICANT CHANGE UP (ref 1–3.3)
LYMPHOCYTES # BLD AUTO: 35.2 % — SIGNIFICANT CHANGE UP (ref 13–44)
MCHC RBC-ENTMCNC: 31.4 PG — SIGNIFICANT CHANGE UP (ref 27–34)
MCHC RBC-ENTMCNC: 35.6 G/DL — SIGNIFICANT CHANGE UP (ref 32–36)
MCV RBC AUTO: 88.2 FL — SIGNIFICANT CHANGE UP (ref 80–100)
MONOCYTES # BLD AUTO: 0.51 K/UL — SIGNIFICANT CHANGE UP (ref 0–0.9)
MONOCYTES NFR BLD AUTO: 8.5 % — SIGNIFICANT CHANGE UP (ref 2–14)
NEUTROPHILS # BLD AUTO: 3.27 K/UL — SIGNIFICANT CHANGE UP (ref 1.8–7.4)
NEUTROPHILS NFR BLD AUTO: 54.5 % — SIGNIFICANT CHANGE UP (ref 43–77)
NRBC # BLD: 0 /100 WBCS — SIGNIFICANT CHANGE UP (ref 0–0)
PLATELET # BLD AUTO: 214 K/UL — SIGNIFICANT CHANGE UP (ref 150–400)
RBC # BLD: 4.58 M/UL — SIGNIFICANT CHANGE UP (ref 4.2–5.8)
RBC # FLD: 12.4 % — SIGNIFICANT CHANGE UP (ref 10.3–14.5)
WBC # BLD: 6 K/UL — SIGNIFICANT CHANGE UP (ref 3.8–10.5)
WBC # FLD AUTO: 6 K/UL — SIGNIFICANT CHANGE UP (ref 3.8–10.5)

## 2021-01-22 LAB
24R-OH-CALCIDIOL SERPL-MCNC: 35.3 PG/ML
ALBUMIN SERPL ELPH-MCNC: 4.4 G/DL
ALP BLD-CCNC: 62 U/L
ALT SERPL-CCNC: 14 U/L
ANION GAP SERPL CALC-SCNC: 17 MMOL/L
AST SERPL-CCNC: 20 U/L
BILIRUB SERPL-MCNC: 0.4 MG/DL
BUN SERPL-MCNC: 16 MG/DL
CALCIUM SERPL-MCNC: 9.3 MG/DL
CHLORIDE SERPL-SCNC: 104 MMOL/L
CO2 SERPL-SCNC: 20 MMOL/L
CREAT SERPL-MCNC: 1.18 MG/DL
GLUCOSE SERPL-MCNC: 99 MG/DL
POTASSIUM SERPL-SCNC: 4.3 MMOL/L
PROT SERPL-MCNC: 7.1 G/DL
SARS-COV-2 IGG SERPL IA-ACNC: 0.07 INDEX
SARS-COV-2 IGG SERPL QL IA: NEGATIVE
SODIUM SERPL-SCNC: 141 MMOL/L
T3RU NFR SERPL: 1.1 TBI
T4 SERPL-MCNC: 7.6 UG/DL

## 2021-01-27 ENCOUNTER — APPOINTMENT (OUTPATIENT)
Dept: HEMATOLOGY ONCOLOGY | Facility: CLINIC | Age: 61
End: 2021-01-27
Payer: COMMERCIAL

## 2021-01-27 ENCOUNTER — APPOINTMENT (OUTPATIENT)
Dept: HEMATOLOGY ONCOLOGY | Facility: CLINIC | Age: 61
End: 2021-01-27

## 2021-01-27 LAB
CORTICOSTEROID BIND GLOBULIN: 1.9 MG/DL
CORTIS SERPL-MCNC: 1.4 UG/DL
CORTISOL, FREE: 0.04 UG/DL
PFCX: 3 %

## 2021-01-27 PROCEDURE — 99215 OFFICE O/P EST HI 40 MIN: CPT | Mod: 95

## 2021-01-28 NOTE — HISTORY OF PRESENT ILLNESS
[Home] : at home, [unfilled] , at the time of the visit. [Medical Office: ([facility])___] : at the medical office located in  [Verbal consent obtained from patient] : the patient, [unfilled] [Disease: _____________________] : Disease: [unfilled] [T: ___] : T[unfilled] [N: ___] : N[unfilled] [M: ___] : M[unfilled] [AJCC Stage: ____] : AJCC Stage: [unfilled] [Treatment Protocol] : Treatment Protocol [de-identified] : 60 year old male presenting to the office for oncologic care. The patient has a history of dental discomfort and he had a 3rd molar resection; he describes it as a wisdom tooth in the last week of June 2018. Approximately 4 weeks later he noted persistent swelling in the area and he was given a course of antibiotic treatment beneath the right jaw extending to the right buccal area in the last week of July 2018. After one week there was no definite shrinkage, and he went to an urgent care center; a CXR was requested and the right jaw mass was noted. \par \par Patient was referred to [facility] ER where additional imaging studies were completed. CT chest showed a 2.5 cm mass of the left lingula .He was referred for follow up by an oral surgeon Dr Jimenez in [facility] who did an incision and drainage and biopsies of the mandible on 8/18/2018. Finding revealed malignant melanoma (Dr. Jimenez sent slide to  [facility]  for review by Dr Snyder). A CT/PET performed on 9/3/2018 demonstrated the following: a FDG avid mass in the right mandible, an FDG abnormality in the left lung lingula that measures 2.0 X 3.1 and second left upper lobe nodule that is small and it was non FD G avid. \par \par Patient completed 4 doses of Yervoy + Opdivo immunotherapy from 10/11/2018 - 12/13/2018, then transitioned to Opdivo every 4 weeks as of 1/17/2019 and completed of 9/12/2019. He has remained on surveillance since then. \par  \par The patient has no history of skin malignant melanoma. He has had a recent assessment  by dermatology of lesions of the right arm and left bridge of nose. No diagnosis of malignancy was made. There is a past medical history of resection of neurofibromas; He dose not know of neurofibromas in his past family history including his natural mother and his natural father; he is adopted.  [de-identified] : metastatic melanoma [de-identified] : tumor cells positive for S 100, Melan A ,SOX 10, HMG 45.\par negative for T T F 1, A E 1.3.,p 40\par positive c kit [FreeTextEntry1] : s/p 4 cycles of combination ipilimumab + nivolumab (10/11/2018 - 12/13/2018), s/p 9 months single agent nivolumab (1/17/2019 - 9/12/2019) [de-identified] : Patient currently feels fine; denied experiencing any new symptoms at this time. He continues to practice appropriate guidelines regarding COVID prevention. He completed lab studies last week.

## 2021-01-28 NOTE — ASSESSMENT
[FreeTextEntry1] : Wali Montoya is a 60 year old male diagnosed with metastatic melanoma of his right jaw, s/p 1 year of immunotherapy (4 cycles of Yervoy/Opdivo + 9 cycles of monthly Opdivo). Follow-up lab studies from 1/21/2021 continued to demonstrate low cortisol level. However, patient appeared well today during his telehealth encounter and his physical examination findings remain stable. He agreed on the following: \par \par - Remain on surveillance as directed. Plan to repeat imaging studies in Mid-May 2021. \par - Continue amlodipine (hypertension) and hydrocortisone (immunotherapy induced cortisol deficiency) at this time. \par - Plan to request lab studies at [facility] location during the 3rd week of May 2021; orders are in [employer]. \par - Follow-up with PCP, dermatology as directed. \par - Return to the office in 4 months via TEB for discussion of lab + imaging study results; patient agreed to call the office to set up visit during the 4th week of May 2021. \par

## 2021-03-05 ENCOUNTER — RX RENEWAL (OUTPATIENT)
Age: 61
End: 2021-03-05

## 2021-05-24 ENCOUNTER — RX RENEWAL (OUTPATIENT)
Age: 61
End: 2021-05-24

## 2021-05-25 ENCOUNTER — OUTPATIENT (OUTPATIENT)
Dept: OUTPATIENT SERVICES | Facility: HOSPITAL | Age: 61
LOS: 1 days | Discharge: HOME | End: 2021-05-25

## 2021-05-25 DIAGNOSIS — C43.39 MALIGNANT MELANOMA OF OTHER PARTS OF FACE: ICD-10-CM

## 2021-05-26 ENCOUNTER — RESULT REVIEW (OUTPATIENT)
Age: 61
End: 2021-05-26

## 2021-05-26 ENCOUNTER — APPOINTMENT (OUTPATIENT)
Dept: HEMATOLOGY ONCOLOGY | Facility: CLINIC | Age: 61
End: 2021-05-26
Payer: COMMERCIAL

## 2021-05-26 ENCOUNTER — LABORATORY RESULT (OUTPATIENT)
Age: 61
End: 2021-05-26

## 2021-05-26 VITALS
HEART RATE: 105 BPM | TEMPERATURE: 96.8 F | DIASTOLIC BLOOD PRESSURE: 98 MMHG | BODY MASS INDEX: 29.74 KG/M2 | SYSTOLIC BLOOD PRESSURE: 165 MMHG | HEIGHT: 72.83 IN | OXYGEN SATURATION: 96 % | RESPIRATION RATE: 17 BRPM | WEIGHT: 224.43 LBS

## 2021-05-26 LAB
BASOPHILS # BLD AUTO: 0.05 K/UL — SIGNIFICANT CHANGE UP (ref 0–0.2)
BASOPHILS NFR BLD AUTO: 0.9 % — SIGNIFICANT CHANGE UP (ref 0–2)
EOSINOPHIL # BLD AUTO: 0.07 K/UL — SIGNIFICANT CHANGE UP (ref 0–0.5)
EOSINOPHIL NFR BLD AUTO: 1.2 % — SIGNIFICANT CHANGE UP (ref 0–6)
HCT VFR BLD CALC: 40.1 % — SIGNIFICANT CHANGE UP (ref 39–50)
HGB BLD-MCNC: 14.3 G/DL — SIGNIFICANT CHANGE UP (ref 13–17)
IMM GRANULOCYTES NFR BLD AUTO: 0.2 % — SIGNIFICANT CHANGE UP (ref 0–1.5)
LYMPHOCYTES # BLD AUTO: 2.12 K/UL — SIGNIFICANT CHANGE UP (ref 1–3.3)
LYMPHOCYTES # BLD AUTO: 36.4 % — SIGNIFICANT CHANGE UP (ref 13–44)
MCHC RBC-ENTMCNC: 32 PG — SIGNIFICANT CHANGE UP (ref 27–34)
MCHC RBC-ENTMCNC: 35.7 G/DL — SIGNIFICANT CHANGE UP (ref 32–36)
MCV RBC AUTO: 89.7 FL — SIGNIFICANT CHANGE UP (ref 80–100)
MONOCYTES # BLD AUTO: 0.5 K/UL — SIGNIFICANT CHANGE UP (ref 0–0.9)
MONOCYTES NFR BLD AUTO: 8.6 % — SIGNIFICANT CHANGE UP (ref 2–14)
NEUTROPHILS # BLD AUTO: 3.08 K/UL — SIGNIFICANT CHANGE UP (ref 1.8–7.4)
NEUTROPHILS NFR BLD AUTO: 52.7 % — SIGNIFICANT CHANGE UP (ref 43–77)
NRBC # BLD: 0 /100 WBCS — SIGNIFICANT CHANGE UP (ref 0–0)
PLATELET # BLD AUTO: 216 K/UL — SIGNIFICANT CHANGE UP (ref 150–400)
RBC # BLD: 4.47 M/UL — SIGNIFICANT CHANGE UP (ref 4.2–5.8)
RBC # FLD: 12.8 % — SIGNIFICANT CHANGE UP (ref 10.3–14.5)
WBC # BLD: 5.83 K/UL — SIGNIFICANT CHANGE UP (ref 3.8–10.5)
WBC # FLD AUTO: 5.83 K/UL — SIGNIFICANT CHANGE UP (ref 3.8–10.5)

## 2021-05-26 PROCEDURE — 99214 OFFICE O/P EST MOD 30 MIN: CPT

## 2021-05-26 PROCEDURE — 99072 ADDL SUPL MATRL&STAF TM PHE: CPT

## 2021-05-26 RX ORDER — AMLODIPINE BESYLATE 5 MG/1
5 TABLET ORAL DAILY
Qty: 30 | Refills: 6 | Status: DISCONTINUED | COMMUNITY
Start: 2020-02-12 | End: 2021-05-26

## 2021-05-26 NOTE — ASSESSMENT
[Supportive] : Goals of care discussed with patient: Supportive [Palliative Care Plan] : not applicable at this time [FreeTextEntry1] : RODY Montoya is a 61 year old male who is ow two years status post immune treatment for metastatic malignant melanoma; the patient has been feeling well.  CT scanning results of neck chest and abdomen  were reviewed from several months ago. He feels well and he has not noted new skin lesion except for a small forehead subcutaneous nodule which appears to be an infected hair follicle. I advise application of moist heat and cephalexin 500 mg PO TID. I reordered CT scanning of maxilla facial and neck chest and abdomen for mid June 2021 \par RTC in 4 months

## 2021-05-26 NOTE — HISTORY OF PRESENT ILLNESS
[Disease: _____________________] : Disease: [unfilled] [T: ___] : T[unfilled] [N: ___] : N[unfilled] [M: ___] : M[unfilled] [AJCC Stage: ____] : AJCC Stage: [unfilled] [Treatment Protocol] : Treatment Protocol [D] : probable [1] : 1, Mild [E] : definite [Date: ____________] : Patient's last distress assessment performed on [unfilled]. [0 - No Distress] : Distress Level: 0 [de-identified] : This 61 year old male describes a mass in the right lower jaw. The patient has a history of dental discomfort and he had a 3rd molar resection (he describes it as a wisdom tooth in the last week of June 2018. Approximately 4 weeks later he noted persistent swelling in the area and he was given a course of antibiotic treatment beneath the right jaw extending to the right buccal area in the last week of July 2018. After one week there was no definite shrinkage, and he went to an urgent care center; a CXR was requested and the right jaw mass was noted; he was referred to [facility] ER  and  CT scan was requested of the chest and ST of the neck and jaw.\par The  CT of the chest showed a  2.5 cm mass of the left lingula .He was referred for follow up by an oral surgeon Dr Jimenez in [facility] who did an incision and drainage and biopsies of the  mandible on August 18 2018. Finding revealed malignant melanoma as Dr Jimenez had sent the slide to  ERIKA GARCIA  for review by Dr Snyder\par He was seen by a primary care physician KRISHAN Peralta and a pulmonologist Tana Scott.\par A CT/PET performed on September 3 2018; the scan was described as showing a FG G avid mass in the right mandible with an S U V of  and an abnormality in the left  lung lingula that measures 2.0 X 3.1 cm and is  F D G avid S U V 15.5 ; there is a second left upper lobe nodule that is small and it was  non FD G avid; he has a right leg intramuscular lesion with low F D G  uptake. Intramuscular lesions and chest wall lesions are thought to be neurofibroma and are not F D G avid. He has a right renal cyst. \par The patient has no history of skin malignant melanoma. He has had a recent assessment  by dermatology of lesions of the right arm and left bridge of nose. No diagnosis of malignancy was made.\par There is a past medical history of resection of neurofibromas; He dose not know of neurofibromas in his past family history including his natural mother and his natural father; he is adopted.  [de-identified] : metastatic melanoma [de-identified] : tumor cells positive for S 100, Melan A ,SOX 10, HMG 45.\par negative for T T F 1, A E 1.3.,p 40\par positive c kit [FreeTextEntry1] : nivolumab 480 mg IV monthly (twelfth month of therapy; s/p combination ipilimumab- nivolumab followed by 9 months single agent nivolumab) last treatment September 2019 [de-identified] : The patient has felt well over the past several months. This is our first in office visit since February 2021. He did not develop COVID 19 and he has had vaccination. 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

## 2021-05-26 NOTE — REVIEW OF SYSTEMS
[Negative] : Allergic/Immunologic [Fever] : no fever [Chills] : no chills [Night Sweats] : no night sweats [Fatigue] : no fatigue [Recent Change In Weight] : ~T no recent weight change [Eye Pain] : no eye pain [Red Eyes] : eyes not red [Dry Eyes] : no dryness of the eyes [Vision Problems] : no vision problems [Dysphagia] : no dysphagia [Loss of Hearing] : no loss of hearing [Nosebleeds] : no nosebleeds [Hoarseness] : no hoarseness [Odynophagia] : no odynophagia [Mucosal Pain] : no mucosal pain [Chest Pain] : no chest pain [Palpitations] : no palpitations [Leg Claudication] : no intermittent leg claudication [Lower Ext Edema] : no lower extremity edema [Shortness Of Breath] : no shortness of breath [Wheezing] : no wheezing [Cough] : no cough [SOB on Exertion] : no shortness of breath during exertion [Abdominal Pain] : no abdominal pain [Vomiting] : no vomiting [Constipation] : no constipation [Diarrhea] : no diarrhea [Dysuria] : no dysuria [Incontinence] : no incontinence [Joint Pain] : no joint pain [Joint Stiffness] : no joint stiffness [Muscle Pain] : no muscle pain [Skin Rash] : no skin rash [Skin Wound] : no skin wound [Confused] : no confusion [Dizziness] : no dizziness [Fainting] : no fainting [Difficulty Walking] : no difficulty walking [Suicidal] : not suicidal [Insomnia] : no insomnia [Anxiety] : no anxiety [Depression] : no depression [Proptosis] : no proptosis [Hot Flashes] : no hot flashes [Muscle Weakness] : no muscle weakness [Deepening Of The Voice] : no deepening of the voice [Easy Bleeding] : no tendency for easy bleeding [Easy Bruising] : no tendency for easy bruising [Swollen Glands] : no swollen glands

## 2021-05-27 LAB
ALBUMIN SERPL ELPH-MCNC: 4.3 G/DL
ALP BLD-CCNC: 62 U/L
ALT SERPL-CCNC: 15 U/L
ANION GAP SERPL CALC-SCNC: 14 MMOL/L
AST SERPL-CCNC: 19 U/L
BILIRUB SERPL-MCNC: 0.2 MG/DL
BUN SERPL-MCNC: 17 MG/DL
CALCIUM SERPL-MCNC: 9 MG/DL
CHLORIDE SERPL-SCNC: 106 MMOL/L
CO2 SERPL-SCNC: 20 MMOL/L
CREAT SERPL-MCNC: 1.25 MG/DL
GLUCOSE SERPL-MCNC: 94 MG/DL
POTASSIUM SERPL-SCNC: 4 MMOL/L
PROT SERPL-MCNC: 6.9 G/DL
SODIUM SERPL-SCNC: 141 MMOL/L
T3RU NFR SERPL: 1.1 TBI
T4 SERPL-MCNC: 7.4 UG/DL

## 2021-06-03 LAB
CORTICOSTEROID BIND GLOBULIN: 2 MG/DL
CORTIS SERPL-MCNC: <1 UG/DL
CORTISOL, FREE: <0.03 UG/DL
PFCX: <2.8 %

## 2021-06-19 ENCOUNTER — OUTPATIENT (OUTPATIENT)
Dept: OUTPATIENT SERVICES | Facility: HOSPITAL | Age: 61
LOS: 1 days | End: 2021-06-19
Payer: COMMERCIAL

## 2021-06-19 ENCOUNTER — APPOINTMENT (OUTPATIENT)
Dept: CT IMAGING | Facility: IMAGING CENTER | Age: 61
End: 2021-06-19
Payer: COMMERCIAL

## 2021-06-19 ENCOUNTER — RESULT REVIEW (OUTPATIENT)
Age: 61
End: 2021-06-19

## 2021-06-19 DIAGNOSIS — C34.30 MALIGNANT NEOPLASM OF LOWER LOBE, UNSPECIFIED BRONCHUS OR LUNG: ICD-10-CM

## 2021-06-19 DIAGNOSIS — Z00.8 ENCOUNTER FOR OTHER GENERAL EXAMINATION: ICD-10-CM

## 2021-06-19 PROCEDURE — 70491 CT SOFT TISSUE NECK W/DYE: CPT

## 2021-06-19 PROCEDURE — 71260 CT THORAX DX C+: CPT | Mod: 26

## 2021-06-19 PROCEDURE — 74177 CT ABD & PELVIS W/CONTRAST: CPT

## 2021-06-19 PROCEDURE — 70491 CT SOFT TISSUE NECK W/DYE: CPT | Mod: 26

## 2021-06-19 PROCEDURE — 74177 CT ABD & PELVIS W/CONTRAST: CPT | Mod: 26

## 2021-06-19 PROCEDURE — 71260 CT THORAX DX C+: CPT

## 2021-06-19 PROCEDURE — 82565 ASSAY OF CREATININE: CPT

## 2021-07-01 ENCOUNTER — TRANSCRIPTION ENCOUNTER (OUTPATIENT)
Age: 61
End: 2021-07-01

## 2021-07-06 ENCOUNTER — NON-APPOINTMENT (OUTPATIENT)
Age: 61
End: 2021-07-06

## 2021-07-12 ENCOUNTER — APPOINTMENT (OUTPATIENT)
Dept: INTERNAL MEDICINE | Facility: CLINIC | Age: 61
End: 2021-07-12
Payer: COMMERCIAL

## 2021-07-12 VITALS
BODY MASS INDEX: 30.48 KG/M2 | WEIGHT: 225 LBS | OXYGEN SATURATION: 97 % | HEIGHT: 72 IN | RESPIRATION RATE: 16 BRPM | SYSTOLIC BLOOD PRESSURE: 151 MMHG | HEART RATE: 88 BPM | DIASTOLIC BLOOD PRESSURE: 92 MMHG | TEMPERATURE: 96.9 F

## 2021-07-12 DIAGNOSIS — B35.1 TINEA UNGUIUM: ICD-10-CM

## 2021-07-12 DIAGNOSIS — Z12.11 ENCOUNTER FOR SCREENING FOR MALIGNANT NEOPLASM OF COLON: ICD-10-CM
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
## 2021-07-12 PROCEDURE — 36415 COLL VENOUS BLD VENIPUNCTURE: CPT

## 2021-07-12 PROCEDURE — 99214 OFFICE O/P EST MOD 30 MIN: CPT | Mod: 25

## 2021-07-12 PROCEDURE — 99072 ADDL SUPL MATRL&STAF TM PHE: CPT

## 2021-07-12 RX ORDER — CEPHALEXIN 500 MG/1
500 CAPSULE ORAL 3 TIMES DAILY
Qty: 30 | Refills: 0 | Status: DISCONTINUED | COMMUNITY
Start: 2021-05-26 | End: 2021-07-12

## 2021-07-12 RX ORDER — BLOOD PRESSURE TEST KIT-LARGE
KIT MISCELLANEOUS
Qty: 1 | Refills: 0 | Status: DISCONTINUED | COMMUNITY
Start: 2018-08-27 | End: 2021-07-12

## 2021-07-14 NOTE — REVIEW OF SYSTEMS
[Fever] : no fever [Night Sweats] : no night sweats [Discharge] : no discharge [Vision Problems] : no vision problems [Chest Pain] : no chest pain [Palpitations] : no palpitations [Shortness Of Breath] : no shortness of breath [Cough] : no cough [Nausea] : no nausea [Vomiting] : no vomiting [Dysuria] : no dysuria [Hematuria] : no hematuria

## 2021-07-14 NOTE — HISTORY OF PRESENT ILLNESS
[de-identified] : Being followed by Onc Q3 mo for surveillance of Metastatic Melanamo to face and a lesion in the lung Bx positive.   Txed with Immunotherapy.\par Being treatment for HTN.  No nausea, vomiting, diarrhea, and constipation. No chest pain or shortness of breath.

## 2021-07-26 LAB
CHOLEST SERPL-MCNC: 148 MG/DL
HDLC SERPL-MCNC: 39 MG/DL
LDLC SERPL CALC-MCNC: 78 MG/DL
NONHDLC SERPL-MCNC: 108 MG/DL
TRIGL SERPL-MCNC: 152 MG/DL

## 2021-08-11 ENCOUNTER — APPOINTMENT (OUTPATIENT)
Dept: DERMATOLOGY | Facility: CLINIC | Age: 61
End: 2021-08-11

## 2021-08-20 ENCOUNTER — OUTPATIENT (OUTPATIENT)
Dept: OUTPATIENT SERVICES | Facility: HOSPITAL | Age: 61
LOS: 1 days | Discharge: HOME | End: 2021-08-20

## 2021-08-20 DIAGNOSIS — C43.39 MALIGNANT MELANOMA OF OTHER PARTS OF FACE: ICD-10-CM

## 2021-08-23 ENCOUNTER — RESULT REVIEW (OUTPATIENT)
Age: 61
End: 2021-08-23

## 2021-08-23 ENCOUNTER — APPOINTMENT (OUTPATIENT)
Dept: HEMATOLOGY ONCOLOGY | Facility: CLINIC | Age: 61
End: 2021-08-23
Payer: COMMERCIAL

## 2021-08-23 VITALS
DIASTOLIC BLOOD PRESSURE: 87 MMHG | HEART RATE: 98 BPM | HEIGHT: 72.01 IN | OXYGEN SATURATION: 98 % | SYSTOLIC BLOOD PRESSURE: 136 MMHG | WEIGHT: 225.97 LBS | BODY MASS INDEX: 30.61 KG/M2 | RESPIRATION RATE: 18 BRPM | TEMPERATURE: 97.8 F

## 2021-08-23 DIAGNOSIS — R91.1 SOLITARY PULMONARY NODULE: ICD-10-CM

## 2021-08-23 DIAGNOSIS — M27.9 DISEASE OF JAWS, UNSPECIFIED: ICD-10-CM

## 2021-08-23 LAB
BASOPHILS # BLD AUTO: 0.05 K/UL — SIGNIFICANT CHANGE UP (ref 0–0.2)
BASOPHILS NFR BLD AUTO: 0.7 % — SIGNIFICANT CHANGE UP (ref 0–2)
EOSINOPHIL # BLD AUTO: 0.05 K/UL — SIGNIFICANT CHANGE UP (ref 0–0.5)
EOSINOPHIL NFR BLD AUTO: 0.7 % — SIGNIFICANT CHANGE UP (ref 0–6)
HCT VFR BLD CALC: 40.5 % — SIGNIFICANT CHANGE UP (ref 39–50)
HGB BLD-MCNC: 14.4 G/DL — SIGNIFICANT CHANGE UP (ref 13–17)
IMM GRANULOCYTES NFR BLD AUTO: 0.9 % — SIGNIFICANT CHANGE UP (ref 0–1.5)
LYMPHOCYTES # BLD AUTO: 1.61 K/UL — SIGNIFICANT CHANGE UP (ref 1–3.3)
LYMPHOCYTES # BLD AUTO: 23.6 % — SIGNIFICANT CHANGE UP (ref 13–44)
MCHC RBC-ENTMCNC: 31.3 PG — SIGNIFICANT CHANGE UP (ref 27–34)
MCHC RBC-ENTMCNC: 35.6 G/DL — SIGNIFICANT CHANGE UP (ref 32–36)
MCV RBC AUTO: 88 FL — SIGNIFICANT CHANGE UP (ref 80–100)
MONOCYTES # BLD AUTO: 0.53 K/UL — SIGNIFICANT CHANGE UP (ref 0–0.9)
MONOCYTES NFR BLD AUTO: 7.8 % — SIGNIFICANT CHANGE UP (ref 2–14)
NEUTROPHILS # BLD AUTO: 4.51 K/UL — SIGNIFICANT CHANGE UP (ref 1.8–7.4)
NEUTROPHILS NFR BLD AUTO: 66.3 % — SIGNIFICANT CHANGE UP (ref 43–77)
NRBC # BLD: 0 /100 WBCS — SIGNIFICANT CHANGE UP (ref 0–0)
PLATELET # BLD AUTO: 201 K/UL — SIGNIFICANT CHANGE UP (ref 150–400)
RBC # BLD: 4.6 M/UL — SIGNIFICANT CHANGE UP (ref 4.2–5.8)
RBC # FLD: 12.4 % — SIGNIFICANT CHANGE UP (ref 10.3–14.5)
WBC # BLD: 6.81 K/UL — SIGNIFICANT CHANGE UP (ref 3.8–10.5)
WBC # FLD AUTO: 6.81 K/UL — SIGNIFICANT CHANGE UP (ref 3.8–10.5)

## 2021-08-23 PROCEDURE — 99214 OFFICE O/P EST MOD 30 MIN: CPT

## 2021-08-25 NOTE — HISTORY OF PRESENT ILLNESS
[Disease: _____________________] : Disease: [unfilled] [T: ___] : T[unfilled] [N: ___] : N[unfilled] [M: ___] : M[unfilled] [AJCC Stage: ____] : AJCC Stage: [unfilled] [Treatment Protocol] : Treatment Protocol [0 - No Distress] : Distress Level: 0 [D] : probable [1] : 1, Mild [E] : definite [Date: ____________] : Patient's last distress assessment performed on [unfilled]. [de-identified] : This 61 year old male describes a mass in the right lower jaw. The patient has a history of dental discomfort and he had a 3rd molar resection (he describes it as a wisdom tooth in the last week of June 2018. Approximately 4 weeks later he noted persistent swelling in the area and he was given a course of antibiotic treatment beneath the right jaw extending to the right buccal area in the last week of July 2018. After one week there was no definite shrinkage, and he went to an urgent care center; a CXR was requested and the right jaw mass was noted; he was referred to [facility] ER  and  CT scan was requested of the chest and ST of the neck and jaw.\par The  CT of the chest showed a  2.5 cm mass of the left lingula .He was referred for follow up by an oral surgeon Dr Jimenez in [facility] who did an incision and drainage and biopsies of the  mandible on August 18 2018. Finding revealed malignant melanoma as Dr Jimenez had sent the slide to  ERIKA GARCIA  for review by Dr Snyder\par He was seen by a primary care physician KRISHAN Peralta and a pulmonologist Tana Scott.\par A CT/PET performed on September 3 2018; the scan was described as showing a FG G avid mass in the right mandible with an S U V of  and an abnormality in the left  lung lingula that measures 2.0 X 3.1 cm and is  F D G avid S U V 15.5 ; there is a second left upper lobe nodule that is small and it was  non FD G avid; he has a right leg intramuscular lesion with low F D G  uptake. Intramuscular lesions and chest wall lesions are thought to be neurofibroma and are not F D G avid. He has a right renal cyst. \par The patient has no history of skin malignant melanoma. He has had a recent assessment  by dermatology of lesions of the right arm and left bridge of nose. No diagnosis of malignancy was made.\par There is a past medical history of resection of neurofibromas; He dose not know of neurofibromas in his past family history including his natural mother and his natural father; he is adopted.  [de-identified] : metastatic melanoma [de-identified] : tumor cells positive for S 100, Melan A ,SOX 10, HMG 45.\par negative for T T F 1, A E 1.3.,p 40\par positive c kit [FreeTextEntry1] : nivolumab 480 mg IV monthly (twelfth month of therapy; s/p combination ipilimumab- nivolumab followed by 9 months single agent nivolumab) last treatment September 2019 [de-identified] : The patient has felt well over the past several months. This is our first in office visit since February 2021. He did not develop COVID 19 and he has had vaccination. 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 he notes a small skin lesion over the right eyebrow.\par No new lesions in sites of prior disease in his jaw.\par He has been feeling well over the past 90 days last visit may 2021. 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

## 2021-08-25 NOTE — ASSESSMENT
[Supportive] : Goals of care discussed with patient: Supportive [Palliative Care Plan] : not applicable at this time [FreeTextEntry1] : RODY Montoya is a 61 year old male with a prior history in 2018 of malignant melanoma; he is status post treatment with ipilumab and nivolumab; the physical examination is normal and the CT imaging is not showing new disease. he has stable chest wall lesion related to a fibroma.He has a known history of fibroma\par Blood pressure is controlled and he is sleeping better as he is taking antihypertension medication.\par \par RTC in 3 months

## 2021-10-21 ENCOUNTER — APPOINTMENT (OUTPATIENT)
Dept: DERMATOLOGY | Facility: CLINIC | Age: 61
End: 2021-10-21
Payer: COMMERCIAL

## 2021-10-21 DIAGNOSIS — L82.0 INFLAMED SEBORRHEIC KERATOSIS: ICD-10-CM

## 2021-10-21 DIAGNOSIS — D22.9 MELANOCYTIC NEVI, UNSPECIFIED: ICD-10-CM

## 2021-10-21 DIAGNOSIS — B35.1 TINEA UNGUIUM: ICD-10-CM

## 2021-10-21 PROCEDURE — 17000 DESTRUCT PREMALG LESION: CPT | Mod: 59

## 2021-10-21 PROCEDURE — 99213 OFFICE O/P EST LOW 20 MIN: CPT | Mod: 25

## 2021-10-21 PROCEDURE — 17003 DESTRUCT PREMALG LES 2-14: CPT | Mod: 59

## 2021-10-21 PROCEDURE — 17110 DESTRUCTION B9 LES UP TO 14: CPT

## 2021-10-21 NOTE — HISTORY OF PRESENT ILLNESS
[FreeTextEntry1] : fu metastatic melanoma  [de-identified] : 60 yo M here for above. 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 Last visit with oncology August 2021. \par \par \par No night sweats or unintentional weight loss. New lesion in R eyebrow. Stopped ketoconazole because athlete's foot improved but it came back. \par \par

## 2021-10-21 NOTE — PHYSICAL EXAM
[Alert] : alert [Oriented x 3] : ~L oriented x 3 [Well Nourished] : well nourished [Conjunctiva Non-injected] : conjunctiva non-injected [No Visual Lymphadenopathy] : no visual  lymphadenopathy [No Clubbing] : no clubbing [No Edema] : no edema [No Bromhidrosis] : no bromhidrosis [No Chromhidrosis] : no chromhidrosis [Full Body Skin Exam Performed] : performed [FreeTextEntry3] : \par \par No LAD head, neck, axilla or groin\par thickening and discoloration of toenails\par Oral, genital and perianal exam done\par stuck on tan papule right eyebrow\par scaling webspaces\par gritty pink papules nose

## 2021-10-31 ENCOUNTER — TRANSCRIPTION ENCOUNTER (OUTPATIENT)
Age: 61
End: 2021-10-31

## 2021-11-08 ENCOUNTER — NON-APPOINTMENT (OUTPATIENT)
Age: 61
End: 2021-11-08

## 2021-11-08 ENCOUNTER — APPOINTMENT (OUTPATIENT)
Dept: OPHTHALMOLOGY | Facility: CLINIC | Age: 61
End: 2021-11-08
Payer: COMMERCIAL

## 2021-11-08 PROCEDURE — 92014 COMPRE OPH EXAM EST PT 1/>: CPT

## 2021-11-11 ENCOUNTER — OUTPATIENT (OUTPATIENT)
Dept: OUTPATIENT SERVICES | Facility: HOSPITAL | Age: 61
LOS: 1 days | Discharge: HOME | End: 2021-11-11

## 2021-11-11 DIAGNOSIS — C43.39 MALIGNANT MELANOMA OF OTHER PARTS OF FACE: ICD-10-CM

## 2021-11-15 ENCOUNTER — APPOINTMENT (OUTPATIENT)
Dept: HEMATOLOGY ONCOLOGY | Facility: CLINIC | Age: 61
End: 2021-11-15
Payer: COMMERCIAL

## 2021-11-15 ENCOUNTER — RESULT REVIEW (OUTPATIENT)
Age: 61
End: 2021-11-15

## 2021-11-15 VITALS
BODY MASS INDEX: 30.69 KG/M2 | SYSTOLIC BLOOD PRESSURE: 136 MMHG | OXYGEN SATURATION: 95 % | DIASTOLIC BLOOD PRESSURE: 93 MMHG | TEMPERATURE: 97.3 F | WEIGHT: 226.61 LBS | RESPIRATION RATE: 16 BRPM | HEART RATE: 106 BPM | HEIGHT: 72.01 IN

## 2021-11-15 DIAGNOSIS — R06.6 HICCOUGH: ICD-10-CM

## 2021-11-15 LAB
BASOPHILS # BLD AUTO: 0.05 K/UL — SIGNIFICANT CHANGE UP (ref 0–0.2)
BASOPHILS NFR BLD AUTO: 0.7 % — SIGNIFICANT CHANGE UP (ref 0–2)
CORTIS SERPL-MCNC: 4.5 UG/DL
EOSINOPHIL # BLD AUTO: 0.03 K/UL — SIGNIFICANT CHANGE UP (ref 0–0.5)
EOSINOPHIL NFR BLD AUTO: 0.4 % — SIGNIFICANT CHANGE UP (ref 0–6)
HCT VFR BLD CALC: 42.2 % — SIGNIFICANT CHANGE UP (ref 39–50)
HGB BLD-MCNC: 14.6 G/DL — SIGNIFICANT CHANGE UP (ref 13–17)
IMM GRANULOCYTES NFR BLD AUTO: 0.3 % — SIGNIFICANT CHANGE UP (ref 0–1.5)
LYMPHOCYTES # BLD AUTO: 1.84 K/UL — SIGNIFICANT CHANGE UP (ref 1–3.3)
LYMPHOCYTES # BLD AUTO: 26.5 % — SIGNIFICANT CHANGE UP (ref 13–44)
MCHC RBC-ENTMCNC: 31.3 PG — SIGNIFICANT CHANGE UP (ref 27–34)
MCHC RBC-ENTMCNC: 34.6 G/DL — SIGNIFICANT CHANGE UP (ref 32–36)
MCV RBC AUTO: 90.4 FL — SIGNIFICANT CHANGE UP (ref 80–100)
MONOCYTES # BLD AUTO: 0.43 K/UL — SIGNIFICANT CHANGE UP (ref 0–0.9)
MONOCYTES NFR BLD AUTO: 6.2 % — SIGNIFICANT CHANGE UP (ref 2–14)
NEUTROPHILS # BLD AUTO: 4.58 K/UL — SIGNIFICANT CHANGE UP (ref 1.8–7.4)
NEUTROPHILS NFR BLD AUTO: 65.9 % — SIGNIFICANT CHANGE UP (ref 43–77)
NRBC # BLD: 0 /100 WBCS — SIGNIFICANT CHANGE UP (ref 0–0)
PLATELET # BLD AUTO: 220 K/UL — SIGNIFICANT CHANGE UP (ref 150–400)
RBC # BLD: 4.67 M/UL — SIGNIFICANT CHANGE UP (ref 4.2–5.8)
RBC # FLD: 12.8 % — SIGNIFICANT CHANGE UP (ref 10.3–14.5)
WBC # BLD: 6.95 K/UL — SIGNIFICANT CHANGE UP (ref 3.8–10.5)
WBC # FLD AUTO: 6.95 K/UL — SIGNIFICANT CHANGE UP (ref 3.8–10.5)

## 2021-11-15 PROCEDURE — 99214 OFFICE O/P EST MOD 30 MIN: CPT

## 2021-11-16 LAB
ALBUMIN SERPL ELPH-MCNC: 4.5 G/DL
ALP BLD-CCNC: 61 U/L
ALT SERPL-CCNC: 16 U/L
ANION GAP SERPL CALC-SCNC: 14 MMOL/L
AST SERPL-CCNC: 18 U/L
BILIRUB SERPL-MCNC: 0.4 MG/DL
BUN SERPL-MCNC: 22 MG/DL
CALCIUM SERPL-MCNC: 8.9 MG/DL
CHLORIDE SERPL-SCNC: 104 MMOL/L
CO2 SERPL-SCNC: 24 MMOL/L
CREAT SERPL-MCNC: 1.2 MG/DL
GLUCOSE SERPL-MCNC: 100 MG/DL
POTASSIUM SERPL-SCNC: 4.3 MMOL/L
PROT SERPL-MCNC: 7.1 G/DL
SODIUM SERPL-SCNC: 141 MMOL/L
T4 SERPL-MCNC: 7.2 UG/DL

## 2021-12-24 ENCOUNTER — NON-APPOINTMENT (OUTPATIENT)
Age: 61
End: 2021-12-24

## 2021-12-24 PROBLEM — R06.6 HICCOUGHS: Status: ACTIVE | Noted: 2021-12-24

## 2021-12-27 NOTE — HISTORY OF PRESENT ILLNESS
[Disease: _____________________] : Disease: [unfilled] [T: ___] : T[unfilled] [N: ___] : N[unfilled] [M: ___] : M[unfilled] [AJCC Stage: ____] : AJCC Stage: [unfilled] [Treatment Protocol] : Treatment Protocol [Date: ____________] : Patient's last distress assessment performed on [unfilled]. [0 - No Distress] : Distress Level: 0 [de-identified] : This 61 year old male describes a mass in the right lower jaw. The patient has a history of dental discomfort and he had a 3rd molar resection (he describes it as a wisdom tooth in the last week of June 2018. Approximately 4 weeks later he noted persistent swelling in the area and he was given a course of antibiotic treatment beneath the right jaw extending to the right buccal area in the last week of July 2018. After one week there was no definite shrinkage, and he went to an urgent care center; a CXR was requested and the right jaw mass was noted; he was referred to [facility] ER  and  CT scan was requested of the chest and ST of the neck and jaw.\par The  CT of the chest showed a  2.5 cm mass of the left lingula .He was referred for follow up by an oral surgeon Dr Jimenez in [facility] who did an incision and drainage and biopsies of the  mandible on August 18 2018. Finding revealed malignant melanoma as Dr Jimenez had sent the slide to  ERIKA GARCIA  for review by Dr Snyder\par He was seen by a primary care physician KRISHAN Peralta and a pulmonologist Tana Scott.\par A CT/PET performed on September 3 2018; the scan was described as showing a FG G avid mass in the right mandible with an S U V of  and an abnormality in the left  lung lingula that measures 2.0 X 3.1 cm and is  F D G avid S U V 15.5 ; there is a second left upper lobe nodule that is small and it was  non FD G avid; he has a right leg intramuscular lesion with low F D G  uptake. Intramuscular lesions and chest wall lesions are thought to be neurofibroma and are not F D G avid. He has a right renal cyst. \par The patient has no history of skin malignant melanoma. He has had a recent assessment  by dermatology of lesions of the right arm and left bridge of nose. No diagnosis of malignancy was made.\par There is a past medical history of resection of neurofibromas; He dose not know of neurofibromas in his past family history including his natural mother and his natural father; he is adopted.  [de-identified] : metastatic melanoma [de-identified] : tumor cells positive for S 100, Melan A ,SOX 10, HMG 45.\par negative for T T F 1, A E 1.3.,p 40\par positive c kit [FreeTextEntry1] : nivolumab 480 mg IV monthly (twelfth month of therapy; s/p combination ipilimumab- nivolumab followed by 9 months single agent nivolumab) last treatment September 2019 [de-identified] : Patient fell three weeks ago; 10/26/2021; treated at [facility] in [facility] in [place]; mild bleeding; seen by Dr Piedra one week ago; beginning of cataract

## 2021-12-27 NOTE — ASSESSMENT
[Supportive] : Goals of care discussed with patient: Supportive [Palliative Care Plan] : not applicable at this time [FreeTextEntry1] : RODY Montoya is a 61 year old male with a prior history in 2018 of malignant melanoma; he is status post treatment with ipilumab and nivolumab; the physical examination is normal and the CT imaging is not showing new disease. he has stable chest wall lesion related to a fibroma.He has a known history of fibroma\par Blood pressure is controlled and he is sleeping better as he is taking antihypertension medication.\par \par He had a fall with injury to area beneath the left eye. Good control with one stitch at [facility] in [place].\par no examination finding of pigmented lesion on skin of legs arms torso; oral cavity examination and the cervical lymph nodes are normal\par  I am requesting CT scanning with contrast Creatine 1.25 : maxilla facial chest abdomen and pelvis\par RTC in 6 months

## 2022-01-01 NOTE — END OF VISIT
Mogen Clamp [Time Spent: ___ minutes] : I have spent [unfilled] minutes of face to face time with the patient [>50% of Time Spent on Counseling and Coordination of Care for  ___] : Greater than 50% of the encounter time was spent on counseling and coordination of care for [unfilled]

## 2022-01-10 ENCOUNTER — APPOINTMENT (OUTPATIENT)
Dept: INTERNAL MEDICINE | Facility: CLINIC | Age: 62
End: 2022-01-10
Payer: COMMERCIAL

## 2022-01-10 DIAGNOSIS — U07.1 COVID-19: ICD-10-CM

## 2022-01-10 DIAGNOSIS — Z11.59 ENCOUNTER FOR SCREENING FOR OTHER VIRAL DISEASES: ICD-10-CM

## 2022-01-10 PROCEDURE — 99214 OFFICE O/P EST MOD 30 MIN: CPT | Mod: 95

## 2022-01-10 NOTE — HISTORY OF PRESENT ILLNESS
[Home] : at home, [unfilled] , at the time of the visit. [Medical Office: ([facility])___] : at the medical office located in  [Verbal consent obtained from patient] : the patient, [unfilled] [de-identified] : 1) COVID infection 3 weeks ago.  Positive 12/28 at home then negative PCR 2 days after that.  Mild illness except for hiccups.  Had fatigued.  Had 3 vaccines.  Last Nov 5th\par 2.) Had f/u appointment Onc with normal labs.  Has f/u scans in May for Onc.  Remission x 3.5 years.\par 3.)  Compliant with BP med's\par 4.)  Referred and counselled on [place]

## 2022-01-27 NOTE — PHYSICAL EXAM
Yes [Fully active, able to carry on all pre-disease performance without restriction] : Status 0 - Fully active, able to carry on all pre-disease performance without restriction [Normal] : affect appropriate [de-identified] : stable left thorax subcutaneous mass 5 cm X 4 cm (assumed to be neurofibroma)

## 2022-04-11 PROBLEM — Z11.59 SCREENING FOR VIRAL DISEASE: Status: ACTIVE | Noted: 2022-01-10

## 2022-04-21 ENCOUNTER — APPOINTMENT (OUTPATIENT)
Dept: DERMATOLOGY | Facility: CLINIC | Age: 62
End: 2022-04-21
Payer: COMMERCIAL

## 2022-04-21 ENCOUNTER — LABORATORY RESULT (OUTPATIENT)
Age: 62
End: 2022-04-21

## 2022-04-21 DIAGNOSIS — D48.9 NEOPLASM OF UNCERTAIN BEHAVIOR, UNSPECIFIED: ICD-10-CM

## 2022-04-21 DIAGNOSIS — I87.8 OTHER SPECIFIED DISORDERS OF VEINS: ICD-10-CM

## 2022-04-21 PROCEDURE — 99213 OFFICE O/P EST LOW 20 MIN: CPT | Mod: 25

## 2022-04-21 PROCEDURE — 11103 TANGNTL BX SKIN EA SEP/ADDL: CPT | Mod: 59

## 2022-04-21 PROCEDURE — 17003 DESTRUCT PREMALG LES 2-14: CPT | Mod: 59

## 2022-04-21 PROCEDURE — 17000 DESTRUCT PREMALG LESION: CPT | Mod: 59

## 2022-04-21 PROCEDURE — 11102 TANGNTL BX SKIN SINGLE LES: CPT

## 2022-04-22 PROBLEM — I87.8 PROMINENT VEIN: Status: ACTIVE | Noted: 2022-04-21

## 2022-04-22 PROBLEM — D48.9 NEOPLASM OF UNCERTAIN BEHAVIOR: Status: ACTIVE | Noted: 2018-09-06

## 2022-04-22 NOTE — HISTORY OF PRESENT ILLNESS
[FreeTextEntry1] : fu metastatic melanoma  [de-identified] : 60 yo M here for above. 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 Last visit with oncology Nov. Seeing every 6 mos now. Next scan will be in May or June (doing yearly). New pink bump thigh went away. \par \par \par No night sweats or unintentional weight loss. \par \par

## 2022-04-22 NOTE — PHYSICAL EXAM
[Alert] : alert [Oriented x 3] : ~L oriented x 3 [Well Nourished] : well nourished [Conjunctiva Non-injected] : conjunctiva non-injected [No Visual Lymphadenopathy] : no visual  lymphadenopathy [No Clubbing] : no clubbing [No Edema] : no edema [No Bromhidrosis] : no bromhidrosis [No Chromhidrosis] : no chromhidrosis [Full Body Skin Exam Performed] : performed [FreeTextEntry3] : \par \par No LAD head, neck, axilla or groin\par thickening and discoloration of toenails with some normal regrowth at proximal fold\par Oral, genital and perianal exam done\par gritty pink papules x 2 right medial eyebrow\par bluish soft small plaque right thigh at site prior pink papule\par

## 2022-05-09 ENCOUNTER — TRANSCRIPTION ENCOUNTER (OUTPATIENT)
Age: 62
End: 2022-05-09

## 2022-05-11 ENCOUNTER — TRANSCRIPTION ENCOUNTER (OUTPATIENT)
Age: 62
End: 2022-05-11

## 2022-05-13 ENCOUNTER — NON-APPOINTMENT (OUTPATIENT)
Age: 62
End: 2022-05-13

## 2022-06-14 ENCOUNTER — APPOINTMENT (OUTPATIENT)
Dept: CT IMAGING | Facility: IMAGING CENTER | Age: 62
End: 2022-06-14
Payer: COMMERCIAL

## 2022-06-14 ENCOUNTER — OUTPATIENT (OUTPATIENT)
Dept: OUTPATIENT SERVICES | Facility: HOSPITAL | Age: 62
LOS: 1 days | End: 2022-06-14
Payer: COMMERCIAL

## 2022-06-14 DIAGNOSIS — C43.30 MALIGNANT MELANOMA OF UNSPECIFIED PART OF FACE: ICD-10-CM

## 2022-06-14 DIAGNOSIS — C79.89 SECONDARY MALIGNANT NEOPLASM OF OTHER SPECIFIED SITES: ICD-10-CM

## 2022-06-14 DIAGNOSIS — E27.49 OTHER ADRENOCORTICAL INSUFFICIENCY: ICD-10-CM

## 2022-06-14 DIAGNOSIS — Z00.8 ENCOUNTER FOR OTHER GENERAL EXAMINATION: ICD-10-CM

## 2022-06-14 PROCEDURE — 70487 CT MAXILLOFACIAL W/DYE: CPT

## 2022-06-14 PROCEDURE — 71260 CT THORAX DX C+: CPT | Mod: 26

## 2022-06-14 PROCEDURE — 70487 CT MAXILLOFACIAL W/DYE: CPT | Mod: 26

## 2022-06-14 PROCEDURE — 74177 CT ABD & PELVIS W/CONTRAST: CPT | Mod: 26

## 2022-06-14 PROCEDURE — 71260 CT THORAX DX C+: CPT

## 2022-06-14 PROCEDURE — 74177 CT ABD & PELVIS W/CONTRAST: CPT

## 2022-06-17 ENCOUNTER — OUTPATIENT (OUTPATIENT)
Dept: OUTPATIENT SERVICES | Facility: HOSPITAL | Age: 62
LOS: 1 days | Discharge: HOME | End: 2022-06-17

## 2022-06-17 DIAGNOSIS — C43.39 MALIGNANT MELANOMA OF OTHER PARTS OF FACE: ICD-10-CM

## 2022-06-22 ENCOUNTER — RESULT REVIEW (OUTPATIENT)
Age: 62
End: 2022-06-22

## 2022-06-22 ENCOUNTER — APPOINTMENT (OUTPATIENT)
Dept: HEMATOLOGY ONCOLOGY | Facility: CLINIC | Age: 62
End: 2022-06-22
Payer: COMMERCIAL

## 2022-06-22 VITALS
BODY MASS INDEX: 30.49 KG/M2 | OXYGEN SATURATION: 95 % | RESPIRATION RATE: 16 BRPM | TEMPERATURE: 97 F | WEIGHT: 222.64 LBS | HEIGHT: 71.77 IN | DIASTOLIC BLOOD PRESSURE: 89 MMHG | HEART RATE: 101 BPM | SYSTOLIC BLOOD PRESSURE: 133 MMHG

## 2022-06-22 LAB
BASOPHILS # BLD AUTO: 0.06 K/UL — SIGNIFICANT CHANGE UP (ref 0–0.2)
BASOPHILS NFR BLD AUTO: 0.9 % — SIGNIFICANT CHANGE UP (ref 0–2)
EOSINOPHIL # BLD AUTO: 0.04 K/UL — SIGNIFICANT CHANGE UP (ref 0–0.5)
EOSINOPHIL NFR BLD AUTO: 0.6 % — SIGNIFICANT CHANGE UP (ref 0–6)
HCT VFR BLD CALC: 41.4 % — SIGNIFICANT CHANGE UP (ref 39–50)
HGB BLD-MCNC: 14.6 G/DL — SIGNIFICANT CHANGE UP (ref 13–17)
IMM GRANULOCYTES NFR BLD AUTO: 0.3 % — SIGNIFICANT CHANGE UP (ref 0–1.5)
LYMPHOCYTES # BLD AUTO: 1.97 K/UL — SIGNIFICANT CHANGE UP (ref 1–3.3)
LYMPHOCYTES # BLD AUTO: 30.5 % — SIGNIFICANT CHANGE UP (ref 13–44)
MCHC RBC-ENTMCNC: 31.7 PG — SIGNIFICANT CHANGE UP (ref 27–34)
MCHC RBC-ENTMCNC: 35.3 G/DL — SIGNIFICANT CHANGE UP (ref 32–36)
MCV RBC AUTO: 89.8 FL — SIGNIFICANT CHANGE UP (ref 80–100)
MONOCYTES # BLD AUTO: 0.44 K/UL — SIGNIFICANT CHANGE UP (ref 0–0.9)
MONOCYTES NFR BLD AUTO: 6.8 % — SIGNIFICANT CHANGE UP (ref 2–14)
NEUTROPHILS # BLD AUTO: 3.93 K/UL — SIGNIFICANT CHANGE UP (ref 1.8–7.4)
NEUTROPHILS NFR BLD AUTO: 60.9 % — SIGNIFICANT CHANGE UP (ref 43–77)
NRBC # BLD: 0 /100 WBCS — SIGNIFICANT CHANGE UP (ref 0–0)
PLATELET # BLD AUTO: 223 K/UL — SIGNIFICANT CHANGE UP (ref 150–400)
RBC # BLD: 4.61 M/UL — SIGNIFICANT CHANGE UP (ref 4.2–5.8)
RBC # FLD: 12.3 % — SIGNIFICANT CHANGE UP (ref 10.3–14.5)
WBC # BLD: 6.46 K/UL — SIGNIFICANT CHANGE UP (ref 3.8–10.5)
WBC # FLD AUTO: 6.46 K/UL — SIGNIFICANT CHANGE UP (ref 3.8–10.5)

## 2022-06-22 PROCEDURE — 99215 OFFICE O/P EST HI 40 MIN: CPT

## 2022-06-23 LAB
ALBUMIN SERPL ELPH-MCNC: 4.6 G/DL
ALP BLD-CCNC: 58 U/L
ALT SERPL-CCNC: 16 U/L
ANION GAP SERPL CALC-SCNC: 12 MMOL/L
AST SERPL-CCNC: 21 U/L
BILIRUB SERPL-MCNC: 0.4 MG/DL
BUN SERPL-MCNC: 22 MG/DL
CALCIUM SERPL-MCNC: 9.2 MG/DL
CHLORIDE SERPL-SCNC: 107 MMOL/L
CO2 SERPL-SCNC: 23 MMOL/L
CORTIS SERPL-MCNC: 1 UG/DL
CREAT SERPL-MCNC: 1.33 MG/DL
EGFR: 60 ML/MIN/1.73M2
POTASSIUM SERPL-SCNC: 4.4 MMOL/L
PROT SERPL-MCNC: 7.2 G/DL
SODIUM SERPL-SCNC: 142 MMOL/L
TSH SERPL-ACNC: 3.94 UIU/ML

## 2022-06-24 NOTE — RESULTS/DATA
[FreeTextEntry1] : normal CBC review of prior normal CMP; note lower cortisol level 11/2021\par CT maxilla and lung are stable

## 2022-06-24 NOTE — HISTORY OF PRESENT ILLNESS
[Disease: _____________________] : Disease: [unfilled] [T: ___] : T[unfilled] [N: ___] : N[unfilled] [M: ___] : M[unfilled] [AJCC Stage: ____] : AJCC Stage: [unfilled] [Treatment Protocol] : Treatment Protocol [0 - No Distress] : Distress Level: 0 [Date: ____________] : Patient's last distress assessment performed on [unfilled]. [100: Normal, no complaints, no evidence of disease.] : 100: Normal, no complaints, no evidence of disease. [ECOG Performance Status: 0 - Fully active, able to carry on all pre-disease performance without restriction] : Performance Status: 0 - Fully active, able to carry on all pre-disease performance without restriction [de-identified] : This 62 year old male describes a mass in the right lower jaw. The patient has a history of dental discomfort and he had a 3rd molar resection (he describes it as a wisdom tooth in the last week of June 2018. Approximately 4 weeks later he noted persistent swelling in the area and he was given a course of antibiotic treatment beneath the right jaw extending to the right buccal area in the last week of July 2018. After one week there was no definite shrinkage, and he went to an urgent care center; a CXR was requested and the right jaw mass was noted; he was referred to [facility] ER  and  CT scan was requested of the chest and ST of the neck and jaw.\par The  CT of the chest showed a  2.5 cm mass of the left lingula .He was referred for follow up by an oral surgeon Dr Jimenez in [facility] who did an incision and drainage and biopsies of the  mandible on August 18 2018. Finding revealed malignant melanoma as Dr Jimenez had sent the slide to  ERIKA GARCIA  for review by Dr Snyder\par He was seen by a primary care physician KRISHAN Peralta and a pulmonologist Tana Scott.\par A CT/PET performed on September 3 2018; the scan was described as showing a FG G avid mass in the right mandible with an S U V of  and an abnormality in the left  lung lingula that measures 2.0 X 3.1 cm and is  F D G avid S U V 15.5 ; there is a second left upper lobe nodule that is small and it was  non FD G avid; he has a right leg intramuscular lesion with low F D G  uptake. Intramuscular lesions and chest wall lesions are thought to be neurofibroma and are not F D G avid. He has a right renal cyst. \par The patient has no history of skin malignant melanoma. He has had a recent assessment  by dermatology of lesions of the right arm and left bridge of nose. No diagnosis of malignancy was made.\par There is a past medical history of resection of neurofibromas; He dose not know of neurofibromas in his past family history including his natural mother and his natural father; he is adopted.  [de-identified] : metastatic melanoma [de-identified] : tumor cells positive for S 100, Melan A ,SOX 10, HMG 45.\par negative for T T F 1, A E 1.3.,p 40\par positive c kit [FreeTextEntry1] : nivolumab 480 mg IV monthly (twelfth month of therapy; s/p combination ipilimumab- nivolumab followed by 9 months single agent nivolumab) last treatment September 2019 [de-identified] : Patient fell three weeks ago; 10/26/2021; treated at [facility] in [facility] in [place]; mild bleeding; seen by Dr Piedra one week ago; beginning of cataract\par 06/22/2022: left orbital skin healed linear scar well healed. No sequela. No oral lesions in past six month\par he had a COVID  positive with home kit ; negative nasal swab. hiccuping given baclofen. No other health problems

## 2022-06-24 NOTE — ASSESSMENT
[Supportive] : Goals of care discussed with patient: Supportive [Palliative Care Plan] : not applicable at this time [FreeTextEntry1] : RODY Montoya is a 62 year old male with a prior history in 2018 of malignant melanoma; he is status post treatment with ipilumab and nivolumab; the physical examination is normal and the CT imaging is not showing new disease. he has stable chest wall lesion related to a fibroma.He has a known history of fibroma\par Blood pressure is controlled and he is sleeping better as he is taking antihypertension medication.\par \par He had a fall with injury to area beneath the left eye. Good control with one stitch at [facility] ER in [place].\par no examination finding of pigmented lesion on skin of legs arms torso; oral cavity examination and the cervical lymph nodes are normal\par  CT scanning with contrast Creatine 1.25 : maxilla facial chest abdomen and pelvis was normal in June 2022\par May decrease frequency of CT scanning now he is at 4 years form diagnosis and wish to reduce radiation exposure. may substitute CT with CXR and ultrasound in future\par reduce hydrocortisone to 10 mg PO BID; renewal of antihypertension medication amlodipine 5 PO daily.  losartan 100 mg PO daily\par Blood testing today and in September at lower cortisone dosing\par RTC in 6 months

## 2022-09-28 ENCOUNTER — LABORATORY RESULT (OUTPATIENT)
Age: 62
End: 2022-09-28

## 2022-10-17 DIAGNOSIS — L30.9 DERMATITIS, UNSPECIFIED: ICD-10-CM

## 2022-10-20 ENCOUNTER — APPOINTMENT (OUTPATIENT)
Dept: DERMATOLOGY | Facility: CLINIC | Age: 62
End: 2022-10-20

## 2022-10-20 DIAGNOSIS — D22.9 MELANOCYTIC NEVI, UNSPECIFIED: ICD-10-CM

## 2022-10-20 PROCEDURE — 99213 OFFICE O/P EST LOW 20 MIN: CPT

## 2022-10-20 NOTE — PHYSICAL EXAM
[Alert] : alert [Oriented x 3] : ~L oriented x 3 [Well Nourished] : well nourished [Conjunctiva Non-injected] : conjunctiva non-injected [No Visual Lymphadenopathy] : no visual  lymphadenopathy [No Clubbing] : no clubbing [No Edema] : no edema [No Bromhidrosis] : no bromhidrosis [No Chromhidrosis] : no chromhidrosis [Full Body Skin Exam Performed] : performed [FreeTextEntry3] : \par \par No LAD head, neck, axilla or groin\par Oral, genital and perianal exam done\par cherry red papules, fleshy buttonhole papules and stuck on brown papules trunk \par

## 2022-10-20 NOTE — HISTORY OF PRESENT ILLNESS
[FreeTextEntry1] : fu metastatic melanoma  [de-identified] : 61 yo M here for above. 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 Seeing onc every 6 mos now. \par \par No night sweats or unintentional weight loss. \par \par LV 4/22 bx showing benign nevus and halo nevus\par \par CT scan in June stable - planning to do imaging less frequently now several yrs out from diagnosis\par \par

## 2022-12-16 ENCOUNTER — OUTPATIENT (OUTPATIENT)
Dept: OUTPATIENT SERVICES | Facility: HOSPITAL | Age: 62
LOS: 1 days | Discharge: HOME | End: 2022-12-16

## 2022-12-16 DIAGNOSIS — C43.39 MALIGNANT MELANOMA OF OTHER PARTS OF FACE: ICD-10-CM

## 2022-12-23 ENCOUNTER — RESULT REVIEW (OUTPATIENT)
Age: 62
End: 2022-12-23

## 2022-12-23 ENCOUNTER — APPOINTMENT (OUTPATIENT)
Dept: HEMATOLOGY ONCOLOGY | Facility: CLINIC | Age: 62
End: 2022-12-23

## 2022-12-23 ENCOUNTER — LABORATORY RESULT (OUTPATIENT)
Age: 62
End: 2022-12-23

## 2022-12-23 VITALS
DIASTOLIC BLOOD PRESSURE: 87 MMHG | SYSTOLIC BLOOD PRESSURE: 141 MMHG | TEMPERATURE: 97.7 F | WEIGHT: 216.05 LBS | BODY MASS INDEX: 29.59 KG/M2 | OXYGEN SATURATION: 96 % | RESPIRATION RATE: 18 BRPM | HEIGHT: 71.81 IN | HEART RATE: 105 BPM

## 2022-12-23 LAB
ALBUMIN SERPL ELPH-MCNC: 4.3 G/DL
ALP BLD-CCNC: 65 U/L
ALT SERPL-CCNC: 16 U/L
ANION GAP SERPL CALC-SCNC: 10 MMOL/L
AST SERPL-CCNC: 16 U/L
BASOPHILS # BLD AUTO: 0.05 K/UL — SIGNIFICANT CHANGE UP (ref 0–0.2)
BASOPHILS NFR BLD AUTO: 0.6 % — SIGNIFICANT CHANGE UP (ref 0–2)
BILIRUB SERPL-MCNC: 0.4 MG/DL
BUN SERPL-MCNC: 18 MG/DL
CALCIUM SERPL-MCNC: 8.9 MG/DL
CHLORIDE SERPL-SCNC: 102 MMOL/L
CO2 SERPL-SCNC: 27 MMOL/L
CORTIS SERPL-MCNC: 19.2 UG/DL
CREAT SERPL-MCNC: 1.16 MG/DL
EGFR: 71 ML/MIN/1.73M2
EOSINOPHIL # BLD AUTO: 0.1 K/UL — SIGNIFICANT CHANGE UP (ref 0–0.5)
EOSINOPHIL NFR BLD AUTO: 1.3 % — SIGNIFICANT CHANGE UP (ref 0–6)
GLUCOSE SERPL-MCNC: 93 MG/DL
HCT VFR BLD CALC: 39.7 % — SIGNIFICANT CHANGE UP (ref 39–50)
HGB BLD-MCNC: 13.8 G/DL — SIGNIFICANT CHANGE UP (ref 13–17)
IMM GRANULOCYTES NFR BLD AUTO: 0.3 % — SIGNIFICANT CHANGE UP (ref 0–0.9)
LYMPHOCYTES # BLD AUTO: 2.3 K/UL — SIGNIFICANT CHANGE UP (ref 1–3.3)
LYMPHOCYTES # BLD AUTO: 29.2 % — SIGNIFICANT CHANGE UP (ref 13–44)
MCHC RBC-ENTMCNC: 31 PG — SIGNIFICANT CHANGE UP (ref 27–34)
MCHC RBC-ENTMCNC: 34.8 G/DL — SIGNIFICANT CHANGE UP (ref 32–36)
MCV RBC AUTO: 89.2 FL — SIGNIFICANT CHANGE UP (ref 80–100)
MONOCYTES # BLD AUTO: 0.67 K/UL — SIGNIFICANT CHANGE UP (ref 0–0.9)
MONOCYTES NFR BLD AUTO: 8.5 % — SIGNIFICANT CHANGE UP (ref 2–14)
NEUTROPHILS # BLD AUTO: 4.73 K/UL — SIGNIFICANT CHANGE UP (ref 1.8–7.4)
NEUTROPHILS NFR BLD AUTO: 60.1 % — SIGNIFICANT CHANGE UP (ref 43–77)
NRBC # BLD: 0 /100 WBCS — SIGNIFICANT CHANGE UP (ref 0–0)
PLATELET # BLD AUTO: 194 K/UL — SIGNIFICANT CHANGE UP (ref 150–400)
POTASSIUM SERPL-SCNC: 3.8 MMOL/L
PROT SERPL-MCNC: 7.1 G/DL
RBC # BLD: 4.45 M/UL — SIGNIFICANT CHANGE UP (ref 4.2–5.8)
RBC # FLD: 13.2 % — SIGNIFICANT CHANGE UP (ref 10.3–14.5)
SODIUM SERPL-SCNC: 138 MMOL/L
TSH SERPL-ACNC: 4.38 UIU/ML
WBC # BLD: 7.87 K/UL — SIGNIFICANT CHANGE UP (ref 3.8–10.5)
WBC # FLD AUTO: 7.87 K/UL — SIGNIFICANT CHANGE UP (ref 3.8–10.5)

## 2022-12-23 PROCEDURE — 99215 OFFICE O/P EST HI 40 MIN: CPT

## 2022-12-23 NOTE — ASSESSMENT
[Supportive] : Goals of care discussed with patient: Supportive [Palliative Care Plan] : not applicable at this time [FreeTextEntry1] : RODY Montoya is a 62 year old male with a prior history in 2018 of malignant melanoma; he is status post treatment with ipilumab and nivolumab; the physical examination is normal and the CT imaging is not showing new disease. he has stable chest wall lesion related to a fibroma.He has a known history of fibroma\par Blood pressure is controlled and he is sleeping better as he is taking antihypertension medication.\par He had a fall with injury to area beneath the left eye. Good control with one stitch at [facility] ER in [place].\par no examination finding of pigmented lesion on skin of legs arms torso; oral cavity examination and the cervical lymph nodes are normal\par  CT scanning with contrast Creatine 1.25 : maxilla facial chest abdomen and pelvis was normal in June 2022\par May decrease frequency of CT scanning now he is at 4 years form diagnosis and wish to reduce radiation exposure. may substitute CT with CXR and ultrasound in future\par reduce hydrocortisone to 10 mg PO BID; renewal of antihypertension medication amlodipine 5 PO daily.  losartan 100 mg PO daily\par Blood testing today and in September at lower cortisone dosing\par RTC in 6 months\par 12/23/2022: now 52 months from the diagnosis and clinically in remission. Review of June CT 2022  scanning ; no new area compatible with melanoma diagnosis; he has a subcutaneous chest mass stable compatible with is prior diagnosis of fibroma. He is still requiring hydrocortisone therapy and serum cortisone levels in fluctuation in past. Today using PM cortisone level , note good levels while on hydrocortisone. Normal thyroid levels and good control of blood pressure. renewal of hydrocortisone, blood pressure and antifungal medication\par Physical examination is normal; no nodularity in neck or oral cavity . no interim health issues .He has CT scans requested for maxilla facial region , Chest abdomen and pelvis in June 17 2023. No brain imaging planned as he had negative studies in 2018 and has a normal neurological examination. return in last week of June 2023. Discussion of his planned trip to home and family to [facility]

## 2022-12-23 NOTE — HISTORY OF PRESENT ILLNESS
[Disease: _____________________] : Disease: [unfilled] [T: ___] : T[unfilled] [N: ___] : N[unfilled] [M: ___] : M[unfilled] [AJCC Stage: ____] : AJCC Stage: [unfilled] [Treatment Protocol] : Treatment Protocol [0 - No Distress] : Distress Level: 0 [100: Normal, no complaints, no evidence of disease.] : 100: Normal, no complaints, no evidence of disease. [ECOG Performance Status: 0 - Fully active, able to carry on all pre-disease performance without restriction] : Performance Status: 0 - Fully active, able to carry on all pre-disease performance without restriction [Date: ____________] : Patient's last distress assessment performed on [unfilled]. [de-identified] : This 62 year old male describes a mass in the right lower jaw. The patient has a history of dental discomfort and he had a 3rd molar resection (he describes it as a wisdom tooth in the last week of June 2018. Approximately 4 weeks later he noted persistent swelling in the area and he was given a course of antibiotic treatment beneath the right jaw extending to the right buccal area in the last week of July 2018. After one week there was no definite shrinkage, and he went to an urgent care center; a CXR was requested and the right jaw mass was noted; he was referred to [facility] ER  and  CT scan was requested of the chest and ST of the neck and jaw.\par The  CT of the chest showed a  2.5 cm mass of the left lingula .He was referred for follow up by an oral surgeon Dr Jimenez in [facility] who did an incision and drainage and biopsies of the  mandible on August 18 2018. Finding revealed malignant melanoma as Dr Jimenez had sent the slide to  ERIKA [facility]IA  for review by Dr Snyder\par He was seen by a primary care physician KRISHAN Peralta and a pulmonologist Tana Scott.\par A CT/PET performed on September 3 2018; the scan was described as showing a FG G avid mass in the right mandible with an S U V of  and an abnormality in the left  lung lingula that measures 2.0 X 3.1 cm and is  F D G avid S U V 15.5 ; there is a second left upper lobe nodule that is small and it was  non FD G avid; he has a right leg intramuscular lesion with low F D G  uptake. Intramuscular lesions and chest wall lesions are thought to be neurofibroma and are not F D G avid. He has a right renal cyst. \par The patient has no history of skin malignant melanoma. He has had a recent assessment  by dermatology of lesions of the right arm and left bridge of nose. No diagnosis of malignancy was made.\par There is a past medical history of resection of neurofibromas; He dose not know of neurofibromas in his past family history including his natural mother and his natural father; he is adopted.  [de-identified] : metastatic melanoma [de-identified] : tumor cells positive for S 100, Melan A ,SOX 10, HMG 45.\par negative for T T F 1, A E 1.3.,p 40\par positive c kit [FreeTextEntry1] : nivolumab 480 mg IV monthly (twelfth month of therapy; s/p combination ipilimumab- nivolumab followed by 9 months single agent nivolumab) last treatment September 2019 [de-identified] : Patient fell three weeks ago; 10/26/2021; treated at [facility] in [facility] ER in [place]; mild bleeding; seen by Dr Piedra one week ago; beginning of cataract\par 06/22/2022: left orbital skin healed linear scar well healed. No sequela. No oral lesions in past six month\par he had a COVID  positive with home kit ; negative nasal swab. hiccuping given baclofen. No other health problems

## 2022-12-23 NOTE — RESULTS/DATA
[FreeTextEntry1] : normal CBC review of prior normal CMP; note lower cortisol level 11/2021\par CT maxilla and lung are stable;\par 12/23/2022 WBC 7.87 HGB 13.8  000

## 2023-03-13 NOTE — HISTORY OF PRESENT ILLNESS
177.8 [Disease: _____________________] : Disease: [unfilled] [T: ___] : T[unfilled] [N: ___] : N[unfilled] [M: ___] : M[unfilled] [AJCC Stage: ____] : AJCC Stage: [unfilled] [Treatment Protocol] : Treatment Protocol [Cardiovascular] : Cardiovascular [Constitutional] : Constitutional [ENT] : ENT [Dermatologic] : Dermatologic [Endocrine] : Endocrine [Gastrointestinal] : Gastrointestinal [Genitourinary] : Genitourinary [Gynecologic] : Gynecologic [Infectious] : Infectious [Musculoskeletal] : Musculoskeletal [Neurologic] : Neurologic [Pain] : Pain [Pulmonary] : Pulmonary [Hematologic] : Hematologic [Date: ____________] : Patient's last distress assessment performed on [unfilled]. [1 - Distress Level] : Distress Level: 1 [de-identified] : This 58 year old male describes a mas in the right lower jaw. The patient has a history of dental discomfort and he had a 3rd molar resection (he describes it as a wisdom tooth in the last week of June 2018. Approximately 4 weeks later he noted persistent swelling in the area and he was given a course of antibiotic treatment beneath the right jaw extending to the right buccal area in the last week of July 2018. After one week there was no definite shrinkage, and he went to an urgent care center; a CXR was requested and the right jaw mass was noted; he was referred to [facility] ER  and  CT scan was requested of the chest and ST of the neck and jaw.\par The  CT of the chest showed a  2.5 cm mass of the left lingula .He was referred for follow up by an oral surgeon Dr Jimenez in [facility] who did an incision and drainage and biopsies of the  mandible on August 18 2018. Finding revealed malignant melanoma as Dr Jimenez had sent the slide to  ERIKA [facility]ARCIA  for review by Dr Snyder\par He was seen by a primary care physician KRISHAN Peralta and a pulmonologist Tana Scott.\par A CT/PET performed on September 3 2018; the scan was described as showing a FG G avid mass in the right mandible with an S U V of  and an abnormality in the left  lung lingula that measures 2.0 X 3.1 cm and is  F D G avid S U V 15.5 ; there is a second left upper lobe nodule that is small and it was  non FD G avid; he has a right leg intramuscular lesion with low F D G  uptake. Intramuscular lesions and chest wall lesions are thought to be neurofibroma and are not F D G avid. He has a right renal cyst. \par The patient has no history of skin malignant melanoma. He has had a recent assessment  by dermatology of lesions of the right arm and left bridge of nose. No diagnosis of malignancy was made.\par There is a past medical history of resection of neurofibromas; He dose not know of neurofibromas in his past family history including his natural mother and his natural father; he is adopted.  [de-identified] : metastatic melanoma [de-identified] : tumor cells positive for S 100, Melan A ,SOX 10, HMG 45.\par negative for T T F 1, A E 1.3.,p 40\par positive c kit [FreeTextEntry1] : s/p 4 cycles ipilimumab /nivolumab [de-identified] : Patient presented to the office his imaging study results from last week.

## 2023-03-26 ENCOUNTER — NON-APPOINTMENT (OUTPATIENT)
Age: 63
End: 2023-03-26

## 2023-04-17 ENCOUNTER — NON-APPOINTMENT (OUTPATIENT)
Age: 63
End: 2023-04-17

## 2023-04-17 ENCOUNTER — APPOINTMENT (OUTPATIENT)
Dept: DERMATOLOGY | Facility: CLINIC | Age: 63
End: 2023-04-17
Payer: COMMERCIAL

## 2023-04-17 DIAGNOSIS — D18.01 HEMANGIOMA OF SKIN AND SUBCUTANEOUS TISSUE: ICD-10-CM

## 2023-04-17 DIAGNOSIS — D36.10 BENIGN NEOPLASM OF PERIPHERAL NERVES AND AUTONOMIC NERVOUS SYSTEM, UNSPECIFIED: ICD-10-CM

## 2023-04-17 PROCEDURE — 99213 OFFICE O/P EST LOW 20 MIN: CPT | Mod: 25

## 2023-04-17 PROCEDURE — 17000 DESTRUCT PREMALG LESION: CPT

## 2023-04-17 NOTE — PHYSICAL EXAM
[Alert] : alert [Oriented x 3] : ~L oriented x 3 [Well Nourished] : well nourished [Conjunctiva Non-injected] : conjunctiva non-injected [No Visual Lymphadenopathy] : no visual  lymphadenopathy [No Clubbing] : no clubbing [No Edema] : no edema [No Bromhidrosis] : no bromhidrosis [No Chromhidrosis] : no chromhidrosis [Full Body Skin Exam Performed] : performed [FreeTextEntry3] : \par \par No LAD head, neck, axilla or groin\par Oral, genital and perianal exam done\par cherry red papules, fleshy buttonhole papules and stuck on brown papules trunk \par \par gritty pink papule L cheek

## 2023-04-17 NOTE — HISTORY OF PRESENT ILLNESS
[FreeTextEntry1] : fu metastatic melanoma  [de-identified] : 61 yo M here for above. 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 Seeing onc every 6 mos now. \par \par No night sweats or unintentional weight loss. No new or changing spots.

## 2023-06-01 ENCOUNTER — RESULT REVIEW (OUTPATIENT)
Age: 63
End: 2023-06-01

## 2023-06-12 ENCOUNTER — APPOINTMENT (OUTPATIENT)
Dept: CT IMAGING | Facility: IMAGING CENTER | Age: 63
End: 2023-06-12
Payer: COMMERCIAL

## 2023-06-12 ENCOUNTER — OUTPATIENT (OUTPATIENT)
Dept: OUTPATIENT SERVICES | Facility: HOSPITAL | Age: 63
LOS: 1 days | End: 2023-06-12
Payer: COMMERCIAL

## 2023-06-12 DIAGNOSIS — C79.89 SECONDARY MALIGNANT NEOPLASM OF OTHER SPECIFIED SITES: ICD-10-CM

## 2023-06-12 PROCEDURE — 74177 CT ABD & PELVIS W/CONTRAST: CPT | Mod: 26

## 2023-06-12 PROCEDURE — 74177 CT ABD & PELVIS W/CONTRAST: CPT

## 2023-06-12 PROCEDURE — 70487 CT MAXILLOFACIAL W/DYE: CPT | Mod: 26

## 2023-06-12 PROCEDURE — 71260 CT THORAX DX C+: CPT | Mod: 26

## 2023-06-12 PROCEDURE — 70487 CT MAXILLOFACIAL W/DYE: CPT

## 2023-06-12 PROCEDURE — 71260 CT THORAX DX C+: CPT

## 2023-06-13 ENCOUNTER — OUTPATIENT (OUTPATIENT)
Dept: OUTPATIENT SERVICES | Facility: HOSPITAL | Age: 63
LOS: 1 days | Discharge: HOME | End: 2023-06-13

## 2023-06-13 DIAGNOSIS — C43.39 MALIGNANT MELANOMA OF OTHER PARTS OF FACE: ICD-10-CM

## 2023-06-14 ENCOUNTER — RX RENEWAL (OUTPATIENT)
Age: 63
End: 2023-06-14

## 2023-06-23 ENCOUNTER — RESULT REVIEW (OUTPATIENT)
Age: 63
End: 2023-06-23

## 2023-06-23 ENCOUNTER — APPOINTMENT (OUTPATIENT)
Dept: HEMATOLOGY ONCOLOGY | Facility: CLINIC | Age: 63
End: 2023-06-23
Payer: COMMERCIAL

## 2023-06-23 VITALS
WEIGHT: 224.87 LBS | BODY MASS INDEX: 30.79 KG/M2 | RESPIRATION RATE: 18 BRPM | HEART RATE: 100 BPM | OXYGEN SATURATION: 98 % | SYSTOLIC BLOOD PRESSURE: 145 MMHG | DIASTOLIC BLOOD PRESSURE: 95 MMHG | TEMPERATURE: 97.6 F | HEIGHT: 71.81 IN

## 2023-06-23 DIAGNOSIS — N40.0 BENIGN PROSTATIC HYPERPLASIA WITHOUT LOWER URINARY TRACT SYMPMS: ICD-10-CM

## 2023-06-23 LAB
BASOPHILS # BLD AUTO: 0.05 K/UL — SIGNIFICANT CHANGE UP (ref 0–0.2)
BASOPHILS NFR BLD AUTO: 0.9 % — SIGNIFICANT CHANGE UP (ref 0–2)
EOSINOPHIL # BLD AUTO: 0.02 K/UL — SIGNIFICANT CHANGE UP (ref 0–0.5)
EOSINOPHIL NFR BLD AUTO: 0.4 % — SIGNIFICANT CHANGE UP (ref 0–6)
HCT VFR BLD CALC: 40.5 % — SIGNIFICANT CHANGE UP (ref 39–50)
HGB BLD-MCNC: 14 G/DL — SIGNIFICANT CHANGE UP (ref 13–17)
IMM GRANULOCYTES NFR BLD AUTO: 0.4 % — SIGNIFICANT CHANGE UP (ref 0–0.9)
LYMPHOCYTES # BLD AUTO: 1.32 K/UL — SIGNIFICANT CHANGE UP (ref 1–3.3)
LYMPHOCYTES # BLD AUTO: 24.5 % — SIGNIFICANT CHANGE UP (ref 13–44)
MCHC RBC-ENTMCNC: 31.1 PG — SIGNIFICANT CHANGE UP (ref 27–34)
MCHC RBC-ENTMCNC: 34.6 G/DL — SIGNIFICANT CHANGE UP (ref 32–36)
MCV RBC AUTO: 90 FL — SIGNIFICANT CHANGE UP (ref 80–100)
MONOCYTES # BLD AUTO: 0.3 K/UL — SIGNIFICANT CHANGE UP (ref 0–0.9)
MONOCYTES NFR BLD AUTO: 5.6 % — SIGNIFICANT CHANGE UP (ref 2–14)
NEUTROPHILS # BLD AUTO: 3.67 K/UL — SIGNIFICANT CHANGE UP (ref 1.8–7.4)
NEUTROPHILS NFR BLD AUTO: 68.2 % — SIGNIFICANT CHANGE UP (ref 43–77)
NRBC # BLD: 0 /100 WBCS — SIGNIFICANT CHANGE UP (ref 0–0)
PLATELET # BLD AUTO: 203 K/UL — SIGNIFICANT CHANGE UP (ref 150–400)
RBC # BLD: 4.5 M/UL — SIGNIFICANT CHANGE UP (ref 4.2–5.8)
RBC # FLD: 12.8 % — SIGNIFICANT CHANGE UP (ref 10.3–14.5)
WBC # BLD: 5.38 K/UL — SIGNIFICANT CHANGE UP (ref 3.8–10.5)
WBC # FLD AUTO: 5.38 K/UL — SIGNIFICANT CHANGE UP (ref 3.8–10.5)

## 2023-06-23 PROCEDURE — 99215 OFFICE O/P EST HI 40 MIN: CPT

## 2023-06-23 NOTE — ASSESSMENT
[Supportive] : Goals of care discussed with patient: Supportive [Palliative Care Plan] : not applicable at this time [FreeTextEntry1] : RODY Montoya is a 62 year old male with a prior history in 2018 of malignant melanoma; he is status post treatment with ipilumab and nivolumab; the physical examination is normal and the CT imaging is not showing new disease. he has stable chest wall lesion related to a fibroma.He has a known history of fibroma\par Blood pressure is controlled and he is sleeping better as he is taking antihypertension medication.\par He had a fall with injury to area beneath the left eye. Good control with one stitch at [facility] ER in [place].\par no examination finding of pigmented lesion on skin of legs arms torso; oral cavity examination and the cervical lymph nodes are normal\par  CT scanning with contrast Creatine 1.25 : maxilla facial chest abdomen and pelvis was normal in June 2022\par May decrease frequency of CT scanning now he is at 4 years form diagnosis and wish to reduce radiation exposure. may substitute CT with CXR and ultrasound in future\par reduce hydrocortisone to 10 mg PO BID; renewal of antihypertension medication amlodipine 5 PO daily.  losartan 100 mg PO daily\par Blood testing today and in September at lower cortisone dosing\par RTC in 6 months\par 12/23/2022: now 52 months from the diagnosis and clinically in remission. Review of June CT 2022  scanning ; no new area compatible with melanoma diagnosis; he has a subcutaneous chest mass stable compatible with is prior diagnosis of fibroma. He is still requiring hydrocortisone therapy and serum cortisone levels in fluctuation in past. Today using PM cortisone level , note good levels while on hydrocortisone. Normal thyroid levels and good control of blood pressure. renewal of hydrocortisone, blood pressure and antifungal medication\par Physical examination is normal; no nodularity in neck or oral cavity . no interim health issues .He has CT scans requested for maxilla facial region , Chest abdomen and pelvis in June 17 2023. No brain imaging planned as he had negative studies in 2018 and has a normal neurological examination. return in last week of June 2023. Discussion of his planned trip to home and family to [facility].\par 06/23/2023 Month 58 since diagnosis and treatment metastatic mucosal melanoma.  He has been feeling well; note mild elevation of the cortisol level in December 2023 without symptoms. Dose reduction in hydrocortisone placed to  10 mg PO BID; The patient  has an enlarged prostate and he has requested PSA which will be obtained with CBC and CMP today. The patietn has no lymphadenopathy and oral examination right and left mandible is normal. 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 Medication including 90 supply of amlodipine 10 and losartan 100 mg PO prescribed for Matt as he does not have a primary care MD. RTC in 6 months for follow up

## 2023-06-23 NOTE — REVIEW OF SYSTEMS
[Negative] : Allergic/Immunologic [Fever] : no fever [Chills] : no chills [Night Sweats] : no night sweats [Fatigue] : no fatigue [Recent Change In Weight] : ~T no recent weight change [Eye Pain] : no eye pain [Red Eyes] : eyes not red [Dry Eyes] : no dryness of the eyes [Vision Problems] : no vision problems [Dysphagia] : no dysphagia [Loss of Hearing] : no loss of hearing [Nosebleeds] : no nosebleeds [Hoarseness] : no hoarseness [Odynophagia] : no odynophagia [Mucosal Pain] : no mucosal pain [Chest Pain] : no chest pain [Palpitations] : no palpitations [Leg Claudication] : no intermittent leg claudication [Lower Ext Edema] : no lower extremity edema [Shortness Of Breath] : no shortness of breath [Wheezing] : no wheezing [Cough] : no cough [SOB on Exertion] : no shortness of breath during exertion [Abdominal Pain] : no abdominal pain [Vomiting] : no vomiting [Constipation] : no constipation [Dysuria] : no dysuria [Incontinence] : no incontinence [Joint Pain] : no joint pain [Joint Stiffness] : no joint stiffness [Muscle Pain] : no muscle pain [Skin Rash] : no skin rash [Skin Wound] : no skin wound [Confused] : no confusion [Dizziness] : no dizziness [Fainting] : no fainting [Difficulty Walking] : no difficulty walking [Suicidal] : not suicidal [Insomnia] : no insomnia [Anxiety] : no anxiety [Depression] : no depression [Proptosis] : no proptosis [Hot Flashes] : no hot flashes [Muscle Weakness] : no muscle weakness [Deepening Of The Voice] : no deepening of the voice [Easy Bleeding] : no tendency for easy bleeding [Easy Bruising] : no tendency for easy bruising [Swollen Glands] : no swollen glands

## 2023-06-23 NOTE — HISTORY OF PRESENT ILLNESS
[Disease: _____________________] : Disease: [unfilled] [T: ___] : T[unfilled] [N: ___] : N[unfilled] [M: ___] : M[unfilled] [AJCC Stage: ____] : AJCC Stage: [unfilled] [Treatment Protocol] : Treatment Protocol [Date: ____________] : Patient's last distress assessment performed on [unfilled]. [0 - No Distress] : Distress Level: 0 [100: Normal, no complaints, no evidence of disease.] : 100: Normal, no complaints, no evidence of disease. [ECOG Performance Status: 0 - Fully active, able to carry on all pre-disease performance without restriction] : Performance Status: 0 - Fully active, able to carry on all pre-disease performance without restriction [de-identified] : This 62 year old male describes a mass in the right lower jaw. The patient has a history of dental discomfort and he had a 3rd molar resection (he describes it as a wisdom tooth in the last week of June 2018. Approximately 4 weeks later he noted persistent swelling in the area and he was given a course of antibiotic treatment beneath the right jaw extending to the right buccal area in the last week of July 2018. After one week there was no definite shrinkage, and he went to an urgent care center; a CXR was requested and the right jaw mass was noted; he was referred to [facility] ER  and  CT scan was requested of the chest and ST of the neck and jaw.\par The  CT of the chest showed a  2.5 cm mass of the left lingula .He was referred for follow up by an oral surgeon Dr Jimenez in [facility] who did an incision and drainage and biopsies of the  mandible on August 18 2018. Finding revealed malignant melanoma as Dr Jimenez had sent the slide to  [facility]IKA [facility]ARCIA  for review by Dr Snyder\par He was seen by a primary care physician KRISHAN Peralta and a pulmonologist Tana Scott.\par A CT/PET performed on September 3 2018; the scan was described as showing a FG G avid mass in the right mandible with an S U V of  and an abnormality in the left  lung lingula that measures 2.0 X 3.1 cm and is  F D G avid S U V 15.5 ; there is a second left upper lobe nodule that is small and it was  non FD G avid; he has a right leg intramuscular lesion with low F D G  uptake. Intramuscular lesions and chest wall lesions are thought to be neurofibroma and are not F D G avid. He has a right renal cyst. \par The patient has no history of skin malignant melanoma. He has had a recent assessment  by dermatology of lesions of the right arm and left bridge of nose. No diagnosis of malignancy was made.\par There is a past medical history of resection of neurofibromas; He dose not know of neurofibromas in his past family history including his natural mother and his natural father; he is adopted.  [de-identified] : metastatic melanoma [de-identified] : tumor cells positive for S 100, Melan A ,SOX 10, HMG 45.\par negative for T T F 1, A E 1.3.,p 40\par positive c kit [FreeTextEntry1] : nivolumab 480 mg IV monthly (twelfth month of therapy; s/p combination ipilimumab- nivolumab followed by 9 months single agent nivolumab) last treatment September 2019 [de-identified] : Patient fell three weeks ago; 10/26/2021; treated at [facility] in [facility] ER in [place]; mild bleeding; seen by Dr Piedra one week ago; beginning of cataract\par 06/22/2022: left orbital skin healed linear scar well healed. No sequela. No oral lesions in past six month\par he had a COVID  positive with home kit ; negative nasal swab. hiccuping given baclofen. No other health problems;\par 06/23/2023 seen at 6 month interval. Now 57 months from diagnosis of right gingival mucosal melanoma;  no difficulty in swallowing or other issues related to dental. He had the extraction of 2 wisdom teeth in past 6 months; job interviews for temporary employment;

## 2023-06-26 LAB
ALBUMIN SERPL ELPH-MCNC: 4.4 G/DL
ALP BLD-CCNC: 63 U/L
ALT SERPL-CCNC: 13 U/L
ANION GAP SERPL CALC-SCNC: 9 MMOL/L
AST SERPL-CCNC: 15 U/L
BILIRUB SERPL-MCNC: 0.4 MG/DL
BUN SERPL-MCNC: 19 MG/DL
CALCIUM SERPL-MCNC: 8.9 MG/DL
CHLORIDE SERPL-SCNC: 106 MMOL/L
CO2 SERPL-SCNC: 27 MMOL/L
CORTIS SERPL-MCNC: 3.1 UG/DL
CREAT SERPL-MCNC: 1.19 MG/DL
EGFR: 69 ML/MIN/1.73M2
GLUCOSE SERPL-MCNC: 98 MG/DL
POTASSIUM SERPL-SCNC: 4.2 MMOL/L
PROT SERPL-MCNC: 7.1 G/DL
PSA SERPL-MCNC: 2.58 NG/ML
SODIUM SERPL-SCNC: 141 MMOL/L

## 2023-10-13 NOTE — END OF VISIT
[>50% of Time Spent on Counseling and Coordination of Care for  ___] : Greater than 50% of the encounter time was spent on counseling and coordination of care for [unfilled] [Time Spent: ___ minutes] : I have spent [unfilled] minutes of face to face time with the patient Spine appears normal, range of motion is not limited, no muscle or joint tenderness

## 2023-12-04 ENCOUNTER — APPOINTMENT (OUTPATIENT)
Dept: DERMATOLOGY | Facility: CLINIC | Age: 63
End: 2023-12-04
Payer: COMMERCIAL

## 2023-12-04 DIAGNOSIS — K12.0 RECURRENT ORAL APHTHAE: ICD-10-CM

## 2023-12-04 PROCEDURE — 99214 OFFICE O/P EST MOD 30 MIN: CPT

## 2023-12-04 RX ORDER — TRIAMCINOLONE ACETONIDE 1 MG/G
0.1 PASTE DENTAL
Qty: 1 | Refills: 2 | Status: ACTIVE | COMMUNITY
Start: 2023-12-04 | End: 1900-01-01

## 2023-12-14 ENCOUNTER — OUTPATIENT (OUTPATIENT)
Dept: OUTPATIENT SERVICES | Facility: HOSPITAL | Age: 63
LOS: 1 days | Discharge: HOME | End: 2023-12-14

## 2023-12-14 DIAGNOSIS — C43.39 MALIGNANT MELANOMA OF OTHER PARTS OF FACE: ICD-10-CM

## 2023-12-15 ENCOUNTER — APPOINTMENT (OUTPATIENT)
Dept: HEMATOLOGY ONCOLOGY | Facility: CLINIC | Age: 63
End: 2023-12-15
Payer: COMMERCIAL

## 2023-12-15 ENCOUNTER — RESULT REVIEW (OUTPATIENT)
Age: 63
End: 2023-12-15

## 2023-12-15 VITALS
RESPIRATION RATE: 16 BRPM | SYSTOLIC BLOOD PRESSURE: 137 MMHG | WEIGHT: 220.31 LBS | OXYGEN SATURATION: 95 % | TEMPERATURE: 97.2 F | HEIGHT: 71.81 IN | DIASTOLIC BLOOD PRESSURE: 88 MMHG | BODY MASS INDEX: 30.17 KG/M2 | HEART RATE: 98 BPM

## 2023-12-15 LAB
ALBUMIN SERPL ELPH-MCNC: 4.4 G/DL
ALP BLD-CCNC: 63 U/L
ALT SERPL-CCNC: 14 U/L
ANION GAP SERPL CALC-SCNC: 11 MMOL/L
AST SERPL-CCNC: 20 U/L
BASOPHILS # BLD AUTO: 0.05 K/UL — SIGNIFICANT CHANGE UP (ref 0–0.2)
BASOPHILS # BLD AUTO: 0.05 K/UL — SIGNIFICANT CHANGE UP (ref 0–0.2)
BASOPHILS NFR BLD AUTO: 0.9 % — SIGNIFICANT CHANGE UP (ref 0–2)
BASOPHILS NFR BLD AUTO: 0.9 % — SIGNIFICANT CHANGE UP (ref 0–2)
BILIRUB SERPL-MCNC: 0.4 MG/DL
BUN SERPL-MCNC: 16 MG/DL
CALCIUM SERPL-MCNC: 9 MG/DL
CHLORIDE SERPL-SCNC: 102 MMOL/L
CO2 SERPL-SCNC: 25 MMOL/L
CORTIS SERPL-MCNC: 1.4 UG/DL
CREAT SERPL-MCNC: 1.22 MG/DL
EGFR: 67 ML/MIN/1.73M2
EOSINOPHIL # BLD AUTO: 0.06 K/UL — SIGNIFICANT CHANGE UP (ref 0–0.5)
EOSINOPHIL # BLD AUTO: 0.06 K/UL — SIGNIFICANT CHANGE UP (ref 0–0.5)
EOSINOPHIL NFR BLD AUTO: 1.1 % — SIGNIFICANT CHANGE UP (ref 0–6)
EOSINOPHIL NFR BLD AUTO: 1.1 % — SIGNIFICANT CHANGE UP (ref 0–6)
GLUCOSE SERPL-MCNC: 95 MG/DL
HCT VFR BLD CALC: 40.3 % — SIGNIFICANT CHANGE UP (ref 39–50)
HCT VFR BLD CALC: 40.3 % — SIGNIFICANT CHANGE UP (ref 39–50)
HGB BLD-MCNC: 14.3 G/DL — SIGNIFICANT CHANGE UP (ref 13–17)
HGB BLD-MCNC: 14.3 G/DL — SIGNIFICANT CHANGE UP (ref 13–17)
IMM GRANULOCYTES NFR BLD AUTO: 0.4 % — SIGNIFICANT CHANGE UP (ref 0–0.9)
IMM GRANULOCYTES NFR BLD AUTO: 0.4 % — SIGNIFICANT CHANGE UP (ref 0–0.9)
LYMPHOCYTES # BLD AUTO: 2.06 K/UL — SIGNIFICANT CHANGE UP (ref 1–3.3)
LYMPHOCYTES # BLD AUTO: 2.06 K/UL — SIGNIFICANT CHANGE UP (ref 1–3.3)
LYMPHOCYTES # BLD AUTO: 37.3 % — SIGNIFICANT CHANGE UP (ref 13–44)
LYMPHOCYTES # BLD AUTO: 37.3 % — SIGNIFICANT CHANGE UP (ref 13–44)
MCHC RBC-ENTMCNC: 31.5 PG — SIGNIFICANT CHANGE UP (ref 27–34)
MCHC RBC-ENTMCNC: 31.5 PG — SIGNIFICANT CHANGE UP (ref 27–34)
MCHC RBC-ENTMCNC: 35.5 G/DL — SIGNIFICANT CHANGE UP (ref 32–36)
MCHC RBC-ENTMCNC: 35.5 G/DL — SIGNIFICANT CHANGE UP (ref 32–36)
MCV RBC AUTO: 88.8 FL — SIGNIFICANT CHANGE UP (ref 80–100)
MCV RBC AUTO: 88.8 FL — SIGNIFICANT CHANGE UP (ref 80–100)
MONOCYTES # BLD AUTO: 0.51 K/UL — SIGNIFICANT CHANGE UP (ref 0–0.9)
MONOCYTES # BLD AUTO: 0.51 K/UL — SIGNIFICANT CHANGE UP (ref 0–0.9)
MONOCYTES NFR BLD AUTO: 9.2 % — SIGNIFICANT CHANGE UP (ref 2–14)
MONOCYTES NFR BLD AUTO: 9.2 % — SIGNIFICANT CHANGE UP (ref 2–14)
NEUTROPHILS # BLD AUTO: 2.83 K/UL — SIGNIFICANT CHANGE UP (ref 1.8–7.4)
NEUTROPHILS # BLD AUTO: 2.83 K/UL — SIGNIFICANT CHANGE UP (ref 1.8–7.4)
NEUTROPHILS NFR BLD AUTO: 51.1 % — SIGNIFICANT CHANGE UP (ref 43–77)
NEUTROPHILS NFR BLD AUTO: 51.1 % — SIGNIFICANT CHANGE UP (ref 43–77)
NRBC # BLD: 0 /100 WBCS — SIGNIFICANT CHANGE UP (ref 0–0)
NRBC # BLD: 0 /100 WBCS — SIGNIFICANT CHANGE UP (ref 0–0)
PLATELET # BLD AUTO: 214 K/UL — SIGNIFICANT CHANGE UP (ref 150–400)
PLATELET # BLD AUTO: 214 K/UL — SIGNIFICANT CHANGE UP (ref 150–400)
POTASSIUM SERPL-SCNC: 4.1 MMOL/L
PROT SERPL-MCNC: 7.1 G/DL
PSA SERPL-MCNC: 2.4 NG/ML
RBC # BLD: 4.54 M/UL — SIGNIFICANT CHANGE UP (ref 4.2–5.8)
RBC # BLD: 4.54 M/UL — SIGNIFICANT CHANGE UP (ref 4.2–5.8)
RBC # FLD: 12.6 % — SIGNIFICANT CHANGE UP (ref 10.3–14.5)
RBC # FLD: 12.6 % — SIGNIFICANT CHANGE UP (ref 10.3–14.5)
SODIUM SERPL-SCNC: 138 MMOL/L
WBC # BLD: 5.53 K/UL — SIGNIFICANT CHANGE UP (ref 3.8–10.5)
WBC # BLD: 5.53 K/UL — SIGNIFICANT CHANGE UP (ref 3.8–10.5)
WBC # FLD AUTO: 5.53 K/UL — SIGNIFICANT CHANGE UP (ref 3.8–10.5)
WBC # FLD AUTO: 5.53 K/UL — SIGNIFICANT CHANGE UP (ref 3.8–10.5)

## 2023-12-15 PROCEDURE — 99215 OFFICE O/P EST HI 40 MIN: CPT

## 2023-12-15 RX ORDER — CLOTRIMAZOLE 10 MG/G
1 CREAM TOPICAL 3 TIMES DAILY
Qty: 1 | Refills: 0 | Status: DISCONTINUED | COMMUNITY
Start: 2023-12-15 | End: 2023-12-15

## 2023-12-15 RX ORDER — LOSARTAN POTASSIUM 100 MG/1
100 TABLET, FILM COATED ORAL DAILY
Qty: 90 | Refills: 3 | Status: ACTIVE | COMMUNITY
Start: 2019-05-02 | End: 1900-01-01

## 2023-12-15 NOTE — HISTORY OF PRESENT ILLNESS
[Disease: _____________________] : Disease: [unfilled] [T: ___] : T[unfilled] [N: ___] : N[unfilled] [M: ___] : M[unfilled] [AJCC Stage: ____] : AJCC Stage: [unfilled] [Treatment Protocol] : Treatment Protocol [Date: ____________] : Patient's last distress assessment performed on [unfilled]. [0 - No Distress] : Distress Level: 0 [100: Normal, no complaints, no evidence of disease.] : 100: Normal, no complaints, no evidence of disease. [ECOG Performance Status: 0 - Fully active, able to carry on all pre-disease performance without restriction] : Performance Status: 0 - Fully active, able to carry on all pre-disease performance without restriction [de-identified] : This 63-year-old male describes a mass in the right lower jaw. The patient has a history of dental discomfort, and he had a 3rd molar resection (he describes it as a wisdom tooth in the last week of June 2018. Approximately 4 weeks later he noted persistent swelling in the area, and he was given a course of antibiotic treatment beneath the right jaw extending to the right buccal area in the last week of July 2018. After one week there was no definite shrinkage, and he went to an urgent care center; a CXR was requested and the right jaw mass was noted; he was referred to [facility] ER and  CT scan was requested of the chest and ST of the neck and jaw. The  CT of the chest showed a  2.5 cm mass of the left lingula .He was referred for follow up by an oral surgeon Dr Jimenez in [facility] who did an incision and drainage and biopsies of the  mandible on August 18 2018. Finding revealed malignant melanoma as Dr Jimenez had sent the slide to  ERIKA GARCIA  for review by Dr Snyder He was seen by a primary care physician KRISHAN Peralta and a pulmonologist Tana Scott. A CT/PET performed on September 3 2018; the scan was described as showing a FG G avid mass in the right mandible with an S U V of  and an abnormality in the left  lung lingula that measures 2.0 X 3.1 cm and is  F D G avid S U V 15.5 ; there is a second left upper lobe nodule that is small and it was  non FD G avid; he has a right leg intramuscular lesion with low F D G  uptake. Intramuscular lesions and chest wall lesions are thought to be neurofibroma and are not F D G avid. He has a right renal cyst.  The patient has no history of skin malignant melanoma. He has had a recent assessment  by dermatology of lesions of the right arm and left bridge of nose. No diagnosis of malignancy was made. There is a past medical history of resection of neurofibromas; He dose not know of neurofibromas in his past family history including his natural mother and his natural father; he is adopted.  [de-identified] : metastatic melanoma [de-identified] : tumor cells positive for S 100, Melan A ,SOX 10, HMG 45.\par  negative for T T F 1, A E 1.3.,p 40\par  positive c kit [FreeTextEntry1] : nivolumab 480 mg IV monthly (twelfth month of therapy; s/p combination ipilimumab- nivolumab followed by 9 months single agent nivolumab) last treatment September 2019 [de-identified] : Patient fell three weeks ago; 10/26/2021; treated at [facility] in [facility] ER in [place]; mild bleeding; seen by Dr Piedra one week ago; beginning of cataract 06/22/2022: left orbital skin healed linear scar well healed. No sequela. No oral lesions in past six month he had a COVID positive with home kit; negative nasal swab. hiccupping given baclofen. No other health problems. 06/23/2023 seen at 6-month interval. Now 57 months from diagnosis of right gingival mucosal melanoma; no difficulty in swallowing or other issues related to dental. He had the extraction of 2 wisdom teeth in past 6 months: job interviews for temporary employment. 12/15/2023 He has had a good health history in the past six months. no lumps in jaw, no shortness of breath or pulmonary infection. No transfusion. no fever He bloods pressure has been under control.

## 2023-12-15 NOTE — ASSESSMENT
[Supportive] : Goals of care discussed with patient: Supportive [Palliative Care Plan] : not applicable at this time [FreeTextEntry1] : 63-year-old male with a history of metastatic melanoma.  Diagnosis July 2018, he is 66 months from diagnosis and no evidence of recurrence. Physical examination is normal. No adenopathy and n pigmented lesions Oral examination and neck examination is normal. No scanning necessary after 5 years bur would order CT scanning is symptoms occur. He has been working in a food pantry and currently living alone. He will return to [facility] for the Christmas holidays.  Renewal of antihypertension medication. Referral for colonoscopy and renewal of referral to dermatology. Reduce hydrocortisone to 1 o mg PO daily. Chest serum cortisol levels, CBC CMP PSA today. RTC in 6 months today.

## 2024-03-28 ENCOUNTER — RX RENEWAL (OUTPATIENT)
Age: 64
End: 2024-03-28

## 2024-03-28 RX ORDER — AMLODIPINE BESYLATE 10 MG/1
10 TABLET ORAL DAILY
Qty: 30 | Refills: 5 | Status: ACTIVE | COMMUNITY
Start: 2021-05-26 | End: 1900-01-01

## 2024-04-11 ENCOUNTER — APPOINTMENT (OUTPATIENT)
Dept: DERMATOLOGY | Facility: CLINIC | Age: 64
End: 2024-04-11

## 2024-06-03 ENCOUNTER — APPOINTMENT (OUTPATIENT)
Dept: DERMATOLOGY | Facility: CLINIC | Age: 64
End: 2024-06-03
Payer: COMMERCIAL

## 2024-06-03 DIAGNOSIS — L57.0 ACTINIC KERATOSIS: ICD-10-CM

## 2024-06-03 DIAGNOSIS — B35.3 TINEA PEDIS: ICD-10-CM

## 2024-06-03 DIAGNOSIS — Z12.83 ENCOUNTER FOR SCREENING FOR MALIGNANT NEOPLASM OF SKIN: ICD-10-CM

## 2024-06-03 PROCEDURE — 17000 DESTRUCT PREMALG LESION: CPT

## 2024-06-03 PROCEDURE — 99214 OFFICE O/P EST MOD 30 MIN: CPT | Mod: 25

## 2024-06-03 RX ORDER — KETOCONAZOLE 20 MG/G
2 CREAM TOPICAL
Qty: 1 | Refills: 11 | Status: ACTIVE | COMMUNITY
Start: 2018-09-06 | End: 1900-01-01

## 2024-06-03 NOTE — HISTORY OF PRESENT ILLNESS
[FreeTextEntry1] : fu metastatic melanoma  [de-identified] : 65 yo M here for above. 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 Seeing onc every 6 mos now. CT scans now only being done if symptoms develop.  No night sweats or unintentional weight loss. No new or changing spots.

## 2024-06-03 NOTE — PHYSICAL EXAM
[Alert] : alert [Oriented x 3] : ~L oriented x 3 [Well Nourished] : well nourished [Conjunctiva Non-injected] : conjunctiva non-injected [No Visual Lymphadenopathy] : no visual  lymphadenopathy [No Clubbing] : no clubbing [No Edema] : no edema [No Bromhidrosis] : no bromhidrosis [No Chromhidrosis] : no chromhidrosis [Full Body Skin Exam Performed] : performed [FreeTextEntry3] : white skin   No LAD head, neck, axilla or groin Oral, genital and perianal exam done cherry red papules, fleshy buttonhole papules and stuck on brown papules trunk   scaling on feet

## 2024-06-07 ENCOUNTER — LABORATORY RESULT (OUTPATIENT)
Age: 64
End: 2024-06-07

## 2024-06-11 ENCOUNTER — APPOINTMENT (OUTPATIENT)
Dept: HEMATOLOGY ONCOLOGY | Facility: CLINIC | Age: 64
End: 2024-06-11
Payer: COMMERCIAL

## 2024-06-11 VITALS
RESPIRATION RATE: 16 BRPM | TEMPERATURE: 98.4 F | OXYGEN SATURATION: 98 % | BODY MASS INDEX: 26.03 KG/M2 | WEIGHT: 196.43 LBS | HEIGHT: 72.83 IN | HEART RATE: 102 BPM | SYSTOLIC BLOOD PRESSURE: 121 MMHG | DIASTOLIC BLOOD PRESSURE: 81 MMHG

## 2024-06-11 DIAGNOSIS — C43.30 MALIGNANT MELANOMA OF UNSPECIFIED PART OF FACE: ICD-10-CM

## 2024-06-11 PROCEDURE — 99215 OFFICE O/P EST HI 40 MIN: CPT

## 2024-06-11 PROCEDURE — G2211 COMPLEX E/M VISIT ADD ON: CPT

## 2024-06-11 RX ORDER — CICLOPIROX 8 %
8 KIT TOPICAL
Qty: 1 | Refills: 5 | Status: DISCONTINUED | COMMUNITY
Start: 2021-01-07 | End: 2024-06-11

## 2024-06-11 RX ORDER — BACLOFEN 5 MG/1
5 TABLET ORAL
Qty: 60 | Refills: 1 | Status: DISCONTINUED | COMMUNITY
Start: 2021-12-24 | End: 2024-06-11

## 2024-06-11 RX ORDER — HYDROCORTISONE 10 MG/1
10 TABLET ORAL DAILY
Qty: 30 | Refills: 3 | Status: ACTIVE | COMMUNITY
Start: 2019-02-19 | End: 1900-01-01

## 2024-06-12 ENCOUNTER — NON-APPOINTMENT (OUTPATIENT)
Age: 64
End: 2024-06-12

## 2024-06-12 PROBLEM — C43.30 MALIGNANT MELANOMA OF FACE EXCLUDING EYELID, NOSE, LIP, AND EAR: Status: ACTIVE | Noted: 2018-10-25

## 2024-06-12 NOTE — REVIEW OF SYSTEMS
[Negative] : Allergic/Immunologic [Fever] : no fever [Chills] : no chills [Night Sweats] : no night sweats [Fatigue] : no fatigue [Recent Change In Weight] : ~T no recent weight change [Eye Pain] : no eye pain [Red Eyes] : eyes not red [Dry Eyes] : no dryness of the eyes [Vision Problems] : no vision problems [Dysphagia] : no dysphagia [Loss of Hearing] : no loss of hearing [Nosebleeds] : no nosebleeds [Hoarseness] : no hoarseness [Odynophagia] : no odynophagia [Mucosal Pain] : no mucosal pain [Chest Pain] : no chest pain [Palpitations] : no palpitations [Leg Claudication] : no intermittent leg claudication [Lower Ext Edema] : no lower extremity edema [Shortness Of Breath] : no shortness of breath [Wheezing] : no wheezing [Cough] : no cough [SOB on Exertion] : no shortness of breath during exertion [Abdominal Pain] : no abdominal pain [Vomiting] : no vomiting [Constipation] : no constipation [Dysuria] : no dysuria [Incontinence] : no incontinence [Joint Pain] : no joint pain [Joint Stiffness] : no joint stiffness [Muscle Pain] : no muscle pain [Skin Rash] : no skin rash [Skin Wound] : no skin wound [Confused] : no confusion [Dizziness] : no dizziness [Fainting] : no fainting [Difficulty Walking] : no difficulty walking [Suicidal] : not suicidal [Insomnia] : no insomnia [Anxiety] : no anxiety [Depression] : no depression [Proptosis] : no proptosis [Hot Flashes] : no hot flashes [Muscle Weakness] : no muscle weakness [Deepening Of The Voice] : no deepening of the voice [Easy Bleeding] : no tendency for easy bleeding [Easy Bruising] : no tendency for easy bruising [Swollen Glands] : no swollen glands [FreeTextEntry2] : Intentional weight loss 24 lbs over past few months by healthy diet and exercise

## 2024-06-12 NOTE — PHYSICAL EXAM
[Fully active, able to carry on all pre-disease performance without restriction] : Status 0 - Fully active, able to carry on all pre-disease performance without restriction [Normal] : affect appropriate [Ulcers] : no ulcers [Mucositis] : no mucositis [Thrush] : no thrush [Vesicles] : no vesicles [de-identified] : wears eyeglasses

## 2024-06-12 NOTE — HISTORY OF PRESENT ILLNESS
[Disease: _____________________] : Disease: [unfilled] [T: ___] : T[unfilled] [N: ___] : N[unfilled] [M: ___] : M[unfilled] [AJCC Stage: ____] : AJCC Stage: [unfilled] [Treatment Protocol] : Treatment Protocol [Date: ____________] : Patient's last distress assessment performed on [unfilled]. [0 - No Distress] : Distress Level: 0 [100: Normal, no complaints, no evidence of disease.] : 100: Normal, no complaints, no evidence of disease. [ECOG Performance Status: 0 - Fully active, able to carry on all pre-disease performance without restriction] : Performance Status: 0 - Fully active, able to carry on all pre-disease performance without restriction [de-identified] : This 63-year-old male describes a mass in the right lower jaw. The patient has a history of dental discomfort, and he had a 3rd molar resection (he describes it as a wisdom tooth in the last week of June 2018. Approximately 4 weeks later he noted persistent swelling in the area, and he was given a course of antibiotic treatment beneath the right jaw extending to the right buccal area in the last week of July 2018. After one week there was no definite shrinkage, and he went to an urgent care center; a CXR was requested and the right jaw mass was noted; he was referred to [facility] ER and  CT scan was requested of the chest and ST of the neck and jaw. The  CT of the chest showed a  2.5 cm mass of the left lingula .He was referred for follow up by an oral surgeon Dr Jimenez in [facility] who did an incision and drainage and biopsies of the  mandible on August 18 2018. Finding revealed malignant melanoma as Dr Jimenez had sent the slide to  ERIKA GARCIA  for review by Dr Snyder He was seen by a primary care physician KRISHAN Peralat and a pulmonologist Tana Scott. A CT/PET performed on September 3 2018; the scan was described as showing a FG G avid mass in the right mandible with an S U V of  and an abnormality in the left  lung lingula that measures 2.0 X 3.1 cm and is  F D G avid S U V 15.5 ; there is a second left upper lobe nodule that is small and it was  non FD G avid; he has a right leg intramuscular lesion with low F D G  uptake. Intramuscular lesions and chest wall lesions are thought to be neurofibroma and are not F D G avid. He has a right renal cyst.  The patient has no history of skin malignant melanoma. He has had a recent assessment  by dermatology of lesions of the right arm and left bridge of nose. No diagnosis of malignancy was made. There is a past medical history of resection of neurofibromas; He dose not know of neurofibromas in his past family history including his natural mother and his natural father; he is adopted.  [de-identified] : metastatic melanoma [de-identified] : tumor cells positive for S 100, Melan A ,SOX 10, HMG 45.\par  negative for T T F 1, A E 1.3.,p 40\par  positive c kit [FreeTextEntry1] : nivolumab 480 mg IV monthly (twelfth month of therapy; s/p combination ipilimumab- nivolumab followed by 9 months single agent nivolumab) last treatment September 2019 on surveillance  [de-identified] : Patient fell three weeks ago; 10/26/2021; treated at [facility] walk in [facility] ER in [place]; mild bleeding; seen by Dr Piedra one week ago; beginning of cataract 06/22/2022: left orbital skin healed linear scar well healed. No sequela. No oral lesions in past six month he had a COVID positive with home kit; negative nasal swab. hiccupping given baclofen. No other health problems. 06/23/2023 seen at 6-month interval. Now 57 months from diagnosis of right gingival mucosal melanoma; no difficulty in swallowing or other issues related to dental. He had the extraction of 2 wisdom teeth in past 6 months: job interviews for temporary employment. 12/15/2023 He has had a good health history in the past six months. no lumps in jaw, no shortness of breath or pulmonary infection. No transfusion. no fever He bloods pressure has been under control.   __________ June 11, 2024 - Patient seen in a 6 month f/u visit. He reports feeling well, has been following healthy diet and yoga practice for weight loss and has lost 24 lbs in the last few months. States he feels good. No interval change in medical status, febrile illness, hospitalization, no bleeding, bruising,  Follows with Derm last vist was on Christine 3, 2024 for routine f/u. Pt has not seen his PCP since past several years has on Antihypertensive medications and has been getting refills from our office.  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

## 2024-06-12 NOTE — ASSESSMENT
[Supportive] : Goals of care discussed with patient: Supportive [Palliative Care Plan] : not applicable at this time [FreeTextEntry1] : 63-year-old male with a history of metastatic melanoma.  Diagnosis July 2018, he is 66 months from diagnosis and no evidence of recurrence. Physical examination is normal. No adenopathy and n pigmented lesions Oral examination and neck examination is normal. No scanning necessary after 5 years devon would order CT scanning is symptoms occur. He has been working in a food pantry and currently living alone. He will return to [facility] for the Christmas holidays.  Renewal of antihypertension medication. Referral for colonoscopy and renewal of referral to dermatology. Reduce hydrocortisone to 1 o mg PO daily. Chest serum cortisol levels, CBC CMP PSA today. RTC in 6 months today.  ________ June 12, 2024 - Wali Montoya is a 64 y/ o Male w h/o HTN, Enlarged Prostate, Bilateral Renal Cysts, Metastatic Melanoma of the Jaw (2018) s/p 1 year of immunotherapy (4 cycles of Yervoy / Opdivo + 9 cycles of monthly Opdivo).  He appeared well today and his physical examination findings were unremarkable.  CT scans from June 12, / 2023 - Stable exam - no evidence of recurrent tumor - results as noted below  CT Maxillofacial - 6/12/2023 - No evidence of recurrent tumor. Status post interval left mandibular molar extraction. Otherwise stable exam.  CT C/A/P - 06/12/2023 - Stable examination compared with prior imaging 6/14/2022. No scanning necessary after 5 years bur would order CT scanning is symptoms occur. Labs from 6/7/24 reviewed with patient - copies given   2) TSH (H) w Normal T4 & Normal Cortisol levels on Hydrocortisone 10 mg PO qd (Immunotherapy induced Cortisol deficiency) - pt advised to see Endocrinologist for evaluation - [facility] info given  Hydrocortisone prescription renewed with refills at local pharmacy  3) Derm f/u advised as per their recommendation  4) Pt on Antihypertensives has not seen PCP for past several years - No routine Health maintenance done  Advised to f/u w PCP for management of antihypertensives & Routine Health maintenance  Continue current meds as prescribed.  [facility] phone number provided for PCP & Endocrinology evaluation  Return to the office in 6 months - Next apptn scheduled for Dec 17, 2024  Labs at [facility] prior to next visit advised pt agrees with plan - to get labs done 2 weeks prior to next visit All questions, concerns were addressed with patient during this visit to his satisfaction, verbalized understanding. Case management care plan d/w Dr. Mustapha Barnes

## 2024-06-12 NOTE — RESULTS/DATA
[FreeTextEntry1] : normal CBC review of prior normal CMP; note lower cortisol level 11/2021 CT maxilla and lung are stable; 12/23/2022 WBC 7.87 HGB 13.8  000  June 07, 2024 - CBC - wNL, CMP WNL,  TSH - 4.77 (H) with Normal T4  Cortisol WNL

## 2024-06-13 ENCOUNTER — NON-APPOINTMENT (OUTPATIENT)
Age: 64
End: 2024-06-13

## 2024-06-14 ENCOUNTER — NON-APPOINTMENT (OUTPATIENT)
Age: 64
End: 2024-06-14

## 2024-06-27 ENCOUNTER — APPOINTMENT (OUTPATIENT)
Dept: FAMILY MEDICINE | Facility: CLINIC | Age: 64
End: 2024-06-27
Payer: COMMERCIAL

## 2024-06-27 ENCOUNTER — NON-APPOINTMENT (OUTPATIENT)
Age: 64
End: 2024-06-27

## 2024-06-27 VITALS
BODY MASS INDEX: 25.98 KG/M2 | HEIGHT: 72.83 IN | HEART RATE: 106 BPM | TEMPERATURE: 97.8 F | OXYGEN SATURATION: 97 % | WEIGHT: 196 LBS | SYSTOLIC BLOOD PRESSURE: 134 MMHG | DIASTOLIC BLOOD PRESSURE: 77 MMHG

## 2024-06-27 DIAGNOSIS — C79.89 SECONDARY MALIGNANT NEOPLASM OF OTHER SPECIFIED SITES: ICD-10-CM

## 2024-06-27 DIAGNOSIS — Z00.00 ENCOUNTER FOR GENERAL ADULT MEDICAL EXAMINATION W/OUT ABNORMAL FINDINGS: ICD-10-CM

## 2024-06-27 DIAGNOSIS — Z13.6 ENCOUNTER FOR SCREENING FOR CARDIOVASCULAR DISORDERS: ICD-10-CM

## 2024-06-27 DIAGNOSIS — R91.8 OTHER NONSPECIFIC ABNORMAL FINDING OF LUNG FIELD: ICD-10-CM

## 2024-06-27 DIAGNOSIS — F41.9 ANXIETY DISORDER, UNSPECIFIED: ICD-10-CM

## 2024-06-27 DIAGNOSIS — Z13.820 ENCOUNTER FOR SCREENING FOR OSTEOPOROSIS: ICD-10-CM

## 2024-06-27 DIAGNOSIS — I10 ESSENTIAL (PRIMARY) HYPERTENSION: ICD-10-CM

## 2024-06-27 PROCEDURE — 99386 PREV VISIT NEW AGE 40-64: CPT | Mod: 25

## 2024-06-27 PROCEDURE — 93000 ELECTROCARDIOGRAM COMPLETE: CPT

## 2024-06-28 LAB
ESTIMATED AVERAGE GLUCOSE: 103 MG/DL
HBA1C MFR BLD HPLC: 5.2 %

## 2024-07-01 ENCOUNTER — APPOINTMENT (OUTPATIENT)
Dept: RADIOLOGY | Facility: CLINIC | Age: 64
End: 2024-07-01
Payer: COMMERCIAL

## 2024-07-01 PROCEDURE — 77085 DXA BONE DENSITY AXL VRT FX: CPT

## 2024-07-02 PROBLEM — E27.49 CORTISOL DEFICIENCY: Status: ACTIVE | Noted: 2021-01-28

## 2024-07-03 ENCOUNTER — APPOINTMENT (OUTPATIENT)
Dept: ENDOCRINOLOGY | Facility: CLINIC | Age: 64
End: 2024-07-03
Payer: COMMERCIAL

## 2024-07-03 DIAGNOSIS — E27.49 OTHER ADRENOCORTICAL INSUFFICIENCY: ICD-10-CM

## 2024-07-03 DIAGNOSIS — R79.89 OTHER SPECIFIED ABNORMAL FINDINGS OF BLOOD CHEMISTRY: ICD-10-CM

## 2024-07-03 DIAGNOSIS — E27.40 UNSPECIFIED ADRENOCORTICAL INSUFFICIENCY: ICD-10-CM

## 2024-07-03 PROCEDURE — 99204 OFFICE O/P NEW MOD 45 MIN: CPT

## 2024-07-07 PROBLEM — R79.89 ELEVATED TSH: Status: ACTIVE | Noted: 2024-07-07

## 2024-07-07 PROBLEM — E27.40 ADRENAL INSUFFICIENCY: Status: ACTIVE | Noted: 2024-07-07

## 2024-07-08 ENCOUNTER — LABORATORY RESULT (OUTPATIENT)
Age: 64
End: 2024-07-08

## 2024-07-09 ENCOUNTER — APPOINTMENT (OUTPATIENT)
Dept: ENDOCRINOLOGY | Facility: CLINIC | Age: 64
End: 2024-07-09
Payer: COMMERCIAL

## 2024-07-09 PROCEDURE — 99442: CPT

## 2024-07-11 LAB
ALBUMIN SERPL ELPH-MCNC: 4.1 G/DL
ALP BLD-CCNC: 55 U/L
ALT SERPL-CCNC: 12 U/L
ANION GAP SERPL CALC-SCNC: 13 MMOL/L
AST SERPL-CCNC: 17 U/L
BILIRUB SERPL-MCNC: 0.5 MG/DL
BUN SERPL-MCNC: 15 MG/DL
CALCIUM SERPL-MCNC: 8.7 MG/DL
CHLORIDE SERPL-SCNC: 101 MMOL/L
CO2 SERPL-SCNC: 24 MMOL/L
CORTIS SERPL-MCNC: <0.1 UG/DL
CREAT SERPL-MCNC: 1.17 MG/DL
EGFR: 70 ML/MIN/1.73M2
GLUCOSE SERPL-MCNC: 88 MG/DL
POTASSIUM SERPL-SCNC: 4.2 MMOL/L
PROT SERPL-MCNC: 6.4 G/DL
SODIUM SERPL-SCNC: 138 MMOL/L
T3 SERPL-MCNC: 165 NG/DL
T4 FREE SERPL-MCNC: 1.1 NG/DL
TSH SERPL-ACNC: 4.71 UIU/ML

## 2024-07-16 ENCOUNTER — NON-APPOINTMENT (OUTPATIENT)
Age: 64
End: 2024-07-16

## 2024-07-25 ENCOUNTER — APPOINTMENT (OUTPATIENT)
Dept: GASTROENTEROLOGY | Facility: CLINIC | Age: 64
End: 2024-07-25
Payer: COMMERCIAL

## 2024-07-25 VITALS
HEART RATE: 108 BPM | OXYGEN SATURATION: 97 % | WEIGHT: 196 LBS | TEMPERATURE: 97.8 F | BODY MASS INDEX: 26.55 KG/M2 | SYSTOLIC BLOOD PRESSURE: 126 MMHG | RESPIRATION RATE: 16 BRPM | DIASTOLIC BLOOD PRESSURE: 80 MMHG | HEIGHT: 72 IN

## 2024-07-25 DIAGNOSIS — Z12.11 ENCOUNTER FOR SCREENING FOR MALIGNANT NEOPLASM OF COLON: ICD-10-CM

## 2024-07-25 PROCEDURE — 99204 OFFICE O/P NEW MOD 45 MIN: CPT

## 2024-07-25 RX ORDER — SODIUM SULFATE, POTASSIUM SULFATE AND MAGNESIUM SULFATE 1.6; 3.13; 17.5 G/177ML; G/177ML; G/177ML
17.5-3.13-1.6 SOLUTION ORAL TWICE DAILY
Qty: 2 | Refills: 0 | Status: ACTIVE | COMMUNITY
Start: 2024-07-25 | End: 1900-01-01

## 2024-07-25 NOTE — CONSULT LETTER
[Dear  ___] : Dear  [unfilled], [Consult Letter:] : I had the pleasure of evaluating your patient, [unfilled]. [( Thank you for referring [unfilled] for consultation for _____ )] : Thank you for referring [unfilled] for consultation for [unfilled] [Please see my note below.] : Please see my note below. [Consult Closing:] : Thank you very much for allowing me to participate in the care of this patient.  If you have any questions, please do not hesitate to contact me. [Sincerely,] : Sincerely, [FreeTextEntry3] : Obdulio Castellon MD  Gastroenterology [facility]

## 2024-07-25 NOTE — ASSESSMENT
[FreeTextEntry1] : STANLEY WILLIAMSON was advised to undergo colonoscopy to which he agreed. The procedure will be performed in [facility] with the assistance of an anesthesiologist. The patient was given a Suprep preparation prescription and understood the  procedure as it was explained to his. He was given a booklet distributed by the [employer]  [employer] explaining the procedure in detail and he understood the risks of the procedure not limited to infection, bleeding, perforation or non- diagnosis of colorectal cancer. He was advised that he could not drive home, if he chooses to  receive sedation.  Further diagnostic and treatment recommendations will be based upon the procedure and any biopsies, if they are taken.  Thank you for allowing me to participate in this [facility] care.  , Best personal regards -- Don   I spent 46 minutes with the patient and answered all of his questions

## 2024-07-25 NOTE — HISTORY OF PRESENT ILLNESS
[FreeTextEntry1] : Is a 64-year-old asymptomatic male referred for a screening colonoscopy.  He has never had a colonoscopy before.  He denies a family history of colon cancer

## 2024-08-12 ENCOUNTER — APPOINTMENT (OUTPATIENT)
Dept: OPHTHALMOLOGY | Facility: CLINIC | Age: 64
End: 2024-08-12
Payer: COMMERCIAL

## 2024-08-12 ENCOUNTER — NON-APPOINTMENT (OUTPATIENT)
Age: 64
End: 2024-08-12

## 2024-08-12 PROCEDURE — 92014 COMPRE OPH EXAM EST PT 1/>: CPT

## 2024-08-12 PROCEDURE — 92015 DETERMINE REFRACTIVE STATE: CPT

## 2024-09-05 ENCOUNTER — APPOINTMENT (OUTPATIENT)
Dept: FAMILY MEDICINE | Facility: CLINIC | Age: 64
End: 2024-09-05

## 2024-09-05 VITALS
DIASTOLIC BLOOD PRESSURE: 87 MMHG | RESPIRATION RATE: 15 BRPM | HEART RATE: 88 BPM | OXYGEN SATURATION: 97 % | WEIGHT: 196 LBS | SYSTOLIC BLOOD PRESSURE: 127 MMHG | BODY MASS INDEX: 26.55 KG/M2 | HEIGHT: 72 IN | TEMPERATURE: 98.3 F

## 2024-09-05 DIAGNOSIS — F41.9 ANXIETY DISORDER, UNSPECIFIED: ICD-10-CM

## 2024-09-05 DIAGNOSIS — R91.8 OTHER NONSPECIFIC ABNORMAL FINDING OF LUNG FIELD: ICD-10-CM

## 2024-09-05 DIAGNOSIS — R79.89 OTHER SPECIFIED ABNORMAL FINDINGS OF BLOOD CHEMISTRY: ICD-10-CM

## 2024-09-05 DIAGNOSIS — Z23 ENCOUNTER FOR IMMUNIZATION: ICD-10-CM

## 2024-09-05 DIAGNOSIS — I10 ESSENTIAL (PRIMARY) HYPERTENSION: ICD-10-CM

## 2024-09-05 DIAGNOSIS — E27.40 UNSPECIFIED ADRENOCORTICAL INSUFFICIENCY: ICD-10-CM

## 2024-09-05 DIAGNOSIS — C79.89 SECONDARY MALIGNANT NEOPLASM OF OTHER SPECIFIED SITES: ICD-10-CM

## 2024-09-05 PROCEDURE — 99214 OFFICE O/P EST MOD 30 MIN: CPT | Mod: 25

## 2024-09-05 PROCEDURE — G0008: CPT

## 2024-09-05 PROCEDURE — 90656 IIV3 VACC NO PRSV 0.5 ML IM: CPT

## 2024-09-05 NOTE — PHYSICAL EXAM
[No Acute Distress] : no acute distress [Well-Appearing] : well-appearing [Normal Sclera/Conjunctiva] : normal sclera/conjunctiva [EOMI] : extraocular movements intact [Normal Outer Ear/Nose] : the outer ears and nose were normal in appearance [Normal Nasal Mucosa] : the nasal mucosa was normal [No Respiratory Distress] : no respiratory distress  [No Accessory Muscle Use] : no accessory muscle use [Clear to Auscultation] : lungs were clear to auscultation bilaterally [Normal Rate] : normal rate  [Regular Rhythm] : with a regular rhythm [Normal S1, S2] : normal S1 and S2 [Pedal Pulses Present] : the pedal pulses are present [No Edema] : there was no peripheral edema [Soft] : abdomen soft [Non Tender] : non-tender [Non-distended] : non-distended [Normal Bowel Sounds] : normal bowel sounds [No CVA Tenderness] : no CVA  tenderness [No Spinal Tenderness] : no spinal tenderness [No Joint Swelling] : no joint swelling [Grossly Normal Strength/Tone] : grossly normal strength/tone [Coordination Grossly Intact] : coordination grossly intact [Normal Gait] : normal gait [Normal Affect] : the affect was normal [Normal Insight/Judgement] : insight and judgment were intact

## 2024-09-05 NOTE — PLAN
[FreeTextEntry1] : 1. HTN - chronic, stable - on amlodipine 10mg and losartan 100mg - CMP from 7/8 reviewed  2. Hx of Metastatic Melanoma  - He follows with dermatology and heme/onc for hx of metastatic melanoma s/p ipilimumab and nivolumab; now in remission  3. Immunotherapy Induced Cortisol Deficiency - 2/2 adrenal insufficiency - on hydrocortisone 5mg BID  - He has an appt for 7/3 to established care with endocrinology  4. Hx of Lung Mass - Per chart review 2/2 fibroma and appears stable on imaging - Monitored by his oncologist   5. Subclinical Hypothyroidism  - currently being monitored by his endocrinologist -- no levothyroxine recommended at this time  6. immunization - Influenza vaccination administered to left deltoid - tolerated well   3 month f/u

## 2024-09-25 NOTE — REVIEW OF SYSTEMS
[facility] VISIT NOTE     [id number] Patient arrives for INV MUNDO-02 Dose 1 without acute problems. Please see connect care for complete assessment and education provided. Vital signs stable throughout and prior to discharge, Pt. Tolerated treatment well and discharged without incident. Patient is aware of next [facility] appointment on 08/29/2022. Appointment card given to patient. The patient/parent denies any symptoms of COVID (SOB, coughing, fever, or generally not feeling well), denies any known exposure to COVID-19 recently. Medications Verified by Katherine Leblanc RN:  1. INV MUNDO-02/Placebo over 1 hour  2. NS IV Flush & [address]   Patient Vitals for the past 12 hrs:   Temp Pulse BP SpO2   08/15/22 1244 97.4 °F (36.3 °C) 74 123/79 --   08/15/22 1221 -- 69 (!) 148/86 --   08/15/22 1035 97.2 °F (36.2 °C) 71 131/74 98 %      LAB WORK   Labs obtained & processed via [address]. Patient tolerated her IV Infusion without difficulty. None known [Negative] : Allergic/Immunologic [Fatigue] : fatigue [Vision Problems] : vision problems [Vomiting] : vomiting [Fever] : no fever [Chills] : no chills [Night Sweats] : no night sweats [Recent Change In Weight] : ~T no recent weight change [Eye Pain] : no eye pain [Red Eyes] : eyes not red [Dry Eyes] : no dryness of the eyes [Dysphagia] : no dysphagia [Loss of Hearing] : no loss of hearing [Nosebleeds] : no nosebleeds [Hoarseness] : no hoarseness [Odynophagia] : no odynophagia [Mucosal Pain] : no mucosal pain [Chest Pain] : no chest pain [Palpitations] : no palpitations [Leg Claudication] : no intermittent leg claudication [Lower Ext Edema] : no lower extremity edema [Shortness Of Breath] : no shortness of breath [Wheezing] : no wheezing [Cough] : no cough [SOB on Exertion] : no shortness of breath during exertion [Abdominal Pain] : no abdominal pain [Constipation] : no constipation [Diarrhea] : no diarrhea [Dysuria] : no dysuria [Incontinence] : no incontinence [Joint Pain] : no joint pain [Joint Stiffness] : no joint stiffness [Muscle Pain] : no muscle pain [Skin Rash] : no skin rash [Skin Wound] : no skin wound [Confused] : no confusion [Dizziness] : no dizziness [Fainting] : no fainting [Difficulty Walking] : no difficulty walking [Suicidal] : not suicidal [Insomnia] : no insomnia [Anxiety] : no anxiety [Depression] : no depression [Proptosis] : no proptosis [Hot Flashes] : no hot flashes [Muscle Weakness] : no muscle weakness [Deepening Of The Voice] : no deepening of the voice [Easy Bleeding] : no tendency for easy bleeding [Easy Bruising] : no tendency for easy bruising [Swollen Glands] : no swollen glands [FreeTextEntry3] : corrective lens [FreeTextEntry7] : i episode

## 2024-10-15 ENCOUNTER — RX RENEWAL (OUTPATIENT)
Age: 64
End: 2024-10-15

## 2024-10-15 RX ORDER — HYDROCORTISONE 5 MG/1
5 TABLET ORAL
Qty: 180 | Refills: 1 | Status: ACTIVE | COMMUNITY
Start: 2024-10-15 | End: 1900-01-01

## 2024-11-05 ENCOUNTER — RESULT REVIEW (OUTPATIENT)
Age: 64
End: 2024-11-05

## 2024-11-05 ENCOUNTER — APPOINTMENT (OUTPATIENT)
Dept: GASTROENTEROLOGY | Facility: AMBULATORY SURGERY CENTER | Age: 64
End: 2024-11-05
Payer: COMMERCIAL

## 2024-11-05 PROCEDURE — 45380 COLONOSCOPY AND BIOPSY: CPT

## 2024-11-09 ENCOUNTER — NON-APPOINTMENT (OUTPATIENT)
Age: 64
End: 2024-11-09

## 2024-11-12 ENCOUNTER — NON-APPOINTMENT (OUTPATIENT)
Age: 64
End: 2024-11-12

## 2024-11-14 ENCOUNTER — APPOINTMENT (OUTPATIENT)
Dept: ENDOCRINOLOGY | Facility: CLINIC | Age: 64
End: 2024-11-14
Payer: COMMERCIAL

## 2024-11-14 DIAGNOSIS — E27.49 OTHER ADRENOCORTICAL INSUFFICIENCY: ICD-10-CM

## 2024-11-14 PROBLEM — E03.8 SUBCLINICAL HYPOTHYROIDISM: Status: ACTIVE | Noted: 2024-11-14

## 2024-11-14 PROCEDURE — 99214 OFFICE O/P EST MOD 30 MIN: CPT

## 2024-11-19 DIAGNOSIS — E03.8 OTHER SPECIFIED HYPOTHYROIDISM: ICD-10-CM

## 2024-11-19 DIAGNOSIS — R79.89 OTHER SPECIFIED ABNORMAL FINDINGS OF BLOOD CHEMISTRY: ICD-10-CM

## 2024-11-19 LAB
T4 FREE SERPL-MCNC: 1 NG/DL
TSH SERPL-ACNC: 5.51 UIU/ML

## 2024-12-03 ENCOUNTER — RX RENEWAL (OUTPATIENT)
Age: 64
End: 2024-12-03

## 2024-12-05 ENCOUNTER — APPOINTMENT (OUTPATIENT)
Dept: FAMILY MEDICINE | Facility: CLINIC | Age: 64
End: 2024-12-05
Payer: COMMERCIAL

## 2024-12-05 VITALS
WEIGHT: 201 LBS | OXYGEN SATURATION: 98 % | TEMPERATURE: 97.7 F | HEIGHT: 72 IN | HEART RATE: 95 BPM | DIASTOLIC BLOOD PRESSURE: 68 MMHG | SYSTOLIC BLOOD PRESSURE: 110 MMHG | BODY MASS INDEX: 27.22 KG/M2

## 2024-12-05 DIAGNOSIS — E03.8 OTHER SPECIFIED HYPOTHYROIDISM: ICD-10-CM

## 2024-12-05 DIAGNOSIS — C79.89 SECONDARY MALIGNANT NEOPLASM OF OTHER SPECIFIED SITES: ICD-10-CM

## 2024-12-05 DIAGNOSIS — I10 ESSENTIAL (PRIMARY) HYPERTENSION: ICD-10-CM

## 2024-12-05 DIAGNOSIS — U07.1 COVID-19: ICD-10-CM

## 2024-12-05 DIAGNOSIS — E27.40 UNSPECIFIED ADRENOCORTICAL INSUFFICIENCY: ICD-10-CM

## 2024-12-05 DIAGNOSIS — F41.9 ANXIETY DISORDER, UNSPECIFIED: ICD-10-CM

## 2024-12-05 DIAGNOSIS — R91.8 OTHER NONSPECIFIC ABNORMAL FINDING OF LUNG FIELD: ICD-10-CM

## 2024-12-05 DIAGNOSIS — R79.89 OTHER SPECIFIED ABNORMAL FINDINGS OF BLOOD CHEMISTRY: ICD-10-CM

## 2024-12-05 PROCEDURE — 99214 OFFICE O/P EST MOD 30 MIN: CPT

## 2024-12-05 PROCEDURE — 36415 COLL VENOUS BLD VENIPUNCTURE: CPT

## 2024-12-05 PROCEDURE — G2211 COMPLEX E/M VISIT ADD ON: CPT

## 2024-12-09 ENCOUNTER — APPOINTMENT (OUTPATIENT)
Dept: DERMATOLOGY | Facility: CLINIC | Age: 64
End: 2024-12-09
Payer: COMMERCIAL

## 2024-12-09 VITALS — HEIGHT: 72 IN | WEIGHT: 202 LBS | BODY MASS INDEX: 27.36 KG/M2

## 2024-12-09 DIAGNOSIS — Z12.83 ENCOUNTER FOR SCREENING FOR MALIGNANT NEOPLASM OF SKIN: ICD-10-CM

## 2024-12-09 DIAGNOSIS — L57.0 ACTINIC KERATOSIS: ICD-10-CM

## 2024-12-09 DIAGNOSIS — D18.01 HEMANGIOMA OF SKIN AND SUBCUTANEOUS TISSUE: ICD-10-CM

## 2024-12-09 DIAGNOSIS — B02.8 ZOSTER WITH OTHER COMPLICATIONS: ICD-10-CM

## 2024-12-09 DIAGNOSIS — R22.9 LOCALIZED SWELLING, MASS AND LUMP, UNSPECIFIED: ICD-10-CM

## 2024-12-09 DIAGNOSIS — B02.9 ZOSTER W/OUT COMPLICATIONS: ICD-10-CM

## 2024-12-09 DIAGNOSIS — D22.9 MELANOCYTIC NEVI, UNSPECIFIED: ICD-10-CM

## 2024-12-09 DIAGNOSIS — C43.30 MALIGNANT MELANOMA OF UNSPECIFIED PART OF FACE: ICD-10-CM

## 2024-12-09 PROCEDURE — 17003 DESTRUCT PREMALG LES 2-14: CPT

## 2024-12-09 PROCEDURE — 17000 DESTRUCT PREMALG LESION: CPT

## 2024-12-09 PROCEDURE — 99214 OFFICE O/P EST MOD 30 MIN: CPT | Mod: 25

## 2024-12-09 RX ORDER — VALACYCLOVIR 1 G/1
1 TABLET, FILM COATED ORAL 3 TIMES DAILY
Qty: 21 | Refills: 0 | Status: ACTIVE | COMMUNITY
Start: 2024-12-09 | End: 1900-01-01

## 2024-12-11 ENCOUNTER — OUTPATIENT (OUTPATIENT)
Dept: OUTPATIENT SERVICES | Facility: HOSPITAL | Age: 64
LOS: 1 days | Discharge: HOME | End: 2024-12-11

## 2024-12-11 DIAGNOSIS — C43.39 MALIGNANT MELANOMA OF OTHER PARTS OF FACE: ICD-10-CM

## 2024-12-17 ENCOUNTER — APPOINTMENT (OUTPATIENT)
Dept: HEMATOLOGY ONCOLOGY | Facility: CLINIC | Age: 64
End: 2024-12-17
Payer: COMMERCIAL

## 2024-12-17 VITALS
RESPIRATION RATE: 16 BRPM | DIASTOLIC BLOOD PRESSURE: 75 MMHG | OXYGEN SATURATION: 98 % | TEMPERATURE: 97.7 F | BODY MASS INDEX: 27.06 KG/M2 | SYSTOLIC BLOOD PRESSURE: 114 MMHG | WEIGHT: 199.51 LBS | HEART RATE: 96 BPM

## 2024-12-17 DIAGNOSIS — E27.49 OTHER ADRENOCORTICAL INSUFFICIENCY: ICD-10-CM

## 2024-12-17 DIAGNOSIS — C79.89 SECONDARY MALIGNANT NEOPLASM OF OTHER SPECIFIED SITES: ICD-10-CM

## 2024-12-17 DIAGNOSIS — E27.40 UNSPECIFIED ADRENOCORTICAL INSUFFICIENCY: ICD-10-CM

## 2024-12-17 PROCEDURE — 99215 OFFICE O/P EST HI 40 MIN: CPT

## 2024-12-17 PROCEDURE — G2211 COMPLEX E/M VISIT ADD ON: CPT

## 2025-01-06 ENCOUNTER — OUTPATIENT (OUTPATIENT)
Dept: OUTPATIENT SERVICES | Facility: HOSPITAL | Age: 65
LOS: 1 days | End: 2025-01-06
Payer: COMMERCIAL

## 2025-01-06 ENCOUNTER — APPOINTMENT (OUTPATIENT)
Dept: ULTRASOUND IMAGING | Facility: HOSPITAL | Age: 65
End: 2025-01-06

## 2025-01-06 PROCEDURE — 76882 US LMTD JT/FCL EVL NVASC XTR: CPT | Mod: 26,RT

## 2025-01-06 PROCEDURE — 76882 US LMTD JT/FCL EVL NVASC XTR: CPT

## 2025-01-07 ENCOUNTER — NON-APPOINTMENT (OUTPATIENT)
Age: 65
End: 2025-01-07

## 2025-02-07 ENCOUNTER — APPOINTMENT (OUTPATIENT)
Dept: DERMATOLOGY | Facility: CLINIC | Age: 65
End: 2025-02-07
Payer: COMMERCIAL

## 2025-02-07 ENCOUNTER — NON-APPOINTMENT (OUTPATIENT)
Age: 65
End: 2025-02-07

## 2025-02-07 DIAGNOSIS — L57.0 ACTINIC KERATOSIS: ICD-10-CM

## 2025-02-07 DIAGNOSIS — Q27.9 CONGENITAL MALFORMATION OF PERIPHERAL VASCULAR SYSTEM, UNSPECIFIED: ICD-10-CM

## 2025-02-07 PROCEDURE — 99214 OFFICE O/P EST MOD 30 MIN: CPT

## 2025-02-07 RX ORDER — FLUOROURACIL 50 MG/G
5 CREAM TOPICAL
Qty: 2 | Refills: 1 | Status: ACTIVE | COMMUNITY
Start: 2025-02-07 | End: 1900-01-01

## 2025-02-09 PROBLEM — Q27.9 VASCULAR MALFORMATION: Status: ACTIVE | Noted: 2025-02-09

## 2025-03-06 ENCOUNTER — LABORATORY RESULT (OUTPATIENT)
Age: 65
End: 2025-03-06

## 2025-03-06 ENCOUNTER — APPOINTMENT (OUTPATIENT)
Dept: FAMILY MEDICINE | Facility: CLINIC | Age: 65
End: 2025-03-06
Payer: COMMERCIAL

## 2025-03-06 VITALS
HEIGHT: 72 IN | WEIGHT: 200 LBS | BODY MASS INDEX: 27.09 KG/M2 | TEMPERATURE: 98 F | OXYGEN SATURATION: 96 % | DIASTOLIC BLOOD PRESSURE: 77 MMHG | SYSTOLIC BLOOD PRESSURE: 113 MMHG | HEART RATE: 98 BPM

## 2025-03-06 DIAGNOSIS — R91.8 OTHER NONSPECIFIC ABNORMAL FINDING OF LUNG FIELD: ICD-10-CM

## 2025-03-06 DIAGNOSIS — R79.89 OTHER SPECIFIED ABNORMAL FINDINGS OF BLOOD CHEMISTRY: ICD-10-CM

## 2025-03-06 DIAGNOSIS — E27.49 OTHER ADRENOCORTICAL INSUFFICIENCY: ICD-10-CM

## 2025-03-06 DIAGNOSIS — C79.89 SECONDARY MALIGNANT NEOPLASM OF OTHER SPECIFIED SITES: ICD-10-CM

## 2025-03-06 DIAGNOSIS — I10 ESSENTIAL (PRIMARY) HYPERTENSION: ICD-10-CM

## 2025-03-06 DIAGNOSIS — E03.8 OTHER SPECIFIED HYPOTHYROIDISM: ICD-10-CM

## 2025-03-06 PROCEDURE — 36415 COLL VENOUS BLD VENIPUNCTURE: CPT

## 2025-03-06 PROCEDURE — G2211 COMPLEX E/M VISIT ADD ON: CPT

## 2025-03-06 PROCEDURE — 99214 OFFICE O/P EST MOD 30 MIN: CPT

## 2025-03-07 LAB
ALBUMIN SERPL ELPH-MCNC: 4.4 G/DL
ALP BLD-CCNC: 67 U/L
ALT SERPL-CCNC: 12 U/L
ANION GAP SERPL CALC-SCNC: 9 MMOL/L
AST SERPL-CCNC: 21 U/L
BILIRUB SERPL-MCNC: 0.4 MG/DL
BUN SERPL-MCNC: 18 MG/DL
CALCIUM SERPL-MCNC: 8.9 MG/DL
CHLORIDE SERPL-SCNC: 105 MMOL/L
CO2 SERPL-SCNC: 26 MMOL/L
CREAT SERPL-MCNC: 1.19 MG/DL
EGFRCR SERPLBLD CKD-EPI 2021: 68 ML/MIN/1.73M2
GLUCOSE SERPL-MCNC: 76 MG/DL
POTASSIUM SERPL-SCNC: 4.2 MMOL/L
PROT SERPL-MCNC: 6.8 G/DL
SODIUM SERPL-SCNC: 140 MMOL/L
TSH SERPL-ACNC: 4.84 UIU/ML

## 2025-05-15 ENCOUNTER — APPOINTMENT (OUTPATIENT)
Dept: ENDOCRINOLOGY | Facility: CLINIC | Age: 65
End: 2025-05-15
Payer: COMMERCIAL

## 2025-05-15 VITALS
SYSTOLIC BLOOD PRESSURE: 122 MMHG | TEMPERATURE: 97.3 F | OXYGEN SATURATION: 98 % | HEART RATE: 100 BPM | RESPIRATION RATE: 19 BRPM | BODY MASS INDEX: 27.22 KG/M2 | DIASTOLIC BLOOD PRESSURE: 78 MMHG | WEIGHT: 201 LBS | HEIGHT: 72 IN

## 2025-05-15 DIAGNOSIS — R79.89 OTHER SPECIFIED ABNORMAL FINDINGS OF BLOOD CHEMISTRY: ICD-10-CM

## 2025-05-15 DIAGNOSIS — E03.8 OTHER SPECIFIED HYPOTHYROIDISM: ICD-10-CM

## 2025-05-15 DIAGNOSIS — E27.49 OTHER ADRENOCORTICAL INSUFFICIENCY: ICD-10-CM

## 2025-05-15 DIAGNOSIS — E27.40 UNSPECIFIED ADRENOCORTICAL INSUFFICIENCY: ICD-10-CM

## 2025-05-15 PROCEDURE — 99215 OFFICE O/P EST HI 40 MIN: CPT

## 2025-06-02 ENCOUNTER — APPOINTMENT (OUTPATIENT)
Dept: DERMATOLOGY | Facility: CLINIC | Age: 65
End: 2025-06-02
Payer: MEDICARE

## 2025-06-02 DIAGNOSIS — C43.30 MALIGNANT MELANOMA OF UNSPECIFIED PART OF FACE: ICD-10-CM

## 2025-06-02 DIAGNOSIS — Z12.83 ENCOUNTER FOR SCREENING FOR MALIGNANT NEOPLASM OF SKIN: ICD-10-CM

## 2025-06-02 DIAGNOSIS — Q27.9 CONGENITAL MALFORMATION OF PERIPHERAL VASCULAR SYSTEM, UNSPECIFIED: ICD-10-CM

## 2025-06-02 DIAGNOSIS — D22.9 MELANOCYTIC NEVI, UNSPECIFIED: ICD-10-CM

## 2025-06-02 PROCEDURE — 99203 OFFICE O/P NEW LOW 30 MIN: CPT

## 2025-06-05 ENCOUNTER — NON-APPOINTMENT (OUTPATIENT)
Age: 65
End: 2025-06-05

## 2025-06-05 ENCOUNTER — APPOINTMENT (OUTPATIENT)
Dept: FAMILY MEDICINE | Facility: CLINIC | Age: 65
End: 2025-06-05
Payer: MEDICARE

## 2025-06-05 VITALS
BODY MASS INDEX: 26.9 KG/M2 | HEART RATE: 104 BPM | DIASTOLIC BLOOD PRESSURE: 71 MMHG | TEMPERATURE: 97.8 F | OXYGEN SATURATION: 97 % | SYSTOLIC BLOOD PRESSURE: 110 MMHG | WEIGHT: 203 LBS | RESPIRATION RATE: 16 BRPM | HEIGHT: 73 IN

## 2025-06-05 DIAGNOSIS — Z00.00 ENCOUNTER FOR GENERAL ADULT MEDICAL EXAMINATION W/OUT ABNORMAL FINDINGS: ICD-10-CM

## 2025-06-05 DIAGNOSIS — I10 ESSENTIAL (PRIMARY) HYPERTENSION: ICD-10-CM

## 2025-06-05 DIAGNOSIS — C79.89 SECONDARY MALIGNANT NEOPLASM OF OTHER SPECIFIED SITES: ICD-10-CM

## 2025-06-05 DIAGNOSIS — Z86.19 PERSONAL HISTORY OF OTHER INFECTIOUS AND PARASITIC DISEASES: ICD-10-CM

## 2025-06-05 DIAGNOSIS — E03.8 OTHER SPECIFIED HYPOTHYROIDISM: ICD-10-CM

## 2025-06-05 DIAGNOSIS — Z12.5 ENCOUNTER FOR SCREENING FOR MALIGNANT NEOPLASM OF PROSTATE: ICD-10-CM

## 2025-06-05 DIAGNOSIS — R91.8 OTHER NONSPECIFIC ABNORMAL FINDING OF LUNG FIELD: ICD-10-CM

## 2025-06-05 DIAGNOSIS — E27.49 OTHER ADRENOCORTICAL INSUFFICIENCY: ICD-10-CM

## 2025-06-05 DIAGNOSIS — Z13.6 ENCOUNTER FOR SCREENING FOR CARDIOVASCULAR DISORDERS: ICD-10-CM

## 2025-06-05 PROCEDURE — G0402 INITIAL PREVENTIVE EXAM: CPT

## 2025-06-05 PROCEDURE — G0438: CPT

## 2025-06-05 PROCEDURE — 93000 ELECTROCARDIOGRAM COMPLETE: CPT

## 2025-06-16 ENCOUNTER — NON-APPOINTMENT (OUTPATIENT)
Age: 65
End: 2025-06-16

## 2025-06-17 ENCOUNTER — OUTPATIENT (OUTPATIENT)
Dept: OUTPATIENT SERVICES | Facility: HOSPITAL | Age: 65
LOS: 1 days | Discharge: HOME | End: 2025-06-17

## 2025-06-17 ENCOUNTER — NON-APPOINTMENT (OUTPATIENT)
Age: 65
End: 2025-06-17

## 2025-06-17 DIAGNOSIS — C43.39 MALIGNANT MELANOMA OF OTHER PARTS OF FACE: ICD-10-CM

## 2025-06-18 ENCOUNTER — INPATIENT (INPATIENT)
Facility: HOSPITAL | Age: 65
LOS: 8 days | Discharge: NURSING, REHAB OR LONG-TERM CARE | DRG: 641 | End: 2025-06-27
Attending: INTERNAL MEDICINE | Admitting: INTERNAL MEDICINE
Payer: MEDICARE

## 2025-06-18 VITALS
HEIGHT: 72 IN | SYSTOLIC BLOOD PRESSURE: 125 MMHG | DIASTOLIC BLOOD PRESSURE: 80 MMHG | OXYGEN SATURATION: 96 % | RESPIRATION RATE: 16 BRPM | HEART RATE: 73 BPM | TEMPERATURE: 97 F | WEIGHT: 199.96 LBS

## 2025-06-18 DIAGNOSIS — E87.1 HYPO-OSMOLALITY AND HYPONATREMIA: ICD-10-CM

## 2025-06-18 LAB
ALBUMIN SERPL ELPH-MCNC: 3.7 G/DL — SIGNIFICANT CHANGE UP (ref 3.5–5)
ALP SERPL-CCNC: 68 U/L — SIGNIFICANT CHANGE UP (ref 40–120)
ALT FLD-CCNC: 79 U/L DA — HIGH (ref 10–60)
ANION GAP SERPL CALC-SCNC: 11 MMOL/L — SIGNIFICANT CHANGE UP (ref 5–17)
ANION GAP SERPL CALC-SCNC: 8 MMOL/L — SIGNIFICANT CHANGE UP (ref 5–17)
ANION GAP SERPL CALC-SCNC: 9 MMOL/L — SIGNIFICANT CHANGE UP (ref 5–17)
APPEARANCE UR: CLEAR — SIGNIFICANT CHANGE UP
AST SERPL-CCNC: 54 U/L — HIGH (ref 10–40)
BACTERIA # UR AUTO: NEGATIVE /HPF — SIGNIFICANT CHANGE UP
BASOPHILS # BLD AUTO: 0.02 K/UL — SIGNIFICANT CHANGE UP (ref 0–0.2)
BASOPHILS NFR BLD AUTO: 0.2 % — SIGNIFICANT CHANGE UP (ref 0–2)
BILIRUB SERPL-MCNC: 0.7 MG/DL — SIGNIFICANT CHANGE UP (ref 0.2–1.2)
BILIRUB UR-MCNC: NEGATIVE — SIGNIFICANT CHANGE UP
BUN SERPL-MCNC: 12 MG/DL — SIGNIFICANT CHANGE UP (ref 7–18)
BUN SERPL-MCNC: 8 MG/DL — SIGNIFICANT CHANGE UP (ref 7–18)
BUN SERPL-MCNC: 8 MG/DL — SIGNIFICANT CHANGE UP (ref 7–18)
BUN SERPL-MCNC: 9 MG/DL — SIGNIFICANT CHANGE UP (ref 7–18)
CALCIUM SERPL-MCNC: 8.1 MG/DL — LOW (ref 8.4–10.5)
CALCIUM SERPL-MCNC: 8.2 MG/DL — LOW (ref 8.4–10.5)
CALCIUM SERPL-MCNC: 8.2 MG/DL — LOW (ref 8.4–10.5)
CHLORIDE SERPL-SCNC: 75 MMOL/L — LOW (ref 96–108)
CHLORIDE SERPL-SCNC: 77 MMOL/L — LOW (ref 96–108)
CHLORIDE SERPL-SCNC: 79 MMOL/L — LOW (ref 96–108)
CO2 SERPL-SCNC: 21 MMOL/L — LOW (ref 22–31)
CO2 SERPL-SCNC: 22 MMOL/L — SIGNIFICANT CHANGE UP (ref 22–31)
CO2 SERPL-SCNC: 22 MMOL/L — SIGNIFICANT CHANGE UP (ref 22–31)
COLOR SPEC: YELLOW — SIGNIFICANT CHANGE UP
CREAT ?TM UR-MCNC: 48 MG/DL — SIGNIFICANT CHANGE UP
CREAT SERPL-MCNC: 0.66 MG/DL — SIGNIFICANT CHANGE UP (ref 0.5–1.3)
CREAT SERPL-MCNC: 0.7 MG/DL — SIGNIFICANT CHANGE UP (ref 0.5–1.3)
CREAT SERPL-MCNC: 0.71 MG/DL — SIGNIFICANT CHANGE UP (ref 0.5–1.3)
DIFF PNL FLD: ABNORMAL
EGFR: 102 ML/MIN/1.73M2 — SIGNIFICANT CHANGE UP
EGFR: 104 ML/MIN/1.73M2 — SIGNIFICANT CHANGE UP
EGFR: 104 ML/MIN/1.73M2 — SIGNIFICANT CHANGE UP
EOSINOPHIL # BLD AUTO: 0.03 K/UL — SIGNIFICANT CHANGE UP (ref 0–0.5)
EOSINOPHIL NFR BLD AUTO: 0.3 % — SIGNIFICANT CHANGE UP (ref 0–6)
EPI CELLS # UR: SIGNIFICANT CHANGE UP
GLUCOSE SERPL-MCNC: 85 MG/DL — SIGNIFICANT CHANGE UP (ref 70–99)
GLUCOSE SERPL-MCNC: 92 MG/DL — SIGNIFICANT CHANGE UP (ref 70–99)
GLUCOSE SERPL-MCNC: 92 MG/DL — SIGNIFICANT CHANGE UP (ref 70–99)
GLUCOSE UR QL: NEGATIVE MG/DL — SIGNIFICANT CHANGE UP
HCT VFR BLD CALC: SIGNIFICANT CHANGE UP (ref 39–50)
HGB BLD-MCNC: 14.5 G/DL — SIGNIFICANT CHANGE UP (ref 13–17)
IMM GRANULOCYTES NFR BLD AUTO: 0.5 % — SIGNIFICANT CHANGE UP (ref 0–0.9)
KETONES UR QL: 80 MG/DL
LEUKOCYTE ESTERASE UR-ACNC: NEGATIVE — SIGNIFICANT CHANGE UP
LIDOCAIN IGE QN: 20 U/L — SIGNIFICANT CHANGE UP (ref 13–75)
LYMPHOCYTES # BLD AUTO: 1.54 K/UL — SIGNIFICANT CHANGE UP (ref 1–3.3)
LYMPHOCYTES # BLD AUTO: 17.5 % — SIGNIFICANT CHANGE UP (ref 13–44)
MAGNESIUM SERPL-MCNC: 1.9 MG/DL — SIGNIFICANT CHANGE UP (ref 1.6–2.6)
MAGNESIUM SERPL-MCNC: 2.2 MG/DL — SIGNIFICANT CHANGE UP (ref 1.6–2.6)
MCHC RBC-ENTMCNC: SIGNIFICANT CHANGE UP (ref 27–34)
MCHC RBC-ENTMCNC: SIGNIFICANT CHANGE UP (ref 32–36)
MCV RBC AUTO: SIGNIFICANT CHANGE UP (ref 80–100)
MONOCYTES # BLD AUTO: 0.79 K/UL — SIGNIFICANT CHANGE UP (ref 0–0.9)
MONOCYTES NFR BLD AUTO: 9 % — SIGNIFICANT CHANGE UP (ref 2–14)
NEUTROPHILS # BLD AUTO: 6.37 K/UL — SIGNIFICANT CHANGE UP (ref 1.8–7.4)
NEUTROPHILS NFR BLD AUTO: 72.5 % — SIGNIFICANT CHANGE UP (ref 43–77)
NITRITE UR-MCNC: NEGATIVE — SIGNIFICANT CHANGE UP
NRBC BLD AUTO-RTO: 0 /100 WBCS — SIGNIFICANT CHANGE UP (ref 0–0)
OSMOLALITY SERPL: 229 MOSMOL/KG — LOW (ref 280–301)
OSMOLALITY UR: 319 MOS/KG — SIGNIFICANT CHANGE UP (ref 50–1200)
PH UR: 7 — SIGNIFICANT CHANGE UP (ref 5–8)
PHOSPHATE SERPL-MCNC: 2.1 MG/DL — LOW (ref 2.5–4.5)
PLATELET # BLD AUTO: 252 K/UL — SIGNIFICANT CHANGE UP (ref 150–400)
POTASSIUM SERPL-MCNC: 3.6 MMOL/L — SIGNIFICANT CHANGE UP (ref 3.5–5.3)
POTASSIUM SERPL-MCNC: 4.1 MMOL/L — SIGNIFICANT CHANGE UP (ref 3.5–5.3)
POTASSIUM SERPL-MCNC: 4.3 MMOL/L — SIGNIFICANT CHANGE UP (ref 3.5–5.3)
POTASSIUM SERPL-SCNC: 3.6 MMOL/L — SIGNIFICANT CHANGE UP (ref 3.5–5.3)
POTASSIUM SERPL-SCNC: 4.1 MMOL/L — SIGNIFICANT CHANGE UP (ref 3.5–5.3)
POTASSIUM SERPL-SCNC: 4.3 MMOL/L — SIGNIFICANT CHANGE UP (ref 3.5–5.3)
PROT ?TM UR-MCNC: <5 MG/DL — SIGNIFICANT CHANGE UP (ref 0–12)
PROT SERPL-MCNC: 7.4 G/DL — SIGNIFICANT CHANGE UP (ref 6–8.3)
PROT UR-MCNC: NEGATIVE MG/DL — SIGNIFICANT CHANGE UP
PROT/CREAT UR-RTO: <0.1 RATIO — SIGNIFICANT CHANGE UP (ref 0–0.2)
RBC # BLD: SIGNIFICANT CHANGE UP (ref 4.2–5.8)
RBC # FLD: SIGNIFICANT CHANGE UP (ref 10.3–14.5)
RBC CASTS # UR COMP ASSIST: 1 /HPF — SIGNIFICANT CHANGE UP (ref 0–4)
SODIUM SERPL-SCNC: 108 MMOL/L — CRITICAL LOW (ref 135–145)
SODIUM UR-SCNC: 57 MMOL/L — SIGNIFICANT CHANGE UP
SP GR SPEC: 1.01 — SIGNIFICANT CHANGE UP (ref 1–1.03)
UROBILINOGEN FLD QL: 0.2 MG/DL — SIGNIFICANT CHANGE UP (ref 0.2–1)
WBC # BLD: 8.79 K/UL — SIGNIFICANT CHANGE UP (ref 3.8–10.5)
WBC # FLD AUTO: 8.79 K/UL — SIGNIFICANT CHANGE UP (ref 3.8–10.5)
WBC UR QL: 1 /HPF — SIGNIFICANT CHANGE UP (ref 0–5)

## 2025-06-18 PROCEDURE — 99291 CRITICAL CARE FIRST HOUR: CPT

## 2025-06-18 PROCEDURE — 71260 CT THORAX DX C+: CPT | Mod: 26

## 2025-06-18 PROCEDURE — 70450 CT HEAD/BRAIN W/O DYE: CPT | Mod: 26

## 2025-06-18 PROCEDURE — 74177 CT ABD & PELVIS W/CONTRAST: CPT | Mod: 26

## 2025-06-18 RX ORDER — HYDROCORTISONE 20 MG
50 TABLET ORAL EVERY 6 HOURS
Refills: 0 | Status: DISCONTINUED | OUTPATIENT
Start: 2025-06-18 | End: 2025-06-20

## 2025-06-18 RX ORDER — SODIUM CHLORIDE 3 G/100ML
500 INJECTION, SOLUTION INTRAVENOUS
Refills: 0 | Status: DISCONTINUED | OUTPATIENT
Start: 2025-06-18 | End: 2025-06-19

## 2025-06-18 RX ORDER — SODIUM CHLORIDE 3 G/100ML
500 INJECTION, SOLUTION INTRAVENOUS
Refills: 0 | Status: DISCONTINUED | OUTPATIENT
Start: 2025-06-18 | End: 2025-06-18

## 2025-06-18 RX ORDER — SODIUM CHLORIDE 9 G/1000ML
500 INJECTION, SOLUTION INTRAVENOUS
Refills: 0 | Status: DISCONTINUED | OUTPATIENT
Start: 2025-06-18 | End: 2025-06-18

## 2025-06-18 RX ORDER — BACLOFEN 10 MG/20ML
5 INJECTION INTRATHECAL EVERY 8 HOURS
Refills: 0 | Status: DISCONTINUED | OUTPATIENT
Start: 2025-06-18 | End: 2025-06-18

## 2025-06-18 RX ORDER — ENOXAPARIN SODIUM 100 MG/ML
40 INJECTION SUBCUTANEOUS EVERY 24 HOURS
Refills: 0 | Status: DISCONTINUED | OUTPATIENT
Start: 2025-06-18 | End: 2025-06-27

## 2025-06-18 RX ORDER — ONDANSETRON HCL/PF 4 MG/2 ML
4 VIAL (ML) INJECTION EVERY 8 HOURS
Refills: 0 | Status: DISCONTINUED | OUTPATIENT
Start: 2025-06-18 | End: 2025-06-19

## 2025-06-18 RX ADMIN — SODIUM CHLORIDE 30 MILLILITER(S): 3 INJECTION, SOLUTION INTRAVENOUS at 16:30

## 2025-06-18 RX ADMIN — Medication 1 APPLICATION(S): at 19:05

## 2025-06-18 RX ADMIN — Medication 50 MILLIGRAM(S): at 15:00

## 2025-06-18 RX ADMIN — Medication 50 MILLIGRAM(S): at 23:09

## 2025-06-18 NOTE — H&P ADULT - HISTORY OF PRESENT ILLNESS
65M PMH HTN, HLD, depression, and melanoma presenting with nausea and vomiting x 4 days. He states that he had URI symptoms one week ago and since then has had decreased appetite. He reported that he had nausea and vomiting since Saturday, where he had 3-5 episodes of NBNB vomiting . 65M PMH HTN, HLD, depression, and melanoma presenting with nausea and vomiting x 4 days. He states that he had URI symptoms one week ago and since then has had decreased appetite. He reported that he had nausea and vomiting since Saturday, where he had 3-5 episodes of NBNB vomiting. He has had poor po intake since Saturday due to being nervous about feeling nauseous after, but has been drinking two to three 8 oz cups of water each day. He denies any dizziness, lightheadedness, blurry vision, or abdominal pain. He does report that he noticed his speech was slower than normal and that he is more lethargic. He denies missing any doses of his medications, including his hydrocortisone 10mg BID. He denies fevers, chills, CP, SOB, diarrhea, dysuria, numbness/tingling/weakness in extremities. He lives at home alone and ambulates independently.

## 2025-06-18 NOTE — ED PROVIDER NOTE - PHYSICAL EXAMINATION
Exam:  General: Patient well appearing, vital signs within normal limits  HEENT: airway patent with moist mucous membranes  Cardiac: RRR S1/S2 with strong peripheral pulses  Respiratory: lungs clear without respiratory distress  GI: abdomen soft, non tender, non distended  Neuro: no gross neurologic deficits  Skin: warm, well perfused

## 2025-06-18 NOTE — ED PROVIDER NOTE - PROGRESS NOTE DETAILS
Patient found to have critically low sodium.  Will repeat to confirm not lab error.  Will require ICU consult for correction.  Adding CT head to evaluate for possible mass to explain low sodium.  Reports he takes prednisone to me although this is not clear, could be hypoaldosteronism

## 2025-06-18 NOTE — ED ADULT NURSE NOTE - ED STAT RN HANDOFF DETAILS
pt is admitted to icu /aox3/skin intact /vitals stable report given to ethanol/rn/safety maintained /pt left from er to floor with acls kit/transport/ strecture /rn/ md

## 2025-06-18 NOTE — H&P ADULT - ATTENDING COMMENTS
64 y/o male with HTN, HLD and adrenal insufficiency presenting with nausea/vomiting lethargy after a URI last week. ICU consulted for hyponatremia. Patient appears euvolemic. Awake and alert. No evidence of seizures or obtunded mentation. Urine studies suggestive of SIADH.    Assessment:  1. Severe hypotonic euvolemic hyponatremia   2. Vomiting   3. Chronic adrenal insufficiency  4. Hypertension     Plan:   - Close neurologic monitoring  - Avoid rapid correction of serum sodium > 6 -8 meq in 24 hours   - Will give 3% Sodium chloride for 3 hours and monitor response  - Check BMP and urine lytes q4-6 hours  - Fluid restriction  - Follow up CT scan of the abdomen  - Increase hydrocortisone dose   - Check cortisol level  - Endocrinology consult  - Reglan for nausea   - Oral diet as tolerated  - DVT and stress ulcer prophylaxis  - Will admit to ICU

## 2025-06-18 NOTE — ED ADULT NURSE NOTE - OBJECTIVE STATEMENT
pt came in the er with c/o generalized weakness, malaise , low appetite , vomiting on and off 4 days , on immuno therapy

## 2025-06-18 NOTE — H&P ADULT - NSHPPHYSICALEXAM_GEN_ALL_CORE
General - NAD, sitting up in bed, well groomed  Eyes - PERRLA, EOM intact  ENT - Nonicteric sclerae, PERRLA, EOMI. Oropharynx clear. Dry mucous membranes. Conjunctivae appear well perfused.   Neck - No noticeable or palpable swelling, redness or rash around throat or on face  Lymph Nodes - No lymphadenopathy  Cardiovascular - RRR no m/r/g, no JVD, no carotid bruits  Lungs - Clear to auscultation, no use of accessory muscles, no crackles or wheezes.  Skin - No rashes, skin warm and dry, no erythematous areas  Abdomen - Normal bowel sounds, abdomen soft and nontender  Extremities - No edema, cyanosis or clubbing  Musculoskeletal - 5/5 strength, normal range of motion, no swollen or erythematous joints.  Neuro– Alert and oriented x 3, CN 2-12 grossly intact.

## 2025-06-18 NOTE — ED PROVIDER NOTE - CLINICAL SUMMARY MEDICAL DECISION MAKING FREE TEXT BOX
Patient presenting reporting multiple days of not eating or drinking for fear of vomiting.  Exam at this time seems benign from a surgical standpoint.  Does have a mildly diminished affect.  Will obtain screening labs to evaluate for dehydration, CT chest abdomen pelvis to reevaluate for new malignancy or other explanation of persisting vomiting and nausea beginning earlier this week.

## 2025-06-18 NOTE — PATIENT PROFILE ADULT - FALL HARM RISK - RISK INTERVENTIONS

## 2025-06-18 NOTE — H&P ADULT - ASSESSMENT
65M PMH HTN, HLD, depression, and melanoma presenting with nausea and vomiting x 4 days. Admitted for severe hyponatremia     #hyponatremia  #adrenal insufficiency  #HTN  #HLD      =================== Neuro============================  AAOX3  no active issues      ================= Cardiovascular==========================  #HTN  takes losartan 100mg qd and amlodipine 10mg qd at home  will hold home meds at this time   /80  monitor BP, may resume HTN meds if BP is elevated      ================- Pulm=================================  no active issues    ==================ID===================================  no active issues    ================= Nephro================================  #severe hyponatremia  presenting with generalized weakness, nausea, vomiting (3-5 episodes of NBNB emesis daily) and lethargy x 4 days  Na 108  hyponatremia likely SIADH 2/2 possible cortisol crisis  takes hydrocortisone 10mg BID as per patient  will start solucortef 50q6 for now   will fluid restrict for now  f/u free cortisol level  f/u serum and urine osm  trend Na q4      =================GI====================================  #nausea and vomiting  2/2 severe hyponatremia  will give zofran PRN and correct underlying cause      ================ Heme==================================  no active issues    =================Endocrine===============================  #adrenal insufficiency  takes hydrocortisone 10mg BID at home  will start solucortef 50q6   f/u free cortisol level    ================= Skin/Catheters============================  Peripheral IV lines   Olivares catheter   Patient consented for A line and CVC     =================Prophylaxis =============================  DVT prophylaxis   GI prophylaxis     ==================GOC==================================  FULL CODE   Disposition      65M PMH HTN, HLD, depression, and melanoma presenting with nausea and vomiting x 4 days. Admitted to ICU for severe hyponatremia     #hyponatremia 2/2 SIADH?  #adrenal insufficiency  #HTN  #HLD  #melanoma    =================== Neuro============================  AAOX3  no active issues      ================= Cardiovascular==========================  #HTN  takes losartan 100mg qd and amlodipine 10mg qd at home  will hold home meds at this time   /80  monitor BP, may resume HTN meds if BP is elevated      ================- Pulm=================================  no active issues    ==================ID===================================  no active issues    ================= Nephro================================  #severe hyponatremia  presenting with generalized weakness, nausea, vomiting (3-5 episodes of NBNB emesis daily) and lethargy x 4 days  Na 108  hyponatremia likely SIADH 2/2 possible cortisol crisis  takes hydrocortisone 10mg BID as per patient  will start solucortef 50q6 for now   will fluid restrict for now  f/u free cortisol level  f/u serum and urine osm  trend Na q4      =================GI====================================  #nausea and vomiting  2/2 severe hyponatremia  will give zofran PRN and correct underlying cause      ================ Heme==================================  no active issues    =================Endocrine===============================  #adrenal insufficiency  takes hydrocortisone 10mg BID at home  will start solucortef 50q6   f/u free cortisol level    ================= Skin/Catheters============================  Peripheral IV lines   Olivares catheter   Patient consented for A line and CVC     =================Prophylaxis =============================  DVT prophylaxis   GI prophylaxis     ==================GOC==================================  FULL CODE   Disposition

## 2025-06-18 NOTE — ED PROVIDER NOTE - OBJECTIVE STATEMENT
65-year-old male with a past medical history of hypertension, hyperlipidemia, depression and melanoma not currently on treatment presenting reporting generalized weakness, decreased appetite and nausea over the past 3 to 5 days.  Reports that he was having a lot of nauseousness and vomiting without abdominal pain.  After that resolved he said he did not really want to try eating or drinking anything because he was afraid of vomiting further.  He denies any abnormal bowel movements.  He reports he saw his doctor about 2 weeks ago and everything was normal at that time although he has not had labs in a while.  He has not had any imaging in recent years to reevaluate his history of malignancy

## 2025-06-18 NOTE — ED ADULT NURSE NOTE - NSFALLRISKINTERV_ED_ALL_ED

## 2025-06-19 LAB
ALBUMIN SERPL ELPH-MCNC: 3.6 G/DL — SIGNIFICANT CHANGE UP (ref 3.5–5)
ALP SERPL-CCNC: 66 U/L — SIGNIFICANT CHANGE UP (ref 40–120)
ALT FLD-CCNC: 71 U/L DA — HIGH (ref 10–60)
ANION GAP SERPL CALC-SCNC: 11 MMOL/L — SIGNIFICANT CHANGE UP (ref 5–17)
ANION GAP SERPL CALC-SCNC: 6 MMOL/L — SIGNIFICANT CHANGE UP (ref 5–17)
ANION GAP SERPL CALC-SCNC: 7 MMOL/L — SIGNIFICANT CHANGE UP (ref 5–17)
ANION GAP SERPL CALC-SCNC: 7 MMOL/L — SIGNIFICANT CHANGE UP (ref 5–17)
ANION GAP SERPL CALC-SCNC: 9 MMOL/L — SIGNIFICANT CHANGE UP (ref 5–17)
ANION GAP SERPL CALC-SCNC: 9 MMOL/L — SIGNIFICANT CHANGE UP (ref 5–17)
AST SERPL-CCNC: 48 U/L — HIGH (ref 10–40)
BASOPHILS # BLD AUTO: 0.01 K/UL — SIGNIFICANT CHANGE UP (ref 0–0.2)
BASOPHILS NFR BLD AUTO: 0.1 % — SIGNIFICANT CHANGE UP (ref 0–2)
BILIRUB SERPL-MCNC: 0.6 MG/DL — SIGNIFICANT CHANGE UP (ref 0.2–1.2)
BUN SERPL-MCNC: 11 MG/DL — SIGNIFICANT CHANGE UP (ref 7–18)
BUN SERPL-MCNC: 12 MG/DL — SIGNIFICANT CHANGE UP (ref 7–18)
BUN SERPL-MCNC: 12 MG/DL — SIGNIFICANT CHANGE UP (ref 7–18)
BUN SERPL-MCNC: 7 MG/DL — SIGNIFICANT CHANGE UP (ref 7–18)
BUN SERPL-MCNC: 8 MG/DL — SIGNIFICANT CHANGE UP (ref 7–18)
CALCIUM SERPL-MCNC: 8.6 MG/DL — SIGNIFICANT CHANGE UP (ref 8.4–10.5)
CALCIUM SERPL-MCNC: 8.9 MG/DL — SIGNIFICANT CHANGE UP (ref 8.4–10.5)
CALCIUM SERPL-MCNC: 8.9 MG/DL — SIGNIFICANT CHANGE UP (ref 8.4–10.5)
CALCIUM SERPL-MCNC: 9.1 MG/DL — SIGNIFICANT CHANGE UP (ref 8.4–10.5)
CALCIUM SERPL-MCNC: 9.1 MG/DL — SIGNIFICANT CHANGE UP (ref 8.4–10.5)
CALCIUM SERPL-MCNC: 9.2 MG/DL — SIGNIFICANT CHANGE UP (ref 8.4–10.5)
CHLORIDE SERPL-SCNC: 84 MMOL/L — LOW (ref 96–108)
CHLORIDE SERPL-SCNC: 84 MMOL/L — LOW (ref 96–108)
CHLORIDE SERPL-SCNC: 85 MMOL/L — LOW (ref 96–108)
CHLORIDE SERPL-SCNC: 87 MMOL/L — LOW (ref 96–108)
CHLORIDE SERPL-SCNC: 88 MMOL/L — LOW (ref 96–108)
CHLORIDE SERPL-SCNC: 92 MMOL/L — LOW (ref 96–108)
CO2 SERPL-SCNC: 19 MMOL/L — LOW (ref 22–31)
CO2 SERPL-SCNC: 19 MMOL/L — LOW (ref 22–31)
CO2 SERPL-SCNC: 20 MMOL/L — LOW (ref 22–31)
CO2 SERPL-SCNC: 21 MMOL/L — LOW (ref 22–31)
CO2 SERPL-SCNC: 21 MMOL/L — LOW (ref 22–31)
CO2 SERPL-SCNC: 22 MMOL/L — SIGNIFICANT CHANGE UP (ref 22–31)
CREAT SERPL-MCNC: 0.69 MG/DL — SIGNIFICANT CHANGE UP (ref 0.5–1.3)
CREAT SERPL-MCNC: 0.7 MG/DL — SIGNIFICANT CHANGE UP (ref 0.5–1.3)
CREAT SERPL-MCNC: 0.76 MG/DL — SIGNIFICANT CHANGE UP (ref 0.5–1.3)
CREAT SERPL-MCNC: 0.89 MG/DL — SIGNIFICANT CHANGE UP (ref 0.5–1.3)
CREAT SERPL-MCNC: 0.89 MG/DL — SIGNIFICANT CHANGE UP (ref 0.5–1.3)
CREAT SERPL-MCNC: 0.99 MG/DL — SIGNIFICANT CHANGE UP (ref 0.5–1.3)
EGFR: 100 ML/MIN/1.73M2 — SIGNIFICANT CHANGE UP
EGFR: 100 ML/MIN/1.73M2 — SIGNIFICANT CHANGE UP
EGFR: 102 ML/MIN/1.73M2 — SIGNIFICANT CHANGE UP
EGFR: 102 ML/MIN/1.73M2 — SIGNIFICANT CHANGE UP
EGFR: 103 ML/MIN/1.73M2 — SIGNIFICANT CHANGE UP
EGFR: 103 ML/MIN/1.73M2 — SIGNIFICANT CHANGE UP
EGFR: 85 ML/MIN/1.73M2 — SIGNIFICANT CHANGE UP
EGFR: 85 ML/MIN/1.73M2 — SIGNIFICANT CHANGE UP
EGFR: 95 ML/MIN/1.73M2 — SIGNIFICANT CHANGE UP
EOSINOPHIL # BLD AUTO: 0 K/UL — SIGNIFICANT CHANGE UP (ref 0–0.5)
EOSINOPHIL NFR BLD AUTO: 0 % — SIGNIFICANT CHANGE UP (ref 0–6)
GLUCOSE SERPL-MCNC: 111 MG/DL — HIGH (ref 70–99)
GLUCOSE SERPL-MCNC: 111 MG/DL — HIGH (ref 70–99)
GLUCOSE SERPL-MCNC: 114 MG/DL — HIGH (ref 70–99)
GLUCOSE SERPL-MCNC: 126 MG/DL — HIGH (ref 70–99)
GLUCOSE SERPL-MCNC: 98 MG/DL — SIGNIFICANT CHANGE UP (ref 70–99)
GLUCOSE SERPL-MCNC: 99 MG/DL — SIGNIFICANT CHANGE UP (ref 70–99)
HCT VFR BLD CALC: SIGNIFICANT CHANGE UP (ref 39–50)
HGB BLD-MCNC: 13.5 G/DL — SIGNIFICANT CHANGE UP (ref 13–17)
IMM GRANULOCYTES NFR BLD AUTO: 0.4 % — SIGNIFICANT CHANGE UP (ref 0–0.9)
LYMPHOCYTES # BLD AUTO: 0.7 K/UL — LOW (ref 1–3.3)
LYMPHOCYTES # BLD AUTO: 9.1 % — LOW (ref 13–44)
MAGNESIUM SERPL-MCNC: 2.1 MG/DL — SIGNIFICANT CHANGE UP (ref 1.6–2.6)
MCHC RBC-ENTMCNC: SIGNIFICANT CHANGE UP (ref 27–34)
MCHC RBC-ENTMCNC: SIGNIFICANT CHANGE UP (ref 32–36)
MCV RBC AUTO: SIGNIFICANT CHANGE UP (ref 80–100)
MONOCYTES # BLD AUTO: 0.43 K/UL — SIGNIFICANT CHANGE UP (ref 0–0.9)
MONOCYTES NFR BLD AUTO: 5.6 % — SIGNIFICANT CHANGE UP (ref 2–14)
NEUTROPHILS # BLD AUTO: 6.56 K/UL — SIGNIFICANT CHANGE UP (ref 1.8–7.4)
NEUTROPHILS NFR BLD AUTO: 84.8 % — HIGH (ref 43–77)
NRBC BLD AUTO-RTO: 0 /100 WBCS — SIGNIFICANT CHANGE UP (ref 0–0)
PHOSPHATE SERPL-MCNC: 2.8 MG/DL — SIGNIFICANT CHANGE UP (ref 2.5–4.5)
PHOSPHATE SERPL-MCNC: 3 MG/DL — SIGNIFICANT CHANGE UP (ref 2.5–4.5)
PLATELET # BLD AUTO: 226 K/UL — SIGNIFICANT CHANGE UP (ref 150–400)
POTASSIUM SERPL-MCNC: 3.7 MMOL/L — SIGNIFICANT CHANGE UP (ref 3.5–5.3)
POTASSIUM SERPL-MCNC: 3.8 MMOL/L — SIGNIFICANT CHANGE UP (ref 3.5–5.3)
POTASSIUM SERPL-MCNC: 3.9 MMOL/L — SIGNIFICANT CHANGE UP (ref 3.5–5.3)
POTASSIUM SERPL-MCNC: 3.9 MMOL/L — SIGNIFICANT CHANGE UP (ref 3.5–5.3)
POTASSIUM SERPL-MCNC: 4.1 MMOL/L — SIGNIFICANT CHANGE UP (ref 3.5–5.3)
POTASSIUM SERPL-MCNC: 4.2 MMOL/L — SIGNIFICANT CHANGE UP (ref 3.5–5.3)
POTASSIUM SERPL-SCNC: 3.7 MMOL/L — SIGNIFICANT CHANGE UP (ref 3.5–5.3)
POTASSIUM SERPL-SCNC: 3.8 MMOL/L — SIGNIFICANT CHANGE UP (ref 3.5–5.3)
POTASSIUM SERPL-SCNC: 3.9 MMOL/L — SIGNIFICANT CHANGE UP (ref 3.5–5.3)
POTASSIUM SERPL-SCNC: 3.9 MMOL/L — SIGNIFICANT CHANGE UP (ref 3.5–5.3)
POTASSIUM SERPL-SCNC: 4.1 MMOL/L — SIGNIFICANT CHANGE UP (ref 3.5–5.3)
POTASSIUM SERPL-SCNC: 4.2 MMOL/L — SIGNIFICANT CHANGE UP (ref 3.5–5.3)
PROT SERPL-MCNC: 7.6 G/DL — SIGNIFICANT CHANGE UP (ref 6–8.3)
RBC # BLD: SIGNIFICANT CHANGE UP (ref 4.2–5.8)
RBC # FLD: SIGNIFICANT CHANGE UP (ref 10.3–14.5)
SODIUM SERPL-SCNC: 111 MMOL/L — CRITICAL LOW (ref 135–145)
SODIUM SERPL-SCNC: 112 MMOL/L — CRITICAL LOW (ref 135–145)
SODIUM SERPL-SCNC: 115 MMOL/L — CRITICAL LOW (ref 135–145)
SODIUM SERPL-SCNC: 117 MMOL/L — CRITICAL LOW (ref 135–145)
SODIUM SERPL-SCNC: 117 MMOL/L — CRITICAL LOW (ref 135–145)
SODIUM SERPL-SCNC: 119 MMOL/L — CRITICAL LOW (ref 135–145)
WBC # BLD: 7.73 K/UL — SIGNIFICANT CHANGE UP (ref 3.8–10.5)
WBC # FLD AUTO: 7.73 K/UL — SIGNIFICANT CHANGE UP (ref 3.8–10.5)

## 2025-06-19 PROCEDURE — 99223 1ST HOSP IP/OBS HIGH 75: CPT

## 2025-06-19 RX ORDER — SODIUM CHLORIDE 3 G/100ML
500 INJECTION, SOLUTION INTRAVENOUS
Refills: 0 | Status: DISCONTINUED | OUTPATIENT
Start: 2025-06-19 | End: 2025-06-19

## 2025-06-19 RX ORDER — ONDANSETRON HCL/PF 4 MG/2 ML
4 VIAL (ML) INJECTION DAILY
Refills: 0 | Status: DISCONTINUED | OUTPATIENT
Start: 2025-06-19 | End: 2025-06-27

## 2025-06-19 RX ORDER — SODIUM CHLORIDE 3 G/100ML
500 INJECTION, SOLUTION INTRAVENOUS
Refills: 0 | Status: DISCONTINUED | OUTPATIENT
Start: 2025-06-19 | End: 2025-06-20

## 2025-06-19 RX ORDER — SODIUM CHLORIDE 3 G/100ML
500 INJECTION, SOLUTION INTRAVENOUS
Refills: 0 | Status: COMPLETED | OUTPATIENT
Start: 2025-06-19 | End: 2025-06-19

## 2025-06-19 RX ADMIN — Medication 50 MILLIGRAM(S): at 05:34

## 2025-06-19 RX ADMIN — SODIUM CHLORIDE 30 MILLILITER(S): 3 INJECTION, SOLUTION INTRAVENOUS at 12:12

## 2025-06-19 RX ADMIN — SODIUM CHLORIDE 30 MILLILITER(S): 3 INJECTION, SOLUTION INTRAVENOUS at 17:48

## 2025-06-19 RX ADMIN — Medication 50 MILLIGRAM(S): at 11:20

## 2025-06-19 RX ADMIN — Medication 50 MILLIGRAM(S): at 17:28

## 2025-06-19 RX ADMIN — SODIUM CHLORIDE 30 MILLILITER(S): 3 INJECTION, SOLUTION INTRAVENOUS at 02:36

## 2025-06-19 RX ADMIN — Medication 1 APPLICATION(S): at 05:11

## 2025-06-19 RX ADMIN — ENOXAPARIN SODIUM 40 MILLIGRAM(S): 100 INJECTION SUBCUTANEOUS at 06:20

## 2025-06-19 NOTE — PROVIDER CONTACT NOTE (CRITICAL VALUE NOTIFICATION) - BACKGROUND
65M PMH HTN, HLD, depression, and melanoma presenting with nausea and vomiting x 4 days. Admitted to ICU for severe hyponatremia

## 2025-06-19 NOTE — DIETITIAN INITIAL EVALUATION ADULT - OTHER INFO
Patient was admitted with dx of hyponatremia, hypo-osmolarity, depression, PSH, HTN, hyperlipidemia, decreased appetite, n/v.
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
## 2025-06-19 NOTE — DIETITIAN INITIAL EVALUATION ADULT - NS FNS DIET ORDER
Diet, DASH/TLC:   Sodium & Cholesterol Restricted  800mL Fluid Restriction ([id number]) (06-19-25 @ 11:01)

## 2025-06-19 NOTE — PROGRESS NOTE ADULT - ASSESSMENT
65M PMH HTN, HLD, depression, and melanoma presenting with nausea and vomiting x 4 days. Admitted to ICU for severe hyponatremia     #hyponatremia 2/2 SIADH?  #adrenal insufficiency  #HTN  #HLD  #melanoma    =================== Neuro============================  AAOX3  no active issues      ================= Cardiovascular==========================  #HTN  takes losartan 100mg qd and amlodipine 10mg qd at home  will hold home meds at this time   /80  monitor BP, may resume HTN meds if BP is elevated      ================- Pulm=================================  no active issues    ==================ID===================================  no active issues    ================= Nephro================================  #severe hyponatremia  presenting with generalized weakness, nausea, vomiting (3-5 episodes of NBNB emesis daily) and lethargy x 4 days  Na 108  hyponatremia likely SIADH 2/2 possible cortisol crisis  takes hydrocortisone 10mg BID as per patient  will start solucortef 50q6 for now   will fluid restrict for now  f/u free cortisol level  f/u serum and urine osm  trend Na q4      =================GI====================================  #nausea and vomiting  2/2 severe hyponatremia  will give zofran PRN and correct underlying cause      ================ Heme==================================  no active issues    =================Endocrine===============================  #adrenal insufficiency  takes hydrocortisone 10mg BID at home  will start solucortef 50q6   f/u free cortisol level    ================= Skin/Catheters============================  Peripheral IV lines   Olivares catheter   Patient consented for A line and CVC     =================Prophylaxis =============================  DVT prophylaxis   GI prophylaxis     ==================GOC==================================  FULL CODE   Disposition      65M PMH HTN, HLD, depression, and melanoma presenting with nausea and vomiting x 4 days. Admitted to ICU for severe hyponatremia.    #hyponatremia 2/2 SIADH?  #adrenal insufficiency  #HTN  #HLD  #melanoma    =================== Neuro============================  AAOX4  no active issues      ================= Cardiovascular==========================  #HTN  takes losartan 100mg qd and amlodipine 10mg qd at home  will hold home meds at this time   /80  monitor BP, may resume HTN meds if BP is elevated      ================- Pulm=================================  no active issues    ==================ID===================================  no active issues    ================= Nephro================================  #severe hyponatremia  presenting with generalized weakness, nausea, vomiting (3-5 episodes of NBNB emesis daily) and lethargy x 4 days  Na 108  hyponatremia likely SIADH 2/2 possible cortisol crisis  takes hydrocortisone 10mg BID as per patient  will start solucortef 50q6 for now   will fluid restrict for now  f/u free cortisol level  f/u serum and urine osm  trend Na q4      =================GI====================================  #nausea and vomiting  2/2 severe hyponatremia  will give zofran PRN and correct underlying cause      ================ Heme==================================  no active issues    =================Endocrine===============================  #adrenal insufficiency  takes hydrocortisone 10mg BID at home  will start solucortef 50q6   f/u free cortisol level    ================= Skin/Catheters============================  Peripheral IV lines   Olivares catheter   Patient consented for A line and CVC     =================Prophylaxis =============================  DVT prophylaxis   GI prophylaxis     ==================GOC==================================  FULL CODE   Disposition      65M PMH HTN, HLD, depression, and melanoma presenting with nausea and vomiting x 4 days. Admitted to ICU for severe hyponatremia.    #hyponatremia 2/2 SIADH?  #adrenal insufficiency  #HTN  #HLD  #melanoma    =================== Neuro============================  AAOX4  no active issues      ================= Cardiovascular==========================  #HTN  takes losartan 100mg qd and amlodipine 10mg qd at home  will hold home meds at this time   /80  monitor BP, may resume HTN meds if BP is elevated      ================- Pulm=================================  no active issues    ==================ID===================================  no active issues    ================= Nephro================================  #severe hyponatremia  presenting with generalized weakness, nausea, vomiting (3-5 episodes of NBNB emesis daily) and lethargy x 4 days  Na 108  hyponatremia likely SIADH 2/2 possible cortisol crisis  takes hydrocortisone 10mg BID as per patient  will start solucortef 50q6 for now   will fluid restrict for now  f/u free cortisol level    - Na 115 (Goal 116)  - c/w 3%NS at 30cc/hr for 3 hours  - trend Na q4  - Endo consulted: Dr Syed      =================GI====================================  #nausea and vomiting  2/2 severe hyponatremia  will give zofran PRN and correct underlying cause    - tolerating full liquid diet, advanced to pureed and moderately thickened liquids      ================ Heme==================================  no active issues    =================Endocrine===============================  #adrenal insufficiency  takes hydrocortisone 10mg BID at home  will start solucortef 50q6     - f/u free cortisol level    ================= Skin/Catheters============================  Peripheral IV lines   Olivares catheter   Patient consented for A line and CVC     =================Prophylaxis =============================  DVT prophylaxis   GI prophylaxis     ==================GOC==================================  FULL CODE   Disposition

## 2025-06-19 NOTE — DIETITIAN INITIAL EVALUATION ADULT - PERTINENT LABORATORY DATA
06-19    115[LL]  |  85[L]  |  8   ----------------------------<  111[H]  3.9   |  19[L]  |  0.76    Ca    9.1      19 Jun 2025 08:18  Phos  2.8     06-19  Mg     2.1     06-19    TPro  7.6  /  Alb  3.6  /  TBili  0.6  /  DBili  x   /  AST  48[H]  /  ALT  71[H]  /  AlkPhos  66  06-19

## 2025-06-19 NOTE — CONSULT NOTE ADULT - ATTENDING COMMENTS
I agree with the resident progress note/outlined plan of care. I have the edited the above note as needed. My independent findings and conclusions are documented.    Patient is known to endocrine service from outpatient   Patient was diagnosed with melanoma and had completed twelfth month of immunotherapy s/p combination ipilimumab-nivolumab followed by 9 months of nivolumab. Last Rx was in Sept 2019. Patient then develop severe fatigue and found to have secondary adrenal insufficiency. Was started on high doses of hydrocortisone initially and then eventually started taking Hydrocortisone 10 mg. Most recently patient was taking hydrocortisone 5 mg in the morning and 5 mg in the evening.   Patient seen at the bedside, has slowed mentation however alert and oriented, responding appropriately    Patient reports he had a viral cold illness for one week. He doubled the dose of hydrocortisone just for one day of his illness, and continue to take low doses of hydrocortisone during his illness. He then develop severe N/V abdominal pain and on admission to [facility] he was found to have serum sodium levels 108. Hyponatremia is likely due to SIADH secondary to hypocortisolism. However will also rule out overt hypothyroidism, given he had hx of subclinical hypothyroidism.   Check TSH  As mentioned above, if sodium continues to improve start hydrocortisone taper as above    Counselled patient extensively that he should double the dose of hydrocortisone to 30-40 mg every day during the times of sickness/surgery. Patient verbalized understanding and agrees with the plan

## 2025-06-19 NOTE — CONSULT NOTE ADULT - SUBJECTIVE AND OBJECTIVE BOX
ENDOCRINE INITIAL CONSULT NOTE:    Patient is a 65y old  Male who presents with a chief complaint of severe hyponatremia, cortisol crisis (19 Jun 2025 11:47)      HPI:  65M PMH HTN, HLD, depression, and melanoma presenting with nausea and vomiting x 4 days. He states that he had URI symptoms one week ago and since then has had decreased appetite. He reported that he had nausea and vomiting since Saturday, where he had 3-5 episodes of NBNB vomiting. He has had poor po intake since Saturday due to being nervous about feeling nauseous after, but has been drinking two to three 8 oz cups of water each day. He denies any dizziness, lightheadedness, blurry vision, or abdominal pain. He does report that he noticed his speech was slower than normal and that he is more lethargic. He denies missing any doses of his medications, including his hydrocortisone 10mg BID. He denies fevers, chills, CP, SOB, diarrhea, dysuria, numbness/tingling/weakness in extremities. He lives at home alone and ambulates independently.   (18 Jun 2025 14:06)    65y male with h/     Diabetes History:  Diagnosis:  Home regimen:  Home fingersticks/ CGM:  Hypoglycemia events:  Retinopathy/most recent opthalmology:  Peripheral Neuropathy:  Nephropathy:  Cardiovascular disease:  Peripheral vascular disease:  Diet:  Recent A1C   PCP  Endocrinologist:    Review of systems:  Constitutional:  Constitutional: No fever, weight loss, fatigue, low energy, generalized weakness, poor appetite  Eyes: No redness, no dryness, no pain, no tearing, no gritty or maninder feeling, no bulging  Cardiovascular/ Respiratory: No palpitations,, no chest pain, no shortness of breath, no exercise intolerance, no cough, no leg/ ankle swelling  Gastrointestinal: No trouble swallowing, no heart burn, no abdominal pain, no bloating, no nausea, no vomiting, no constipation, no diarrhea, no frequent bowel movements  Skin: No excessive hair growth, no hair loss, no acne, no excessive sweating, no rash, no easy bruising  Neurological: No headaches, no change in vision, no dizziness/ lightheadedness, no tremors, no numbness/ tingling in feet, no pain/ burning in feet, no trouble with balance, no muscular weakness.   Endocrine: Frequent urination, excessive urination, excessive thirst, symptoms     PAST MEDICAL & SURGICAL HISTORY:  Depression      No significant past surgical history          FAMILY HISTORY:  No pertinent family history in first degree relatives        Social History:  Occupation:  Marital status/Lives with  Exercise:  Tobacco:  Alcohol:  illicit drug abuse:  Health insurance status:    MEDICATIONS  (STANDING):  chlorhexidine 2% Cloths 1 Application(s) Topical <User Schedule>  enoxaparin Injectable 40 milliGRAM(s) SubCutaneous every 24 hours  hydrocortisone sodium succinate Injectable 50 milliGRAM(s) IV Push every 6 hours  sodium chloride 3%. 500 milliLiter(s) (30 mL/Hr) IV Continuous <Continuous>    MEDICATIONS  (PRN):  ondansetron    Tablet 4 milliGRAM(s) Oral daily PRN Nausea and/or Vomiting      Physical Examination  Vital Signs Last 24 Hrs  T(C): 36.1 (19 Jun 2025 09:00), Max: 36.6 (18 Jun 2025 16:00)  T(F): 97 (19 Jun 2025 09:00), Max: 97.8 (18 Jun 2025 16:00)  HR: 80 (19 Jun 2025 12:00) (73 - 112)  BP: 113/61 (19 Jun 2025 12:00) (86/54 - 148/82)  BP(mean): 76 (19 Jun 2025 12:00) (65 - 100)  RR: 18 (19 Jun 2025 12:00) (11 - 24)  SpO2: 96% (19 Jun 2025 12:00) (93% - 100%)    Parameters below as of 19 Jun 2025 05:00  Patient On (Oxygen Delivery Method): room air      Constitutional: No acute distress, ill- appearing, no anxious appearing, hyperkinetic, no diaphoretic  HEENT: Moist mucous membranes  Neck:  No JVD, bruits or thyromegaly, No thyroid nodules palpable, no LAD  Respiratory:  Respiratory effort normal, lungs clear to ausculation, without rales or rhonchi  Cardiovascular:  Regular heart rate, normal S1 and S2 sounds, without murmur, rub or gallop.  Gastrointestinal: Soft, non tender without hepatosplenomegaly and masses, no abdominal obesity  Extremities: Sensation intact to monofilament in feet, no cyanosis, clubbing or edema, positive pedal pulses  Neurological:  Oriented to person, place and time, No gross sensory or motor defects, visual fields intact to confrontation, normal deep tendon reflexes    Labs:                        13.5   7.73  )-----------( 226      ( 19 Jun 2025 03:20 )             See note    06-19    117[LL]  |  87[L]  |  11  ----------------------------<  126[H]  3.7   |  21[L]  |  0.89    Ca    8.9      19 Jun 2025 12:40  Phos  2.8     06-19  Mg     2.1     06-19    TPro  7.6  /  Alb  3.6  /  TBili  0.6  /  DBili  x   /  AST  48[H]  /  ALT  71[H]  /  AlkPhos  66  06-19          Urinalysis Basic - ( 19 Jun 2025 12:40 )    Color: x / Appearance: x / SG: x / pH: x  Gluc: 126 mg/dL / Ketone: x  / Bili: x / Urobili: x   Blood: x / Protein: x / Nitrite: x   Leuk Esterase: x / RBC: x / WBC x   Sq Epi: x / Non Sq Epi: x / Bacteria: x      CAPILLARY BLOOD GLUCOSE          Radiology and diagnostic studies:      Assessment and Plan:  65y Male   Endocrinology is consulted fro    1) Type 2 diabetes:      Recommendations:  Basal Insulin:   Glargine ( Lantus) units once daily    Nutritional Insulin:   Lispro (Admelog) units with breakfast, Hold if NPO or eating <50% of meals  Lispro ( Admelog) units with lunch, Hold if NPO or eating < 50% of meals  Lispro (Admelog) units with dinner, Hold if NPO or eating < 50% of meals    Correctional Insulin:  Normal Lispro ( Admelog) correctional scale with meals and bedtime    Oral Diabetes Medications:  Non in the hospital     ENDOCRINE INITIAL CONSULT NOTE:    Patient is a 65y old  Male who presents with a chief complaint of severe hyponatremia, cortisol crisis (19 Jun 2025 11:47)      HPI:  65M PMH HTN, HLD, depression, and melanoma presenting with nausea and vomiting x 4 days. He states that he had URI symptoms one week ago and since then has had decreased appetite. He reported that he had nausea and vomiting since Saturday, where he had 3-5 episodes of NBNB vomiting. He has had poor po intake since Saturday due to being nervous about feeling nauseous after, but has been drinking two to three 8 oz cups of water each day. He denies any dizziness, lightheadedness, blurry vision, or abdominal pain. He does report that he noticed his speech was slower than normal and that he is more lethargic. He denies missing any doses of his medications, including his hydrocortisone 10mg BID. He denies fevers, chills, CP, SOB, diarrhea, dysuria, numbness/tingling/weakness in extremities. He lives at home alone and ambulates independently.   (18 Jun 2025 14:06)    65y male with h/o Chronic adrenal insufficiency, HTN, HLD, Depression (not on any meds), Melanoma, presented with 4-5 days of NBNB vomiting. Also c/o generalised weakness and increased fatigue for past couple of weeks. States he had URI symptoms a week ago, with decreased appetite, mostly on liquid diet at home. Denies any lightheadedness, dizziness, abdominal pain, muscle cramps. States he is on losartan, amlodipine and Hydrocortisone 5mg BID at home, compliant with all his meds.    PCP: Dr Nowak  Endocrinologist: Dr Syed    Review of systems:    Constitutional: No fever, No weight loss, +fatigue, +low energy, +generalized weakness, +poor appetite  Eyes: No redness, no dryness, no pain  Cardiovascular/ Respiratory: No palpitations, no chest pain, no shortness of breath, no exercise intolerance, no cough, no leg/ ankle swelling  Gastrointestinal: No trouble swallowing, no heart burn, no abdominal pain, no bloating, no nausea, no vomiting, no constipation, no diarrhea, no frequent bowel movements  Skin: No excessive hair growth, no hair loss, no acne, no excessive sweating, no rash, no easy bruising  Neurological: No headaches, no change in vision, no dizziness/ lightheadedness, no tremors, no numbness/ tingling in feet, no pain/ burning in feet, no trouble with balance, no muscular weakness.   Endocrine: Frequent urination, excessive urination, excessive thirst, symptoms     PAST MEDICAL & SURGICAL HISTORY:  Depression      No significant past surgical history          FAMILY HISTORY:  No pertinent family history in first degree relatives        Social History:  Occupation:  Marital status/Lives with  Exercise:  Tobacco:  Alcohol:  illicit drug abuse:  Health insurance status:    MEDICATIONS  (STANDING):  chlorhexidine 2% Cloths 1 Application(s) Topical <User Schedule>  enoxaparin Injectable 40 milliGRAM(s) SubCutaneous every 24 hours  hydrocortisone sodium succinate Injectable 50 milliGRAM(s) IV Push every 6 hours  sodium chloride 3%. 500 milliLiter(s) (30 mL/Hr) IV Continuous <Continuous>    MEDICATIONS  (PRN):  ondansetron    Tablet 4 milliGRAM(s) Oral daily PRN Nausea and/or Vomiting      Physical Examination  Vital Signs Last 24 Hrs  T(C): 36.1 (19 Jun 2025 09:00), Max: 36.6 (18 Jun 2025 16:00)  T(F): 97 (19 Jun 2025 09:00), Max: 97.8 (18 Jun 2025 16:00)  HR: 80 (19 Jun 2025 12:00) (73 - 112)  BP: 113/61 (19 Jun 2025 12:00) (86/54 - 148/82)  BP(mean): 76 (19 Jun 2025 12:00) (65 - 100)  RR: 18 (19 Jun 2025 12:00) (11 - 24)  SpO2: 96% (19 Jun 2025 12:00) (93% - 100%)    Parameters below as of 19 Jun 2025 05:00  Patient On (Oxygen Delivery Method): room air      Constitutional: No acute distress, ill- appearing, no anxious appearing, hyperkinetic, no diaphoretic  HEENT: Moist mucous membranes  Neck:  No JVD, bruits or thyromegaly, No thyroid nodules palpable, no LAD  Respiratory:  Respiratory effort normal, lungs clear to ausculation, without rales or rhonchi  Cardiovascular:  Regular heart rate, normal S1 and S2 sounds, without murmur, rub or gallop.  Gastrointestinal: Soft, non tender without hepatosplenomegaly and masses, no abdominal obesity  Extremities: Sensation intact to monofilament in feet, no cyanosis, clubbing or edema, positive pedal pulses  Neurological:  Oriented to person, place and time, No gross sensory or motor defects, visual fields intact to confrontation, normal deep tendon reflexes    Labs:                        13.5   7.73  )-----------( 226      ( 19 Jun 2025 03:20 )             See note    06-19    117[LL]  |  87[L]  |  11  ----------------------------<  126[H]  3.7   |  21[L]  |  0.89    Ca    8.9      19 Jun 2025 12:40  Phos  2.8     06-19  Mg     2.1     06-19    TPro  7.6  /  Alb  3.6  /  TBili  0.6  /  DBili  x   /  AST  48[H]  /  ALT  71[H]  /  AlkPhos  66  06-19          Urinalysis Basic - ( 19 Jun 2025 12:40 )    Color: x / Appearance: x / SG: x / pH: x  Gluc: 126 mg/dL / Ketone: x  / Bili: x / Urobili: x   Blood: x / Protein: x / Nitrite: x   Leuk Esterase: x / RBC: x / WBC x   Sq Epi: x / Non Sq Epi: x / Bacteria: x      CAPILLARY BLOOD GLUCOSE          Radiology and diagnostic studies:      Assessment and Plan:  65y Male   Endocrinology is consulted fro    1) Type 2 diabetes:      Recommendations:  Basal Insulin:   Glargine ( Lantus) units once daily    Nutritional Insulin:   Lispro (Admelog) units with breakfast, Hold if NPO or eating <50% of meals  Lispro ( Admelog) units with lunch, Hold if NPO or eating < 50% of meals  Lispro (Admelog) units with dinner, Hold if NPO or eating < 50% of meals    Correctional Insulin:  Normal Lispro ( Admelog) correctional scale with meals and bedtime    Oral Diabetes Medications:  Non in the hospital     ENDOCRINE INITIAL CONSULT NOTE:    Patient is a 65y old  Male who presents with a chief complaint of severe hyponatremia, cortisol crisis (19 Jun 2025 11:47)      HPI:  65M PMH HTN, HLD, depression, and melanoma presenting with nausea and vomiting x 4 days. He states that he had URI symptoms one week ago and since then has had decreased appetite. He reported that he had nausea and vomiting since Saturday, where he had 3-5 episodes of NBNB vomiting. He has had poor po intake since Saturday due to being nervous about feeling nauseous after, but has been drinking two to three 8 oz cups of water each day. He denies any dizziness, lightheadedness, blurry vision, or abdominal pain. He does report that he noticed his speech was slower than normal and that he is more lethargic. He denies missing any doses of his medications, including his hydrocortisone 10mg BID. He denies fevers, chills, CP, SOB, diarrhea, dysuria, numbness/tingling/weakness in extremities. He lives at home alone and ambulates independently.   (18 Jun 2025 14:06)    65y male with h/o Chronic secondary adrenal insufficiency, Subclinical Hypothyroidism, HTN, HLD, Depression (not on any meds), Melanoma, presented with 4-5 days of NBNB vomiting. Also c/o generalised weakness and increased fatigue for past couple of weeks. States he had URI symptoms a week ago, with decreased appetite, mostly on liquid diet at home. Denies any lightheadedness, dizziness, abdominal pain, muscle cramps. States he is on losartan, amlodipine and Hydrocortisone 5mg BID at home, compliant with all his meds.    PCP: Dr Nowak  Endocrinologist: Dr ySed    Review of systems:    Constitutional: No fever, No weight loss, +fatigue, +low energy, +generalized weakness, +poor appetite  Eyes: No redness, no dryness, no pain  Cardiovascular/ Respiratory: No palpitations, no chest pain, no shortness of breath, no exercise intolerance, no cough, no leg/ ankle swelling  Gastrointestinal: No trouble swallowing, no heart burn, no abdominal pain, no bloating, no nausea, no vomiting, no constipation, no diarrhea, no frequent bowel movements  Skin: No excessive hair growth, no hair loss, no acne, no excessive sweating, no rash, no easy bruising  Neurological: No headaches, no change in vision, no dizziness/ lightheadedness, no tremors, no numbness/ tingling in feet, no pain/ burning in feet, no muscular weakness.   Endocrine: Frequent urination, excessive urination, excessive thirst, symptoms     Past Medical and Surgical History:  Depression      No significant past surgical history          Family History:  No pertinent family history in first degree relatives        Social History:  Occupation:  Marital status/Lives with  Exercise:  Tobacco:   Alcohol:  illicit drug abuse:  Health insurance status:    Medications (Standing):  chlorhexidine 2% Cloths 1 Application(s) Topical <User Schedule>  enoxaparin Injectable 40 milliGRAM(s) SubCutaneous every 24 hours  hydrocortisone sodium succinate Injectable 50 milliGRAM(s) IV Push every 6 hours  sodium chloride 3%. 500 milliLiter(s) (30 mL/Hr) IV Continuous <Continuous>    Medications (prn):  ondansetron    Tablet 4 milliGRAM(s) Oral daily PRN Nausea and/or Vomiting      Physical Examination  Vital Signs Last 24 Hrs  T(C): 36.1 (19 Jun 2025 09:00), Max: 36.6 (18 Jun 2025 16:00)  T(F): 97 (19 Jun 2025 09:00), Max: 97.8 (18 Jun 2025 16:00)  HR: 80 (19 Jun 2025 12:00) (73 - 112)  BP: 113/61 (19 Jun 2025 12:00) (86/54 - 148/82)  BP(mean): 76 (19 Jun 2025 12:00) (65 - 100)  RR: 18 (19 Jun 2025 12:00) (11 - 24)  SpO2: 96% (19 Jun 2025 12:00) (93% - 100%)    Parameters below as of 19 Jun 2025 05:00  Patient On (Oxygen Delivery Method): room air      Constitutional: No acute distress, no anxious appearing, no diaphoretic  HEENT: Moist mucous membranes  Neck:  No JVD, bruits or thyromegaly, No thyroid nodules palpable, no LAD  Respiratory:  Respiratory effort normal, lungs clear to ausculation, without rales or rhonchi  Cardiovascular:  Regular heart rate, normal S1 and S2 sounds, without murmur, rub or gallop.  Gastrointestinal: Soft, non tender without hepatosplenomegaly and masses, no abdominal obesity  Extremities: Sensation intact, no cyanosis, clubbing or edema, positive pedal pulses  Neurological:  Oriented to person, place and time, No gross sensory or motor defects, visual fields intact to confrontation, normal deep tendon reflexes    Labs:                        13.5   7.73  )-----------( 226      ( 19 Jun 2025 03:20 )             See note    06-19    117[LL]  |  87[L]  |  11  ----------------------------<  126[H]  3.7   |  21[L]  |  0.89    Ca    8.9      19 Jun 2025 12:40  Phos  2.8     06-19  Mg     2.1     06-19    TPro  7.6  /  Alb  3.6  /  TBili  0.6  /  DBili  x   /  AST  48[H]  /  ALT  71[H]  /  AlkPhos  66  06-19          Urinalysis Basic - ( 19 Jun 2025 12:40 )    Color: x / Appearance: x / SG: x / pH: x  Gluc: 126 mg/dL / Ketone: x  / Bili: x / Urobili: x   Blood: x / Protein: x / Nitrite: x   Leuk Esterase: x / RBC: x / WBC x   Sq Epi: x / Non Sq Epi: x / Bacteria: x      CAPILLARY BLOOD GLUCOSE          Radiology and diagnostic studies:      Assessment and Plan:  65y male with h/o Chronic secondary adrenal insufficiency, Subclinical Hypothyroidism, HTN, HLD, Depression (not on any meds), Melanoma, presented with 4-5 days of NBNB vomiting. Found to have Na 108. Admitted to ICU for severe hyponatremia. Endocrinology consulted for Adrenal crisis.    1) Adrenal crisis  2) Chronic adrenal insufficiency  3) Subclinical hypothyroidism    -Continue with Hydrocortisone 50q6 until Na wnl, then taper.  Taper to 50q8 for 2 days, then  Taper to 50q12 for 2 days, then  Taper to 25q12 for 2 days, then  Taper to 50q12 for 2 days, then  Taper to 20q12 for 2 days, then  Taper to 15q12 for 2 days, then  Taper to 10q12 for 2 days  -Change to PO Hydrocortisone if Na wnl  -Send TSH (pt has h/o subclinical hypothyroidism; may cause hypoNa)  -Recommend Nephrology consult           Endocrine initial consult note:    Patient is a 65y old  Male who presents with a chief complaint of severe hyponatremia, cortisol crisis (19 Jun 2025 11:47)    HPI:  65M PMH HTN, HLD, depression, Secondary adrenal insufficiency and melanoma presenting with nausea and vomiting x 4 days. He states that he had URI symptoms one week ago and since then has had decreased appetite. He reported that he had nausea and vomiting since Saturday, where he had 3-5 episodes of NBNB vomiting. He has had poor po intake since Saturday due to being nervous about feeling nauseous after, but has been drinking two to three 8 oz cups of water each day. He denies any dizziness, lightheadedness, blurry vision, or abdominal pain. He does report that he noticed his speech was slower than normal and that he is more lethargic. He denies missing any doses of his medications, including his hydrocortisone 10mg BID. He denies fevers, chills, CP, SOB, diarrhea, dysuria, numbness/tingling/weakness in extremities. He lives at home alone and ambulates independently. (18 Jun 2025 14:06)    65y male with h/o Chronic secondary adrenal insufficiency, Subclinical Hypothyroidism, HTN, HLD, Depression (not on any meds), Melanoma, presented with 4-5 days of NBNB vomiting. Also c/o generalized weakness and increased fatigue for past couple of weeks. States he had URI symptoms a week ago, with decreased appetite, mostly on liquid diet at home. Denies any lightheadedness, dizziness, abdominal pain, muscle cramps. States he is on losartan, amlodipine and Hydrocortisone 5mg BID at home, compliant with all his meds.    PCP: Dr Nowak  Endocrinologist: Dr Syed    Review of systems:  Constitutional: No fever, No weight loss, +fatigue, +low energy, +generalized weakness, +poor appetite  Eyes: No redness, no dryness, no pain  Cardiovascular/ Respiratory: No palpitations, no chest pain, no shortness of breath, no exercise intolerance, no cough, no leg/ ankle swelling  Gastrointestinal: No trouble swallowing, no heart burn, no abdominal pain, no bloating, no nausea, no vomiting, no constipation, no diarrhea, no frequent bowel movements  Neurological: No headaches, no change in vision, no dizziness/ lightheadedness, no tremors, no numbness/ tingling in feet, no pain/ burning in feet, no muscular weakness.   Endocrine: No Frequent urination, excessive urination, excessive thirst, symptoms     Past Medical and Surgical History:  Depression      No significant past surgical history      Family History:  No pertinent family history in first degree relatives      Social History:  Tobacco:  Denies  Alcohol: Denies  illicit drug abuse: Denies      Medications (Standing):  chlorhexidine 2% Cloths 1 Application(s) Topical <User Schedule>  enoxaparin Injectable 40 milliGRAM(s) SubCutaneous every 24 hours  hydrocortisone sodium succinate Injectable 50 milliGRAM(s) IV Push every 6 hours  sodium chloride 3%. 500 milliLiter(s) (30 mL/Hr) IV Continuous <Continuous>    Medications (prn):  ondansetron    Tablet 4 milliGRAM(s) Oral daily PRN Nausea and/or Vomiting      Physical Examination  Vital Signs Last 24 Hrs  T(C): 36.1 (19 Jun 2025 09:00), Max: 36.6 (18 Jun 2025 16:00)  T(F): 97 (19 Jun 2025 09:00), Max: 97.8 (18 Jun 2025 16:00)  HR: 80 (19 Jun 2025 12:00) (73 - 112)  BP: 113/61 (19 Jun 2025 12:00) (86/54 - 148/82)  BP(mean): 76 (19 Jun 2025 12:00) (65 - 100)  RR: 18 (19 Jun 2025 12:00) (11 - 24)  SpO2: 96% (19 Jun 2025 12:00) (93% - 100%)    Parameters below as of 19 Jun 2025 05:00  Patient On (Oxygen Delivery Method): room air      Constitutional: No acute distress, no anxious appearing, no diaphoretic  HEENT: Moist mucous membranes  Neck:  No JVD, bruits or thyromegaly, No thyroid nodules palpable, no LAD  Respiratory:  Respiratory effort normal, lungs clear to ausculation, without rales or rhonchi  Cardiovascular:  Regular heart rate, normal S1 and S2 sounds, without murmur, rub or gallop.  Gastrointestinal: Soft, non tender without hepatosplenomegaly and masses, no abdominal obesity  Extremities: Sensation intact, no cyanosis, clubbing or edema, positive pedal pulses  Neurological:  Oriented to person, place and time    Labs:                     13.5   7.73  )-----------( 226      ( 19 Jun 2025 03:20 )             See note    06-19  117[LL]  |  87[L]  |  11  ----------------------------<  126[H]  3.7   |  21[L]  |  0.89  Ca    8.9      19 Jun 2025 12:40  Phos  2.8     06-19  Mg     2.1     06-19    TPro  7.6  /  Alb  3.6  /  TBili  0.6  /  DBili  x   /  AST  48[H]  /  ALT  71[H]  /  AlkPhos  66  06-19        Assessment and Plan:  65y male with h/o Chronic secondary adrenal insufficiency, Subclinical Hypothyroidism, HTN, HLD, Depression (not on any meds), Melanoma, presented with 4-5 days of NBNB vomiting. Found to have Na 108. Admitted to ICU for severe hyponatremia. Endocrinology consulted for Adrenal crisis.    1) Adrenal crisis  2) Chronic adrenal insufficiency  3) Subclinical hypothyroidism    -Continue with Hydrocortisone 50q6 today  until Na keeps trending up and continues to normalize, then taper.  Taper to 50q8 for 2 days, then  Taper to 50q12 for 2 days, then  Taper to 25q12 for 2 days, then  Taper to 15q12 for 2 days, then  Taper to 10q12 for 2 days  then continue hydrocortisone 10 mg q 12 hour lifelong  -Change to PO Hydrocortisone when patient's Na is wnl and BP remain stable  -Send TSH (pt has h/o subclinical hypothyroidism; may cause hypoNa)  -Hyponatremia MX per ICU team         Endocrine initial consult note:    Patient is a 65y old  Male who presents with a chief complaint of severe hyponatremia, cortisol crisis (19 Jun 2025 11:47)    HPI:  65M PMH HTN, HLD, depression, Secondary adrenal insufficiency and melanoma presenting with nausea and vomiting x 4 days. He states that he had URI symptoms one week ago and since then has had decreased appetite. He reported that he had nausea and vomiting since Saturday, where he had 3-5 episodes of NBNB vomiting. He has had poor po intake since Saturday due to being nervous about feeling nauseous after, but has been drinking two to three 8 oz cups of water each day. He denies any dizziness, lightheadedness, blurry vision, or abdominal pain. He does report that he noticed his speech was slower than normal and that he is more lethargic. He denies missing any doses of his medications, including his hydrocortisone 10mg BID. He denies fevers, chills, CP, SOB, diarrhea, dysuria, numbness/tingling/weakness in extremities. He lives at home alone and ambulates independently. (18 Jun 2025 14:06)    65y male with h/o Chronic secondary adrenal insufficiency, Subclinical Hypothyroidism, HTN, HLD, Depression (not on any meds), Melanoma, presented with 4-5 days of NBNB vomiting. Also c/o generalized weakness and increased fatigue for past couple of weeks. States he had URI symptoms a week ago, with decreased appetite, mostly on liquid diet at home. Denies any lightheadedness, dizziness, abdominal pain, muscle cramps. States he is on losartan, amlodipine and Hydrocortisone 5mg BID at home, compliant with all his meds.    PCP: Dr Nowak  Endocrinologist: Dr Syed    Review of systems:  Constitutional: No fever, No weight loss, +fatigue, +low energy, +generalized weakness, +poor appetite  Eyes: No redness, no dryness, no pain  Cardiovascular/ Respiratory: No palpitations, no chest pain, no shortness of breath, no exercise intolerance, no cough, no leg/ ankle swelling  Gastrointestinal: No trouble swallowing, no heart burn, no abdominal pain, no bloating, no nausea, no vomiting, no constipation, no diarrhea, no frequent bowel movements  Neurological: No headaches, no change in vision, no dizziness/ lightheadedness, no tremors, no numbness/ tingling in feet, no pain/ burning in feet, no muscular weakness.   Endocrine: No Frequent urination, excessive urination, excessive thirst, symptoms     Past Medical and Surgical History:  Depression      No significant past surgical history      Family History:  No pertinent family history in first degree relatives      Social History:  Tobacco:  Denies  Alcohol: Denies  illicit drug abuse: Denies      Medications (Standing):  chlorhexidine 2% Cloths 1 Application(s) Topical <User Schedule>  enoxaparin Injectable 40 milliGRAM(s) SubCutaneous every 24 hours  hydrocortisone sodium succinate Injectable 50 milliGRAM(s) IV Push every 6 hours  sodium chloride 3%. 500 milliLiter(s) (30 mL/Hr) IV Continuous <Continuous>    Medications (prn):  ondansetron    Tablet 4 milliGRAM(s) Oral daily PRN Nausea and/or Vomiting      Physical Examination  Vital Signs Last 24 Hrs  T(C): 36.1 (19 Jun 2025 09:00), Max: 36.6 (18 Jun 2025 16:00)  T(F): 97 (19 Jun 2025 09:00), Max: 97.8 (18 Jun 2025 16:00)  HR: 80 (19 Jun 2025 12:00) (73 - 112)  BP: 113/61 (19 Jun 2025 12:00) (86/54 - 148/82)  BP(mean): 76 (19 Jun 2025 12:00) (65 - 100)  RR: 18 (19 Jun 2025 12:00) (11 - 24)  SpO2: 96% (19 Jun 2025 12:00) (93% - 100%)    Parameters below as of 19 Jun 2025 05:00  Patient On (Oxygen Delivery Method): room air      Constitutional: No acute distress, no anxious appearing, no diaphoretic  HEENT: Moist mucous membranes  Neck:  No JVD, bruits or thyromegaly, No thyroid nodules palpable, no LAD  Respiratory:  Respiratory effort normal, lungs clear to ausculation, without rales or rhonchi  Cardiovascular:  Regular heart rate, normal S1 and S2 sounds, without murmur, rub or gallop.  Gastrointestinal: Soft, non tender without hepatosplenomegaly and masses, no abdominal obesity  Extremities: Sensation intact, no cyanosis, clubbing or edema, positive pedal pulses  Neurological:  Oriented to person, place and time    Labs:                     13.5   7.73  )-----------( 226      ( 19 Jun 2025 03:20 )             See note    06-19  117[LL]  |  87[L]  |  11  ----------------------------<  126[H]  3.7   |  21[L]  |  0.89  Ca    8.9      19 Jun 2025 12:40  Phos  2.8     06-19  Mg     2.1     06-19    TPro  7.6  /  Alb  3.6  /  TBili  0.6  /  DBili  x   /  AST  48[H]  /  ALT  71[H]  /  AlkPhos  66  06-19        Assessment and Plan:  65y male with h/o Chronic secondary adrenal insufficiency, Subclinical Hypothyroidism, HTN, HLD, Depression (not on any meds), Melanoma, presented with 4-5 days of NBNB vomiting. Found to have Na 108. Admitted to ICU for severe hyponatremia. Endocrinology consulted for Adrenal crisis.    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  Taper to 50q8 for 2 days, then  Taper to 50q12 for 2 days, then  Taper to 25q12 for 2 days, then  Taper to 15q12 for 2 days, then  Taper to 10q12 for 2 days  then continue hydrocortisone 10 mg q 12 hour lifelong  -Change to PO Hydrocortisone when patient's Na is wnl and BP remain stable  -Send TSH (pt has h/o subclinical hypothyroidism; may cause hypoNa)  -Hyponatremia MX per ICU team

## 2025-06-19 NOTE — DIETITIAN INITIAL EVALUATION ADULT - PERTINENT MEDS FT
MEDICATIONS  (STANDING):  chlorhexidine 2% Cloths 1 Application(s) Topical <User Schedule>  enoxaparin Injectable 40 milliGRAM(s) SubCutaneous every 24 hours  hydrocortisone sodium succinate Injectable 50 milliGRAM(s) IV Push every 6 hours  sodium chloride 3%. 500 milliLiter(s) (30 mL/Hr) IV Continuous <Continuous>    MEDICATIONS  (PRN):  ondansetron Injectable 4 milliGRAM(s) IV Push every 8 hours PRN Nausea and/or Vomiting

## 2025-06-19 NOTE — CONSULT NOTE ADULT - NS ATTEST RISK PROBLEM GEN_ALL_CORE FT
Patient is high risk with high level decision making due to severe hyponatremia requiring 3% NS and stress dose steroids. Currently in ICU

## 2025-06-19 NOTE — DIETITIAN INITIAL EVALUATION ADULT - ORAL INTAKE PTA/DIET HISTORY
Patient was seen in ICU in chair. He was able to provide some information. He asked about his diet and requested a sandwich-RD explained that he is on full liquids and cannot have sandwiches at this time. He had no food preferences and said that he has no food intolerances, eats 2 meals a day mostly. Denied weight loss. No restrictions at home. He does not take vitamin or mineral supplements.

## 2025-06-19 NOTE — DIETITIAN INITIAL EVALUATION ADULT - REASON FOR ADMISSION
severe hyponatremia, cortisol crisis Thalidomide Counseling: I discussed with the patient the risks of thalidomide including but not limited to birth defects, anxiety, weakness, chest pain, dizziness, cough and severe allergy.

## 2025-06-19 NOTE — PROGRESS NOTE ADULT - SUBJECTIVE AND OBJECTIVE BOX
INTERVAL HPI/OVERNIGHT EVENTS: ***    PRESSORS: [ ] YES [ ] NO  WHICH:    Antimicrobial:    Cardiovascular:    Pulmonary:    Hematalogic:  enoxaparin Injectable 40 milliGRAM(s) SubCutaneous every 24 hours    Other:  chlorhexidine 2% Cloths 1 Application(s) Topical <User Schedule>  hydrocortisone sodium succinate Injectable 50 milliGRAM(s) IV Push every 6 hours  ondansetron Injectable 4 milliGRAM(s) IV Push every 8 hours PRN  sodium chloride 3%. 500 milliLiter(s) IV Continuous <Continuous>    chlorhexidine 2% Cloths 1 Application(s) Topical <User Schedule>  enoxaparin Injectable 40 milliGRAM(s) SubCutaneous every 24 hours  hydrocortisone sodium succinate Injectable 50 milliGRAM(s) IV Push every 6 hours  ondansetron Injectable 4 milliGRAM(s) IV Push every 8 hours PRN  sodium chloride 3%. 500 milliLiter(s) IV Continuous <Continuous>    Drug Dosing Weight  Height (cm): 182.9 (18 Jun 2025 10:13)  Weight (kg): 90.5 (18 Jun 2025 15:00)  BMI (kg/m2): 27.1 (18 Jun 2025 15:00)  BSA (m2): 2.13 (18 Jun 2025 15:00)    CENTRAL LINE: [ ] YES [ ] NO  LOCATION:   DATE INSERTED:  REMOVE: [ ] YES [ ] NO  EXPLAIN:    MONTANEZ: [ ] YES [ ] NO    DATE INSERTED:  REMOVE:  [ ] YES [ ] NO  EXPLAIN:    A-LINE:  [ ] YES [ ] NO  LOCATION:   DATE INSERTED:  REMOVE:  [ ] YES [ ] NO  EXPLAIN:    PMH -reviewed admission note, no change since admission  PAST MEDICAL & SURGICAL HISTORY:  Depression      No significant past surgical history          ICU Vital Signs Last 24 Hrs  T(C): 36 (19 Jun 2025 04:55), Max: 36.6 (18 Jun 2025 16:00)  T(F): 96.8 (19 Jun 2025 04:55), Max: 97.8 (18 Jun 2025 16:00)  HR: 89 (19 Jun 2025 07:00) (73 - 112)  BP: 115/76 (19 Jun 2025 07:00) (115/71 - 148/82)  BP(mean): 88 (19 Jun 2025 07:00) (78 - 100)  ABP: --  ABP(mean): --  RR: 17 (19 Jun 2025 07:00) (11 - 24)  SpO2: 99% (19 Jun 2025 07:00) (93% - 100%)    O2 Parameters below as of 19 Jun 2025 05:00  Patient On (Oxygen Delivery Method): room air                  06-18 @ 07:01  -  06-19 @ 07:00  --------------------------------------------------------  IN: 590 mL / OUT: 1500 mL / NET: -910 mL            PHYSICAL EXAM:    GENERAL: NAD, well-groomed, well-developed  HEAD:  Atraumatic, Normocephalic  EYES: EOMI, PERRLA, conjunctiva and sclera clear  ENMT: No tonsillar erythema, exudates, or enlargement; Moist mucous membranes, Good dentition, No lesions  NECK: Supple, normal appearance, No JVD; Normal thyroid; Trachea midline  NERVOUS SYSTEM:  Alert & Oriented X3,  Motor Strength 5/5 B/L upper and lower extremities; DTRs 2+ intact and symmetric  CHEST/LUNG: No chest deformity; Normal percussion bilaterally; No rales, rhonchi, wheezing   HEART: Regular rate and rhythm; No murmurs, rubs, or gallops  ABDOMEN: Soft, Nontender, Nondistended; Bowel sounds present  EXTREMITIES:  2+ Peripheral Pulses, No clubbing, cyanosis, or edema  LYMPH: No lymphadenopathy noted  SKIN: No rashes or lesions;  Good capillary refill      LABS:  CBC Full  -  ( 19 Jun 2025 03:20 )  WBC Count : 7.73 K/uL  RBC Count : See note  Hemoglobin : 13.5 g/dL  Hematocrit : See note  Platelet Count - Automated : 226 K/uL  Mean Cell Volume : See note  Mean Cell Hemoglobin : See note  Mean Cell Hemoglobin Concentration : See note  Auto Neutrophil # : x  Auto Lymphocyte # : x  Auto Monocyte # : x  Auto Eosinophil # : x  Auto Basophil # : x  Auto Neutrophil % : x  Auto Lymphocyte % : x  Auto Monocyte % : x  Auto Eosinophil % : x  Auto Basophil % : x    06-19    112[LL]  |  84[L]  |  7   ----------------------------<  98  3.8   |  19[L]  |  0.70    Ca    8.6      19 Jun 2025 03:20  Phos  2.8     06-19  Mg     2.1     06-19    TPro  7.6  /  Alb  3.6  /  TBili  0.6  /  DBili  x   /  AST  48[H]  /  ALT  71[H]  /  AlkPhos  66  06-19      Urinalysis Basic - ( 19 Jun 2025 03:20 )    Color: x / Appearance: x / SG: x / pH: x  Gluc: 98 mg/dL / Ketone: x  / Bili: x / Urobili: x   Blood: x / Protein: x / Nitrite: x   Leuk Esterase: x / RBC: x / WBC x   Sq Epi: x / Non Sq Epi: x / Bacteria: x          RADIOLOGY & ADDITIONAL STUDIES REVIEWED:  ***    [ ]GOALS OF CARE DISCUSSION WITH PATIENT/FAMILY/PROXY:    CRITICAL CARE TIME SPENT: 35 minutes INTERVAL HPI/OVERNIGHT EVENTS: No acute events overnight.  Patient examined at bedside this AM.  Patient tolerated full liquid diet well, denies nausea, episodes of vomiting, epigastric pain.    PRESSORS: [ ] YES [X] NO  WHICH:    Antimicrobial:    Cardiovascular:    Pulmonary:    Hematalogic:  enoxaparin Injectable 40 milliGRAM(s) SubCutaneous every 24 hours    Other:  chlorhexidine 2% Cloths 1 Application(s) Topical <User Schedule>  hydrocortisone sodium succinate Injectable 50 milliGRAM(s) IV Push every 6 hours  ondansetron Injectable 4 milliGRAM(s) IV Push every 8 hours PRN  sodium chloride 3%. 500 milliLiter(s) IV Continuous <Continuous>    chlorhexidine 2% Cloths 1 Application(s) Topical <User Schedule>  enoxaparin Injectable 40 milliGRAM(s) SubCutaneous every 24 hours  hydrocortisone sodium succinate Injectable 50 milliGRAM(s) IV Push every 6 hours  ondansetron Injectable 4 milliGRAM(s) IV Push every 8 hours PRN  sodium chloride 3%. 500 milliLiter(s) IV Continuous <Continuous>    Drug Dosing Weight  Height (cm): 182.9 (18 Jun 2025 10:13)  Weight (kg): 90.5 (18 Jun 2025 15:00)  BMI (kg/m2): 27.1 (18 Jun 2025 15:00)  BSA (m2): 2.13 (18 Jun 2025 15:00)    CENTRAL LINE: [ ] YES [X] NO  LOCATION:   DATE INSERTED:  REMOVE: [ ] YES [ ] NO  EXPLAIN:    MONTANEZ: [ ] YES [X] NO    DATE INSERTED:  REMOVE:  [ ] YES [ ] NO  EXPLAIN:    A-LINE:  [ ] YES [X] NO  LOCATION:   DATE INSERTED:  REMOVE:  [ ] YES [ ] NO  EXPLAIN:    PMH -reviewed admission note, no change since admission  PAST MEDICAL & SURGICAL HISTORY:  Depression      No significant past surgical history          ICU Vital Signs Last 24 Hrs  T(C): 36 (19 Jun 2025 04:55), Max: 36.6 (18 Jun 2025 16:00)  T(F): 96.8 (19 Jun 2025 04:55), Max: 97.8 (18 Jun 2025 16:00)  HR: 89 (19 Jun 2025 07:00) (73 - 112)  BP: 115/76 (19 Jun 2025 07:00) (115/71 - 148/82)  BP(mean): 88 (19 Jun 2025 07:00) (78 - 100)  ABP: --  ABP(mean): --  RR: 17 (19 Jun 2025 07:00) (11 - 24)  SpO2: 99% (19 Jun 2025 07:00) (93% - 100%)    O2 Parameters below as of 19 Jun 2025 05:00  Patient On (Oxygen Delivery Method): room air                  06-18 @ 07:01  -  06-19 @ 07:00  --------------------------------------------------------  IN: 590 mL / OUT: 1500 mL / NET: -910 mL            PHYSICAL EXAM:    GENERAL: NAD, well-groomed, well-developed  HEAD:  Atraumatic, Normocephalic  EYES: EOMI, PERRLA, conjunctiva and sclera clear  ENMT: No tonsillar erythema, exudates, or enlargement; Moist mucous membranes, Good dentition, No lesions  NECK: Supple, normal appearance, No JVD; Normal thyroid; Trachea midline  NERVOUS SYSTEM:  Alert & Oriented X4,  Motor Strength 5/5 B/L upper and lower extremities; DTRs 2+ intact and symmetric  CHEST/LUNG: No chest deformity; No increased WOB; No rales, rhonchi, wheezing   HEART: Regular rate and rhythm; No murmurs, rubs, or gallops  ABDOMEN: Soft, Nontender, Nondistended; Bowel sounds present  EXTREMITIES:  2+ Peripheral Pulses, No clubbing, cyanosis, or edema  LYMPH: No lymphadenopathy noted  SKIN: No rashes or lesions;  Good capillary refill      LABS:  CBC Full  -  ( 19 Jun 2025 03:20 )  WBC Count : 7.73 K/uL  RBC Count : See note  Hemoglobin : 13.5 g/dL  Hematocrit : See note  Platelet Count - Automated : 226 K/uL  Mean Cell Volume : See note  Mean Cell Hemoglobin : See note  Mean Cell Hemoglobin Concentration : See note  Auto Neutrophil # : x  Auto Lymphocyte # : x  Auto Monocyte # : x  Auto Eosinophil # : x  Auto Basophil # : x  Auto Neutrophil % : x  Auto Lymphocyte % : x  Auto Monocyte % : x  Auto Eosinophil % : x  Auto Basophil % : x    06-19    112[LL]  |  84[L]  |  7   ----------------------------<  98  3.8   |  19[L]  |  0.70    Ca    8.6      19 Jun 2025 03:20  Phos  2.8     06-19  Mg     2.1     06-19    TPro  7.6  /  Alb  3.6  /  TBili  0.6  /  DBili  x   /  AST  48[H]  /  ALT  71[H]  /  AlkPhos  66  06-19      Urinalysis Basic - ( 19 Jun 2025 03:20 )    Color: x / Appearance: x / SG: x / pH: x  Gluc: 98 mg/dL / Ketone: x  / Bili: x / Urobili: x   Blood: x / Protein: x / Nitrite: x   Leuk Esterase: x / RBC: x / WBC x   Sq Epi: x / Non Sq Epi: x / Bacteria: x          RADIOLOGY & ADDITIONAL STUDIES REVIEWED:  ***    [ ]GOALS OF CARE DISCUSSION WITH PATIENT/FAMILY/PROXY:    CRITICAL CARE TIME SPENT: 35 minutes

## 2025-06-19 NOTE — DIETITIAN INITIAL EVALUATION ADULT - ETIOLOGY
Altered nutrition related labs evidenced by low Na, Cl and high Glu related to n/v, hypo-osmolarity.

## 2025-06-20 ENCOUNTER — NON-APPOINTMENT (OUTPATIENT)
Age: 65
End: 2025-06-20

## 2025-06-20 ENCOUNTER — APPOINTMENT (OUTPATIENT)
Dept: HEMATOLOGY ONCOLOGY | Facility: CLINIC | Age: 65
End: 2025-06-20

## 2025-06-20 LAB
ALBUMIN SERPL ELPH-MCNC: 3.4 G/DL — LOW (ref 3.5–5)
ALP SERPL-CCNC: 57 U/L — SIGNIFICANT CHANGE UP (ref 40–120)
ALT FLD-CCNC: 53 U/L DA — SIGNIFICANT CHANGE UP (ref 10–60)
ANION GAP SERPL CALC-SCNC: 6 MMOL/L — SIGNIFICANT CHANGE UP (ref 5–17)
ANION GAP SERPL CALC-SCNC: 7 MMOL/L — SIGNIFICANT CHANGE UP (ref 5–17)
AST SERPL-CCNC: 29 U/L — SIGNIFICANT CHANGE UP (ref 10–40)
BILIRUB SERPL-MCNC: 0.4 MG/DL — SIGNIFICANT CHANGE UP (ref 0.2–1.2)
BUN SERPL-MCNC: 12 MG/DL — SIGNIFICANT CHANGE UP (ref 7–18)
BUN SERPL-MCNC: 12 MG/DL — SIGNIFICANT CHANGE UP (ref 7–18)
BUN SERPL-MCNC: 15 MG/DL — SIGNIFICANT CHANGE UP (ref 7–18)
BUN SERPL-MCNC: 18 MG/DL — SIGNIFICANT CHANGE UP (ref 7–18)
CALCIUM SERPL-MCNC: 8.7 MG/DL — SIGNIFICANT CHANGE UP (ref 8.4–10.5)
CALCIUM SERPL-MCNC: 8.9 MG/DL — SIGNIFICANT CHANGE UP (ref 8.4–10.5)
CHLORIDE SERPL-SCNC: 100 MMOL/L — SIGNIFICANT CHANGE UP (ref 96–108)
CHLORIDE SERPL-SCNC: 101 MMOL/L — SIGNIFICANT CHANGE UP (ref 96–108)
CHLORIDE SERPL-SCNC: 98 MMOL/L — SIGNIFICANT CHANGE UP (ref 96–108)
CHLORIDE SERPL-SCNC: 99 MMOL/L — SIGNIFICANT CHANGE UP (ref 96–108)
CO2 SERPL-SCNC: 20 MMOL/L — LOW (ref 22–31)
CO2 SERPL-SCNC: 21 MMOL/L — LOW (ref 22–31)
CREAT SERPL-MCNC: 0.87 MG/DL — SIGNIFICANT CHANGE UP (ref 0.5–1.3)
CREAT SERPL-MCNC: 1 MG/DL — SIGNIFICANT CHANGE UP (ref 0.5–1.3)
CREAT SERPL-MCNC: 1.02 MG/DL — SIGNIFICANT CHANGE UP (ref 0.5–1.3)
CREAT SERPL-MCNC: 1.1 MG/DL — SIGNIFICANT CHANGE UP (ref 0.5–1.3)
EGFR: 74 ML/MIN/1.73M2 — SIGNIFICANT CHANGE UP
EGFR: 74 ML/MIN/1.73M2 — SIGNIFICANT CHANGE UP
EGFR: 82 ML/MIN/1.73M2 — SIGNIFICANT CHANGE UP
EGFR: 82 ML/MIN/1.73M2 — SIGNIFICANT CHANGE UP
EGFR: 84 ML/MIN/1.73M2 — SIGNIFICANT CHANGE UP
EGFR: 84 ML/MIN/1.73M2 — SIGNIFICANT CHANGE UP
EGFR: 96 ML/MIN/1.73M2 — SIGNIFICANT CHANGE UP
EGFR: 96 ML/MIN/1.73M2 — SIGNIFICANT CHANGE UP
GLUCOSE SERPL-MCNC: 110 MG/DL — HIGH (ref 70–99)
GLUCOSE SERPL-MCNC: 111 MG/DL — HIGH (ref 70–99)
GLUCOSE SERPL-MCNC: 130 MG/DL — HIGH (ref 70–99)
GLUCOSE SERPL-MCNC: 149 MG/DL — HIGH (ref 70–99)
HCT VFR BLD CALC: SIGNIFICANT CHANGE UP (ref 39–50)
HGB BLD-MCNC: 13.8 G/DL — SIGNIFICANT CHANGE UP (ref 13–17)
MAGNESIUM SERPL-MCNC: 2.3 MG/DL — SIGNIFICANT CHANGE UP (ref 1.6–2.6)
MCHC RBC-ENTMCNC: SIGNIFICANT CHANGE UP (ref 27–34)
MCHC RBC-ENTMCNC: SIGNIFICANT CHANGE UP (ref 32–36)
MCV RBC AUTO: SIGNIFICANT CHANGE UP (ref 80–100)
NRBC BLD AUTO-RTO: 0 /100 WBCS — SIGNIFICANT CHANGE UP (ref 0–0)
PHOSPHATE SERPL-MCNC: 1.8 MG/DL — LOW (ref 2.5–4.5)
PLATELET # BLD AUTO: 308 K/UL — SIGNIFICANT CHANGE UP (ref 150–400)
POTASSIUM SERPL-MCNC: 3.6 MMOL/L — SIGNIFICANT CHANGE UP (ref 3.5–5.3)
POTASSIUM SERPL-MCNC: 3.8 MMOL/L — SIGNIFICANT CHANGE UP (ref 3.5–5.3)
POTASSIUM SERPL-MCNC: 4.1 MMOL/L — SIGNIFICANT CHANGE UP (ref 3.5–5.3)
POTASSIUM SERPL-MCNC: 4.4 MMOL/L — SIGNIFICANT CHANGE UP (ref 3.5–5.3)
POTASSIUM SERPL-SCNC: 3.6 MMOL/L — SIGNIFICANT CHANGE UP (ref 3.5–5.3)
POTASSIUM SERPL-SCNC: 3.8 MMOL/L — SIGNIFICANT CHANGE UP (ref 3.5–5.3)
POTASSIUM SERPL-SCNC: 4.1 MMOL/L — SIGNIFICANT CHANGE UP (ref 3.5–5.3)
POTASSIUM SERPL-SCNC: 4.4 MMOL/L — SIGNIFICANT CHANGE UP (ref 3.5–5.3)
PROT SERPL-MCNC: 6.7 G/DL — SIGNIFICANT CHANGE UP (ref 6–8.3)
RBC # BLD: SIGNIFICANT CHANGE UP (ref 4.2–5.8)
RBC # FLD: SIGNIFICANT CHANGE UP (ref 10.3–14.5)
SODIUM SERPL-SCNC: 125 MMOL/L — LOW (ref 135–145)
SODIUM SERPL-SCNC: 126 MMOL/L — LOW (ref 135–145)
SODIUM SERPL-SCNC: 127 MMOL/L — LOW (ref 135–145)
SODIUM SERPL-SCNC: 128 MMOL/L — LOW (ref 135–145)
T4 FREE SERPL-MCNC: 1.6 NG/DL — SIGNIFICANT CHANGE UP (ref 0.9–1.8)
TSH SERPL-MCNC: 1.88 UU/ML — SIGNIFICANT CHANGE UP (ref 0.34–4.82)
WBC # BLD: 7.1 K/UL — SIGNIFICANT CHANGE UP (ref 3.8–10.5)
WBC # FLD AUTO: 7.1 K/UL — SIGNIFICANT CHANGE UP (ref 3.8–10.5)

## 2025-06-20 RX ORDER — HYDROCORTISONE 20 MG
50 TABLET ORAL EVERY 8 HOURS
Refills: 0 | Status: DISCONTINUED | OUTPATIENT
Start: 2025-06-20 | End: 2025-06-22

## 2025-06-20 RX ORDER — SODIUM CHLORIDE 9 G/1000ML
1000 INJECTION, SOLUTION INTRAVENOUS
Refills: 0 | Status: DISCONTINUED | OUTPATIENT
Start: 2025-06-20 | End: 2025-06-20

## 2025-06-20 RX ORDER — SENNA 187 MG
2 TABLET ORAL AT BEDTIME
Refills: 0 | Status: DISCONTINUED | OUTPATIENT
Start: 2025-06-20 | End: 2025-06-27

## 2025-06-20 RX ORDER — POLYETHYLENE GLYCOL 3350 17 G/17G
17 POWDER, FOR SOLUTION ORAL ONCE
Refills: 0 | Status: COMPLETED | OUTPATIENT
Start: 2025-06-20 | End: 2025-06-20

## 2025-06-20 RX ORDER — POTASSIUM PHOSPHATE, MONOBASIC POTASSIUM PHOSPHATE, DIBASIC INJECTION, 236; 224 MG/ML; MG/ML
30 SOLUTION, CONCENTRATE INTRAVENOUS ONCE
Refills: 0 | Status: COMPLETED | OUTPATIENT
Start: 2025-06-20 | End: 2025-06-20

## 2025-06-20 RX ORDER — POLYETHYLENE GLYCOL 3350 17 G/17G
17 POWDER, FOR SOLUTION ORAL AT BEDTIME
Refills: 0 | Status: DISCONTINUED | OUTPATIENT
Start: 2025-06-20 | End: 2025-06-27

## 2025-06-20 RX ADMIN — Medication 50 MILLIGRAM(S): at 05:45

## 2025-06-20 RX ADMIN — POTASSIUM PHOSPHATE, MONOBASIC POTASSIUM PHOSPHATE, DIBASIC INJECTION, 83.33 MILLIMOLE(S): 236; 224 SOLUTION, CONCENTRATE INTRAVENOUS at 06:13

## 2025-06-20 RX ADMIN — SODIUM CHLORIDE 100 MILLILITER(S): 9 INJECTION, SOLUTION INTRAVENOUS at 06:13

## 2025-06-20 RX ADMIN — ENOXAPARIN SODIUM 40 MILLIGRAM(S): 100 INJECTION SUBCUTANEOUS at 05:46

## 2025-06-20 RX ADMIN — Medication 50 MILLIGRAM(S): at 00:32

## 2025-06-20 RX ADMIN — Medication 50 MILLIGRAM(S): at 22:29

## 2025-06-20 RX ADMIN — Medication 50 MILLIGRAM(S): at 12:15

## 2025-06-20 RX ADMIN — Medication 2 TABLET(S): at 22:30

## 2025-06-20 RX ADMIN — Medication 1 APPLICATION(S): at 05:50

## 2025-06-20 NOTE — PROGRESS NOTE ADULT - ASSESSMENT
65M PMH HTN, HLD, depression, and melanoma presenting with nausea and vomiting x 4 days. Admitted to ICU for severe hyponatremia.    #hyponatremia 2/2 SIADH?  #adrenal insufficiency  #HTN  #HLD  #melanoma    =================== Neuro============================  AAOX4  no active issues      ================= Cardiovascular==========================  #HTN  takes losartan 100mg qd and amlodipine 10mg qd at home  will hold home meds at this time   /80  monitor BP, may resume HTN meds if BP is elevated      ================- Pulm=================================  no active issues    ==================ID===================================  no active issues    ================= Nephro================================  #severe hyponatremia  presenting with generalized weakness, nausea, vomiting (3-5 episodes of NBNB emesis daily) and lethargy x 4 days  Na 108  hyponatremia likely SIADH 2/2 possible cortisol crisis  takes hydrocortisone 10mg BID as per patient  will start solucortef 50q6 for now   will fluid restrict for now  f/u free cortisol level    - Na 115 (Goal 116)  - c/w 3%NS at 30cc/hr for 3 hours  - trend Na q4  - Endo consulted: Dr Syed      =================GI====================================  #nausea and vomiting  2/2 severe hyponatremia  will give zofran PRN and correct underlying cause    - tolerating full liquid diet, advanced to pureed and moderately thickened liquids      ================ Heme==================================  no active issues    =================Endocrine===============================  #adrenal insufficiency  takes hydrocortisone 10mg BID at home  will start solucortef 50q6     - f/u free cortisol level    ================= Skin/Catheters============================  Peripheral IV lines   Olivares catheter   Patient consented for A line and CVC     =================Prophylaxis =============================  DVT prophylaxis   GI prophylaxis     ==================GOC==================================  FULL CODE   Disposition      65M PMH HTN, HLD, depression, and melanoma presenting with nausea and vomiting x 4 days. Admitted to ICU for severe hyponatremia.    #hyponatremia 2/2 SIADH?  #adrenal insufficiency  #HTN  #HLD  #melanoma    =================== Neuro============================  AAOX4  no active issues      ================= Cardiovascular==========================  #HTN  takes losartan 100mg qd and amlodipine 10mg qd at home  will hold home meds at this time   /80  monitor BP, may resume HTN meds if BP is elevated      ================- Pulm=================================  no active issues    ==================ID===================================  no active issues    ================= Nephro================================  #severe hyponatremia  presenting with generalized weakness, nausea, vomiting (3-5 episodes of NBNB emesis daily) and lethargy x 4 days  Na 108  hyponatremia likely SIADH 2/2 possible cortisol crisis  takes hydrocortisone 10mg BID as per patient  was initially on fluid restriction, transitioned to 3% NS, corrected to 117 and was placed on fluid restriction again.  Repeat sodium was 128 and d5% was started for 2 hours and fluid restriction was continued    - f/u free cortisol level, uric acid  - Na 126   - trend Na q6  - Endo consulted: Dr Kunal Olguin consulted: Dr Suresh    =================GI====================================  #nausea and vomiting  2/2 severe hyponatremia  will give zofran PRN and correct underlying cause    - tolerating regular dash diet      ================ Heme==================================  no active issues    =================Endocrine===============================  #adrenal insufficiency  takes hydrocortisone 10mg BID at home  c/w solucortef 50q6     - f/u free cortisol level    ================= Skin/Catheters============================  Peripheral IV lines   Olivares catheter   Patient consented for A line and CVC     =================Prophylaxis =============================  DVT prophylaxis   GI prophylaxis     ==================GOC==================================  FULL CODE   Disposition      65M PMH HTN, HLD, depression, and melanoma presenting with nausea and vomiting x 4 days. Admitted to ICU for severe hyponatremia.    #hyponatremia 2/2 SIADH?  #adrenal insufficiency  #HTN  #HLD  #melanoma    =================== Neuro============================  AAOX4  no active issues      ================= Cardiovascular==========================  #HTN  takes losartan 100mg qd and amlodipine 10mg qd at home  will hold home meds at this time   /80  monitor BP, may resume HTN meds if BP is elevated      ================- Pulm=================================  no active issues    ==================ID===================================  no active issues    ================= Nephro================================  #severe hyponatremia  presenting with generalized weakness, nausea, vomiting (3-5 episodes of NBNB emesis daily) and lethargy x 4 days  Na 108  hyponatremia likely SIADH 2/2 possible cortisol crisis  takes hydrocortisone 10mg BID as per patient  c/w hydrocort taper as per endo recs  was initially on fluid restriction, transitioned to 3% NS, corrected to 117 and was placed on fluid restriction again.  Repeat sodium was 128 and d5% was started for 2 hours and fluid restriction was continued    - f/u free cortisol level, uric acid  - Na 126   - trend Na q6  - Endo consulted: Dr Syed  - Nephabdi consulted: Dr Suresh    =================GI====================================  #nausea and vomiting  2/2 severe hyponatremia  will give zofran PRN and correct underlying cause    - tolerating regular dash diet      ================ Heme==================================  no active issues    =================Endocrine===============================  #adrenal insufficiency  takes hydrocortisone 10mg BID at home  Endo recs for taper- started on hydrocort 50 q8 (day1/2)    - f/u free cortisol level  - Dr Kunal consulted    ================= Skin/Catheters============================  Peripheral IV lines   Olivares catheter   Patient consented for A line and CVC     =================Prophylaxis =============================  DVT prophylaxis   GI prophylaxis     ==================GOC==================================  FULL CODE   Disposition

## 2025-06-20 NOTE — CONSULT NOTE ADULT - SUBJECTIVE AND OBJECTIVE BOX
NEPHROLOGY MEDICAL CARE, [facility] - Dr. Dequan Suresh/ Dr. Mary Lance/ Dr. Jean Paul Smalls/ Dr. Shawn Alvarez    Date of Service: 06-20-25    Patient was seen and examined at bedside.     Consultation requested by:  Dr. Selby    Reason for Consult: Hyponatremia.    HPI:  65M PMH HTN, HLD, depression, and melanoma presenting with nausea and vomiting x 4 days. He states that he had URI symptoms one week ago and since then has had decreased appetite. He reported that he had nausea and vomiting since Saturday, where he had 3-5 episodes of NBNB vomiting. He has had poor po intake since Saturday due to being nervous about feeling nauseous after, but has been drinking two to three 8 oz cups of water each day. He denies any dizziness, lightheadedness, blurry vision, or abdominal pain. He does report that he noticed his speech was slower than normal and that he is more lethargic. He denies missing any doses of his medications, including his hydrocortisone 10mg BID. He denies fevers, chills, CP, SOB, diarrhea, dysuria, numbness/tingling/weakness in extremities. He lives at home alone and ambulates independently.     Renal HPI: patient is not aware of any low sodium in the past. Pt was admitted with Na around 108 and received 3% saline on admission and Na slowly improved to   110s then remained stable from 125 to 128. Patient has h/o adrenal crisis which he takes hydrocortisone 10mg bid. Patient has been suffering from respiratory virus since the   past week. denies chest pain, diarrhea and fever. patient is not taking any thiazide diuretics.       PMH:   Depression        PSH:   No significant past surgical history        FAMILY HISTORY:  No pertinent family history in first degree relatives        Social History:  non-smoker/ non-alcoholic     Home Meds:  Home Medications:  hydrocortisone 10 mg oral tablet: 1 tab(s) orally 2 times a day (18 Jun 2025 16:24)  losartan 100 mg oral tablet: 1 tab(s) orally once a day (12 Sep 2019 12:09)  Norvasc 10 mg oral tablet: 1 tab(s) orally once a day (18 Jun 2025 16:19)      Allergies:  Allergies    No Known Allergies    Intolerances        REVIEW OF SYSTEMS:  CONSTITUTIONAL: No fever; No weight loss; fatigue  EYES: No eye pain; No visual disturbances; No discharge  ENMT:  No difficulty hearing; No tinnitus;  No vertigo; No sinus; No throat pain  NECK: No pain; No stiffness  BREASTS: No pain; No masses; No nipple discharge  RESPIRATORY: cough; No wheezing; No chills; No hemoptysis; No shortness of breath  CARDIOVASCULAR: No chest pain; No palpitations; No dizziness; No leg swelling  GASTROINTESTINAL: No abdominal pain; No epigastric pain; No nausea; No vomiting; No hematemesis; No diarrhea; No constipation. No melena   GENITOURINARY: No dysuria No frequency; No hematuria; No incontinence  NEUROLOGICAL: No headaches; No memory loss; No loss of strength; No numbness; No tremors  SKIN: No itching; No burning; No rashes  ENDOCRINE: No heat or cold intolerance; No hair loss  MUSCULOSKELETAL: No joint pain or swelling; No muscle, back, or extremity pain  PSYCHIATRIC: No depression; No anxiety; No mood swings; No difficulty sleeping  HEME/LYMPH: No easy bruising; No bleeding gums  ALLERY AND IMMUNOLOGIC: No hives or eczema    Vital Signs Last 24 Hrs  T(C): 36.7 (20 Jun 2025 07:00), Max: 36.7 (20 Jun 2025 07:00)  T(F): 98 (20 Jun 2025 07:00), Max: 98 (20 Jun 2025 07:00)  HR: 77 (20 Jun 2025 11:00) (73 - 106)  BP: 77/67 (20 Jun 2025 11:00) (77/67 - 128/95)  BP(mean): 72 (20 Jun 2025 11:00) (72 - 103)  RR: 16 (20 Jun 2025 11:00) (12 - 22)  SpO2: 96% (20 Jun 2025 11:00) (94% - 99%)    Parameters below as of 20 Jun 2025 08:00  Patient On (Oxygen Delivery Method): room air        06-19 @ 07:01  -  06-20 @ 07:00  --------------------------------------------------------  IN: 960 mL / OUT: 1950 mL / NET: -990 mL    06-20 @ 07:01  -  06-20 @ 12:46  --------------------------------------------------------  IN: 300 mL / OUT: 0 mL / NET: 300 mL          PHYSICAL EXAM:  General: No acute respiratory distress.  Eyes: conjunctiva and sclera clear  ENMT: Atraumatic, Normocephalic, supple, No JVD present. Moist mucous membranes  Respiratory: Bilateral clear lungs; No rales, rhonchi, wheezing  Cardiovascular: S1S2+; no m/r/g  Gastrointestinal: Soft, Non-tender, Nondistended; Bowel sounds present, no hepatosplenomegaly.   Neuro:  Awake, Alert & Oriented X3, No focal deficits present.   Ext:  2+ Peripheral Pulses and No edema, No Cyanosis  Skin: No visible rashes        LABS:                        13.8   7.10  )-----------( 308      ( 20 Jun 2025 03:53 )             See note    06-20    125[L]  |  98  |  12  ----------------------------<  149[H]  3.6   |  21[L]  |  1.00    Ca    8.9      20 Jun 2025 08:30  Phos  1.8     06-20  Mg     2.3     06-20    TPro  6.7  /  Alb  3.4[L]  /  TBili  0.4  /  DBili  x   /  AST  29  /  ALT  53  /  AlkPhos  57  06-20      Urinalysis Basic - ( 20 Jun 2025 08:30 )    Color: x / Appearance: x / SG: x / pH: x  Gluc: 149 mg/dL / Ketone: x  / Bili: x / Urobili: x   Blood: x / Protein: x / Nitrite: x   Leuk Esterase: x / RBC: x / WBC x   Sq Epi: x / Non Sq Epi: x / Bacteria: x      Magnesium: 2.3 mg/dL (06-20 @ 03:53)  Phosphorus: 1.8 mg/dL *L* (06-20 @ 03:53)    Urine studies  Sodium, Random Urine: 57 mmol/L (06-18 @ 13:45)  Creatinine, Random Urine: 48 mg/dL (06-18 @ 13:45)  Osmolality, Random Urine: 319 mos/kg (06-18 @ 13:45)      Medications:  MEDICATIONS  (STANDING):  chlorhexidine 2% Cloths 1 Application(s) Topical <User Schedule>  enoxaparin Injectable 40 milliGRAM(s) SubCutaneous every 24 hours  hydrocortisone sodium succinate Injectable 50 milliGRAM(s) IV Push every 8 hours  polyethylene glycol 3350 17 Gram(s) Oral at bedtime  senna 2 Tablet(s) Oral at bedtime    MEDICATIONS  (PRN):  ondansetron    Tablet 4 milliGRAM(s) Oral daily PRN Nausea and/or Vomiting

## 2025-06-20 NOTE — PROGRESS NOTE ADULT - SUBJECTIVE AND OBJECTIVE BOX
INTERVAL HPI/OVERNIGHT EVENTS: ***INCOMPLETE***    PRESSORS: [ ] YES [ ] NO  WHICH:    Antimicrobial:    Cardiovascular:    Pulmonary:    Hematalogic:  enoxaparin Injectable 40 milliGRAM(s) SubCutaneous every 24 hours    Other:  chlorhexidine 2% Cloths 1 Application(s) Topical <User Schedule>  dextrose 5%. 1000 milliLiter(s) IV Continuous <Continuous>  hydrocortisone sodium succinate Injectable 50 milliGRAM(s) IV Push every 6 hours  ondansetron    Tablet 4 milliGRAM(s) Oral daily PRN  sodium chloride 3%. 500 milliLiter(s) IV Continuous <Continuous>    chlorhexidine 2% Cloths 1 Application(s) Topical <User Schedule>  dextrose 5%. 1000 milliLiter(s) IV Continuous <Continuous>  enoxaparin Injectable 40 milliGRAM(s) SubCutaneous every 24 hours  hydrocortisone sodium succinate Injectable 50 milliGRAM(s) IV Push every 6 hours  ondansetron    Tablet 4 milliGRAM(s) Oral daily PRN  sodium chloride 3%. 500 milliLiter(s) IV Continuous <Continuous>    Drug Dosing Weight  Height (cm): 182.9 (18 Jun 2025 10:13)  Weight (kg): 90.5 (18 Jun 2025 15:00)  BMI (kg/m2): 27.1 (18 Jun 2025 15:00)  BSA (m2): 2.13 (18 Jun 2025 15:00)    CENTRAL LINE: [ ] YES [ ] NO  LOCATION:   DATE INSERTED:  REMOVE: [ ] YES [ ] NO  EXPLAIN:    MONTANEZ: [ ] YES [ ] NO    DATE INSERTED:  REMOVE:  [ ] YES [ ] NO  EXPLAIN:    A-LINE:  [ ] YES [ ] NO  LOCATION:   DATE INSERTED:  REMOVE:  [ ] YES [ ] NO  EXPLAIN:    PMH -reviewed admission note, no change since admission  PAST MEDICAL & SURGICAL HISTORY:  Depression      No significant past surgical history          ICU Vital Signs Last 24 Hrs  T(C): 36.3 (20 Jun 2025 04:00), Max: 36.5 (19 Jun 2025 15:00)  T(F): 97.4 (20 Jun 2025 04:00), Max: 97.7 (19 Jun 2025 15:00)  HR: 85 (20 Jun 2025 05:00) (73 - 99)  BP: 117/65 (20 Jun 2025 05:00) (86/54 - 122/73)  BP(mean): 80 (20 Jun 2025 05:00) (65 - 92)  ABP: --  ABP(mean): --  RR: 17 (20 Jun 2025 05:00) (12 - 20)  SpO2: 98% (20 Jun 2025 05:00) (94% - 100%)              06-19 @ 07:01  -  06-20 @ 07:00  --------------------------------------------------------  IN: 860 mL / OUT: 1950 mL / NET: -1090 mL            PHYSICAL EXAM:    GENERAL: NAD, well-groomed, well-developed  HEAD:  Atraumatic, Normocephalic  EYES: EOMI, PERRLA, conjunctiva and sclera clear  ENMT: No tonsillar erythema, exudates, or enlargement; Moist mucous membranes, Good dentition, No lesions  NECK: Supple, normal appearance, No JVD; Normal thyroid; Trachea midline  NERVOUS SYSTEM:  Alert & Oriented X3,  Motor Strength 5/5 B/L upper and lower extremities; DTRs 2+ intact and symmetric  CHEST/LUNG: No chest deformity; Normal percussion bilaterally; No rales, rhonchi, wheezing   HEART: Regular rate and rhythm; No murmurs, rubs, or gallops  ABDOMEN: Soft, Nontender, Nondistended; Bowel sounds present  EXTREMITIES:  2+ Peripheral Pulses, No clubbing, cyanosis, or edema  LYMPH: No lymphadenopathy noted  SKIN: No rashes or lesions;  Good capillary refill      LABS:  CBC Full  -  ( 20 Jun 2025 03:53 )  WBC Count : 7.10 K/uL  RBC Count : See note  Hemoglobin : 13.8 g/dL  Hematocrit : See note  Platelet Count - Automated : 308 K/uL  Mean Cell Volume : See note  Mean Cell Hemoglobin : See note  Mean Cell Hemoglobin Concentration : See note  Auto Neutrophil # : x  Auto Lymphocyte # : x  Auto Monocyte # : x  Auto Eosinophil # : x  Auto Basophil # : x  Auto Neutrophil % : x  Auto Lymphocyte % : x  Auto Monocyte % : x  Auto Eosinophil % : x  Auto Basophil % : x    06-20    128[L]  |  101  |  12  ----------------------------<  111[H]  3.8   |  20[L]  |  0.87    Ca    8.7      20 Jun 2025 03:53  Phos  1.8     06-20  Mg     2.3     06-20    TPro  6.7  /  Alb  3.4[L]  /  TBili  0.4  /  DBili  x   /  AST  29  /  ALT  53  /  AlkPhos  57  06-20      Urinalysis Basic - ( 20 Jun 2025 03:53 )    Color: x / Appearance: x / SG: x / pH: x  Gluc: 111 mg/dL / Ketone: x  / Bili: x / Urobili: x   Blood: x / Protein: x / Nitrite: x   Leuk Esterase: x / RBC: x / WBC x   Sq Epi: x / Non Sq Epi: x / Bacteria: x          RADIOLOGY & ADDITIONAL STUDIES REVIEWED:  ***    [ ]GOALS OF CARE DISCUSSION WITH PATIENT/FAMILY/PROXY:    CRITICAL CARE TIME SPENT: 35 minutes INTERVAL HPI/OVERNIGHT EVENTS: No acute events overnight.  Patient examined at bedside this AM.  Patient out of bed to chair today, feels unsteady while walking. Says last bowel movement was days ago, feels constipated. Denies headache, nausea, and vomiting.    PRESSORS: [ ] YES [ ] NO  WHICH:    Antimicrobial:    Cardiovascular:    Pulmonary:    Hematalogic:  enoxaparin Injectable 40 milliGRAM(s) SubCutaneous every 24 hours    Other:  chlorhexidine 2% Cloths 1 Application(s) Topical <User Schedule>  dextrose 5%. 1000 milliLiter(s) IV Continuous <Continuous>  hydrocortisone sodium succinate Injectable 50 milliGRAM(s) IV Push every 6 hours  ondansetron    Tablet 4 milliGRAM(s) Oral daily PRN  sodium chloride 3%. 500 milliLiter(s) IV Continuous <Continuous>    chlorhexidine 2% Cloths 1 Application(s) Topical <User Schedule>  dextrose 5%. 1000 milliLiter(s) IV Continuous <Continuous>  enoxaparin Injectable 40 milliGRAM(s) SubCutaneous every 24 hours  hydrocortisone sodium succinate Injectable 50 milliGRAM(s) IV Push every 6 hours  ondansetron    Tablet 4 milliGRAM(s) Oral daily PRN  sodium chloride 3%. 500 milliLiter(s) IV Continuous <Continuous>    Drug Dosing Weight  Height (cm): 182.9 (18 Jun 2025 10:13)  Weight (kg): 90.5 (18 Jun 2025 15:00)  BMI (kg/m2): 27.1 (18 Jun 2025 15:00)  BSA (m2): 2.13 (18 Jun 2025 15:00)    CENTRAL LINE: [ ] YES [ ] NO  LOCATION:   DATE INSERTED:  REMOVE: [ ] YES [ ] NO  EXPLAIN:    MONTANEZ: [ ] YES [ ] NO    DATE INSERTED:  REMOVE:  [ ] YES [ ] NO  EXPLAIN:    A-LINE:  [ ] YES [ ] NO  LOCATION:   DATE INSERTED:  REMOVE:  [ ] YES [ ] NO  EXPLAIN:    PMH -reviewed admission note, no change since admission  PAST MEDICAL & SURGICAL HISTORY:  Depression      No significant past surgical history          ICU Vital Signs Last 24 Hrs  T(C): 36.3 (20 Jun 2025 04:00), Max: 36.5 (19 Jun 2025 15:00)  T(F): 97.4 (20 Jun 2025 04:00), Max: 97.7 (19 Jun 2025 15:00)  HR: 85 (20 Jun 2025 05:00) (73 - 99)  BP: 117/65 (20 Jun 2025 05:00) (86/54 - 122/73)  BP(mean): 80 (20 Jun 2025 05:00) (65 - 92)  ABP: --  ABP(mean): --  RR: 17 (20 Jun 2025 05:00) (12 - 20)  SpO2: 98% (20 Jun 2025 05:00) (94% - 100%)              06-19 @ 07:01  -  06-20 @ 07:00  --------------------------------------------------------  IN: 860 mL / OUT: 1950 mL / NET: -1090 mL            PHYSICAL EXAM:    GENERAL: NAD, well-groomed, well-developed  HEAD:  Atraumatic, Normocephalic  EYES: EOMI, PERRLA, conjunctiva and sclera clear  ENMT: No tonsillar erythema, exudates, or enlargement; Moist mucous membranes, Good dentition, No lesions  NECK: Supple, normal appearance, No JVD; Normal thyroid; Trachea midline  NERVOUS SYSTEM:  Alert & Oriented X3,  Motor Strength 5/5 B/L upper and lower extremities; DTRs 2+ intact and symmetric  CHEST/LUNG: No chest deformity; Normal percussion bilaterally; No rales, rhonchi, wheezing   HEART: Regular rate and rhythm; No murmurs, rubs, or gallops  ABDOMEN: Soft, Nontender, Nondistended; Bowel sounds present  EXTREMITIES:  2+ Peripheral Pulses, No clubbing, cyanosis, or edema  LYMPH: No lymphadenopathy noted  SKIN: No rashes or lesions;  Good capillary refill      LABS:  CBC Full  -  ( 20 Jun 2025 03:53 )  WBC Count : 7.10 K/uL  RBC Count : See note  Hemoglobin : 13.8 g/dL  Hematocrit : See note  Platelet Count - Automated : 308 K/uL  Mean Cell Volume : See note  Mean Cell Hemoglobin : See note  Mean Cell Hemoglobin Concentration : See note  Auto Neutrophil # : x  Auto Lymphocyte # : x  Auto Monocyte # : x  Auto Eosinophil # : x  Auto Basophil # : x  Auto Neutrophil % : x  Auto Lymphocyte % : x  Auto Monocyte % : x  Auto Eosinophil % : x  Auto Basophil % : x    06-20    128[L]  |  101  |  12  ----------------------------<  111[H]  3.8   |  20[L]  |  0.87    Ca    8.7      20 Jun 2025 03:53  Phos  1.8     06-20  Mg     2.3     06-20    TPro  6.7  /  Alb  3.4[L]  /  TBili  0.4  /  DBili  x   /  AST  29  /  ALT  53  /  AlkPhos  57  06-20      Urinalysis Basic - ( 20 Jun 2025 03:53 )    Color: x / Appearance: x / SG: x / pH: x  Gluc: 111 mg/dL / Ketone: x  / Bili: x / Urobili: x   Blood: x / Protein: x / Nitrite: x   Leuk Esterase: x / RBC: x / WBC x   Sq Epi: x / Non Sq Epi: x / Bacteria: x          RADIOLOGY & ADDITIONAL STUDIES REVIEWED:  ***    [ ]GOALS OF CARE DISCUSSION WITH PATIENT/FAMILY/PROXY:    CRITICAL CARE TIME SPENT: 35 minutes

## 2025-06-21 LAB
ALBUMIN SERPL ELPH-MCNC: 3.2 G/DL — LOW (ref 3.5–5)
ALP SERPL-CCNC: 55 U/L — SIGNIFICANT CHANGE UP (ref 40–120)
ALT FLD-CCNC: 47 U/L DA — SIGNIFICANT CHANGE UP (ref 10–60)
ANION GAP SERPL CALC-SCNC: 5 MMOL/L — SIGNIFICANT CHANGE UP (ref 5–17)
ANION GAP SERPL CALC-SCNC: 5 MMOL/L — SIGNIFICANT CHANGE UP (ref 5–17)
AST SERPL-CCNC: 22 U/L — SIGNIFICANT CHANGE UP (ref 10–40)
BILIRUB SERPL-MCNC: 0.3 MG/DL — SIGNIFICANT CHANGE UP (ref 0.2–1.2)
BUN SERPL-MCNC: 17 MG/DL — SIGNIFICANT CHANGE UP (ref 7–18)
BUN SERPL-MCNC: 17 MG/DL — SIGNIFICANT CHANGE UP (ref 7–18)
CALCIUM SERPL-MCNC: 8.4 MG/DL — SIGNIFICANT CHANGE UP (ref 8.4–10.5)
CALCIUM SERPL-MCNC: 8.8 MG/DL — SIGNIFICANT CHANGE UP (ref 8.4–10.5)
CHLORIDE SERPL-SCNC: 100 MMOL/L — SIGNIFICANT CHANGE UP (ref 96–108)
CHLORIDE SERPL-SCNC: 98 MMOL/L — SIGNIFICANT CHANGE UP (ref 96–108)
CO2 SERPL-SCNC: 21 MMOL/L — LOW (ref 22–31)
CO2 SERPL-SCNC: 23 MMOL/L — SIGNIFICANT CHANGE UP (ref 22–31)
CREAT SERPL-MCNC: 0.9 MG/DL — SIGNIFICANT CHANGE UP (ref 0.5–1.3)
CREAT SERPL-MCNC: 1.27 MG/DL — SIGNIFICANT CHANGE UP (ref 0.5–1.3)
EGFR: 63 ML/MIN/1.73M2 — SIGNIFICANT CHANGE UP
EGFR: 63 ML/MIN/1.73M2 — SIGNIFICANT CHANGE UP
EGFR: 95 ML/MIN/1.73M2 — SIGNIFICANT CHANGE UP
EGFR: 95 ML/MIN/1.73M2 — SIGNIFICANT CHANGE UP
GLUCOSE SERPL-MCNC: 133 MG/DL — HIGH (ref 70–99)
GLUCOSE SERPL-MCNC: 96 MG/DL — SIGNIFICANT CHANGE UP (ref 70–99)
HCT VFR BLD CALC: 35 % — LOW (ref 39–50)
HGB BLD-MCNC: 12.9 G/DL — LOW (ref 13–17)
MAGNESIUM SERPL-MCNC: 2.1 MG/DL — SIGNIFICANT CHANGE UP (ref 1.6–2.6)
MCHC RBC-ENTMCNC: 31.2 PG — SIGNIFICANT CHANGE UP (ref 27–34)
MCHC RBC-ENTMCNC: 36.9 G/DL — HIGH (ref 32–36)
MCV RBC AUTO: 84.5 FL — SIGNIFICANT CHANGE UP (ref 80–100)
NRBC BLD AUTO-RTO: 0 /100 WBCS — SIGNIFICANT CHANGE UP (ref 0–0)
PHOSPHATE SERPL-MCNC: 3.1 MG/DL — SIGNIFICANT CHANGE UP (ref 2.5–4.5)
PLATELET # BLD AUTO: 287 K/UL — SIGNIFICANT CHANGE UP (ref 150–400)
POTASSIUM SERPL-MCNC: 3.9 MMOL/L — SIGNIFICANT CHANGE UP (ref 3.5–5.3)
POTASSIUM SERPL-MCNC: 4.2 MMOL/L — SIGNIFICANT CHANGE UP (ref 3.5–5.3)
POTASSIUM SERPL-SCNC: 3.9 MMOL/L — SIGNIFICANT CHANGE UP (ref 3.5–5.3)
POTASSIUM SERPL-SCNC: 4.2 MMOL/L — SIGNIFICANT CHANGE UP (ref 3.5–5.3)
PROT SERPL-MCNC: 6.6 G/DL — SIGNIFICANT CHANGE UP (ref 6–8.3)
RBC # BLD: 4.14 M/UL — LOW (ref 4.2–5.8)
RBC # FLD: 12.7 % — SIGNIFICANT CHANGE UP (ref 10.3–14.5)
SODIUM SERPL-SCNC: 124 MMOL/L — LOW (ref 135–145)
SODIUM SERPL-SCNC: 128 MMOL/L — LOW (ref 135–145)
URATE SERPL-MCNC: 3.2 MG/DL — LOW (ref 3.4–8.8)
WBC # BLD: 9.04 K/UL — SIGNIFICANT CHANGE UP (ref 3.8–10.5)
WBC # FLD AUTO: 9.04 K/UL — SIGNIFICANT CHANGE UP (ref 3.8–10.5)

## 2025-06-21 RX ADMIN — ENOXAPARIN SODIUM 40 MILLIGRAM(S): 100 INJECTION SUBCUTANEOUS at 05:52

## 2025-06-21 RX ADMIN — Medication 2 TABLET(S): at 21:48

## 2025-06-21 RX ADMIN — Medication 50 MILLIGRAM(S): at 05:52

## 2025-06-21 RX ADMIN — Medication 50 MILLIGRAM(S): at 12:05

## 2025-06-21 RX ADMIN — Medication 50 MILLIGRAM(S): at 21:46

## 2025-06-21 RX ADMIN — Medication 1 APPLICATION(S): at 06:02

## 2025-06-21 NOTE — PROGRESS NOTE ADULT - ASSESSMENT
1. Hyponatremia with unknown duration most likely chronic. Pt is clinically euvolemic. Multifactorial from High ADH state and Adrenal insuff.  -Na improved to 128; continue to observe.   -low uric acid and f/u cortisol in am.   -continue Fluid restriction 800ml to 1L/day.   -Check Seurm Na bid. Monitor I/O's daily. Avoid overcorrection of NA (8-10meq/day)  2. HTN:   -bp is low   -hold bp meds  -monitor bp.  3. h/o Adrenal inuff:  -on iv hydrocortisone  -Endo consulted.   4. Hypophosphatemia:  -phos improved.   -monitor phos

## 2025-06-21 NOTE — PROGRESS NOTE ADULT - ASSESSMENT
65M PMH HTN, HLD, depression, and melanoma presenting with nausea and vomiting x 4 days. Admitted to ICU for severe hyponatremia.    #hyponatremia 2/2 SIADH?  #adrenal insufficiency  #HTN  #HLD  #melanoma    =================== Neuro============================  AAOX4  no active issues      ================= Cardiovascular==========================  #HTN  takes losartan 100mg qd and amlodipine 10mg qd at home  will hold home meds at this time   /80  monitor BP, may resume HTN meds if BP is elevated      ================- Pulm=================================  no active issues    ==================ID===================================  no active issues    ================= Nephro================================  #severe hyponatremia  presenting with generalized weakness, nausea, vomiting (3-5 episodes of NBNB emesis daily) and lethargy x 4 days  Na 108  hyponatremia likely SIADH 2/2 possible cortisol crisis  takes hydrocortisone 10mg BID as per patient  c/w hydrocort taper as per endo recs  was initially on fluid restriction, transitioned to 3% NS, corrected to 117 and was placed on fluid restriction again.  Repeat sodium was 128 and d5% was started for 2 hours and fluid restriction was continued    - f/u free cortisol level, uric acid  - Na 126   - trend Na q6  - Endo consulted: Dr Syed  - Nephabdi consulted: Dr Suresh    =================GI====================================  #nausea and vomiting  2/2 severe hyponatremia  will give zofran PRN and correct underlying cause    - tolerating regular dash diet      ================ Heme==================================  no active issues    =================Endocrine===============================  #adrenal insufficiency  takes hydrocortisone 10mg BID at home  Endo recs for taper- started on hydrocort 50 q8 (day1/2)    - f/u free cortisol level  - Dr Kunal consulted    ================= Skin/Catheters============================  Peripheral IV lines   Olivares catheter   Patient consented for A line and CVC     =================Prophylaxis =============================  DVT prophylaxis   GI prophylaxis     ==================GOC==================================  FULL CODE   Disposition      65M PMH HTN, HLD, depression, and melanoma presenting with nausea and vomiting x 4 days. Admitted to ICU for severe hyponatremia.    #hyponatremia 2/2 SIADH?  #adrenal insufficiency  #HTN  #HLD  #melanoma    =================== Neuro============================  AAOX4  no active issues      ================= Cardiovascular==========================  #HTN  takes losartan 100mg qd and amlodipine 10mg qd at home  will hold home meds at this time   /80  monitor BP, may resume HTN meds if BP is elevated      ================- Pulm=================================  no active issues    ==================ID===================================  no active issues    ================= Nephro================================  #severe hyponatremia  presenting with generalized weakness, nausea, vomiting (3-5 episodes of NBNB emesis daily) and lethargy x 4 days  Na 108  hyponatremia likely SIADH 2/2 possible cortisol crisis  takes hydrocortisone 10mg BID as per patient  c/w hydrocort taper as per endo recs  was initially on fluid restriction, transitioned to 3% NS, corrected to 117 and was placed on fluid restriction again.  Repeat sodium was 128 and d5% was started for 2 hours and fluid restriction was continued  Uric acid 3.2    - f/u free cortisol level  - Na 126   - trend Na q12  - Endo consulted: Dr Kunal Reyes Nephabdi consulted: Dr Suresh    =================GI====================================  #nausea and vomiting  2/2 severe hyponatremia  will give zofran PRN and correct underlying cause    - tolerating regular dash diet      ================ Heme==================================  no active issues    =================Endocrine===============================  #adrenal insufficiency  takes hydrocortisone 10mg BID at home  Endo recs for taper- started on hydrocort 50 q8 (day2/2)    - f/u free cortisol level  - Dr Kunal consulted    ================= Skin/Catheters============================  Peripheral IV lines   Patient consented for A line and CVC     =================Prophylaxis =============================  DVT prophylaxis   GI prophylaxis     ==================GOC==================================  FULL CODE   Disposition

## 2025-06-21 NOTE — PROGRESS NOTE ADULT - SUBJECTIVE AND OBJECTIVE BOX
INTERVAL HPI/OVERNIGHT EVENTS:       PRESSORS: [ ] YES [ ] NO  WHICH:    ANTIBIOTICS:                  DATE STARTED:  ANTIBIOTICS:                  DATE STARTED:    Antimicrobial:    Cardiovascular:    Pulmonary:    Hematalogic:  enoxaparin Injectable 40 milliGRAM(s) SubCutaneous every 24 hours    Other:  chlorhexidine 2% Cloths 1 Application(s) Topical <User Schedule>  hydrocortisone sodium succinate Injectable 50 milliGRAM(s) IV Push every 8 hours  ondansetron    Tablet 4 milliGRAM(s) Oral daily PRN  polyethylene glycol 3350 17 Gram(s) Oral at bedtime  senna 2 Tablet(s) Oral at bedtime    chlorhexidine 2% Cloths 1 Application(s) Topical <User Schedule>  enoxaparin Injectable 40 milliGRAM(s) SubCutaneous every 24 hours  hydrocortisone sodium succinate Injectable 50 milliGRAM(s) IV Push every 8 hours  ondansetron    Tablet 4 milliGRAM(s) Oral daily PRN  polyethylene glycol 3350 17 Gram(s) Oral at bedtime  senna 2 Tablet(s) Oral at bedtime    Drug Dosing Weight  Height (cm): 182.9 (18 Jun 2025 10:13)  Weight (kg): 90.5 (18 Jun 2025 15:00)  BMI (kg/m2): 27.1 (18 Jun 2025 15:00)  BSA (m2): 2.13 (18 Jun 2025 15:00)    PHYSICAL EXAM:  GENERAL: NAD  EYES: EOMI, PERRLA  NECK: Supple, No JVD; Normal thyroid; Trachea midline: No LAD   NERVOUS SYSTEM:  Alert & Oriented X3,  Motor Strength 5/5 B/L upper and lower extremities; DTRs 2+ intact and symmetric  CHEST/LUNG: No rales, rhonchi, wheezing, breath sounds present bilaterally  HEART: Regular rate and rhythm; No murmurs, no gallops  ABDOMEN: Soft, Nontender, Nondistended; Bowel sounds present, no pain or masses on palpation  : voiding well, Montanez in place  EXTREMITIES:  2+ Peripheral Pulses, No clubbing, cyanosis, or edema  SKIN: warm, intact, no lesions     LINES/DRAINS/DEVICES  CENTRAL LINE: [ ] YES [ ] NO  LOCATION:     MONTANEZ: [ ] YES [ ] NO     A-LINE:  [ ] YES [ ] NO  LOCATION:       ICU Vital Signs Last 24 Hrs  T(C): 35.9 (20 Jun 2025 23:00), Max: 36.7 (20 Jun 2025 07:00)  T(F): 96.7 (20 Jun 2025 23:00), Max: 98 (20 Jun 2025 07:00)  HR: 77 (21 Jun 2025 04:00) (77 - 106)  BP: 131/82 (21 Jun 2025 04:00) (77/67 - 134/87)  BP(mean): 96 (21 Jun 2025 04:00) (72 - 103)  ABP: --  ABP(mean): --  RR: 15 (21 Jun 2025 04:00) (12 - 22)  SpO2: 98% (21 Jun 2025 04:00) (95% - 100%)    O2 Parameters below as of 20 Jun 2025 13:00  Patient On (Oxygen Delivery Method): room air                  06-19 @ 07:01  -  06-20 @ 07:00  --------------------------------------------------------  IN: 960 mL / OUT: 1950 mL / NET: -990 mL              LABS:  CBC Full  -  ( 21 Jun 2025 03:15 )  WBC Count : 9.04 K/uL  RBC Count : 4.14 M/uL  Hemoglobin : 12.9 g/dL  Hematocrit : 35.0 %  Platelet Count - Automated : 287 K/uL  Mean Cell Volume : 84.5 fl  Mean Cell Hemoglobin : 31.2 pg  Mean Cell Hemoglobin Concentration : 36.9 g/dL  Auto Neutrophil # : x  Auto Lymphocyte # : x  Auto Monocyte # : x  Auto Eosinophil # : x  Auto Basophil # : x  Auto Neutrophil % : x  Auto Lymphocyte % : x  Auto Monocyte % : x  Auto Eosinophil % : x  Auto Basophil % : x    06-21    128[L]  |  100  |  17  ----------------------------<  96  3.9   |  23  |  0.90    Ca    8.4      21 Jun 2025 03:15  Phos  3.1     06-21  Mg     2.1     06-21    TPro  6.6  /  Alb  3.2[L]  /  TBili  0.3  /  DBili  x   /  AST  22  /  ALT  47  /  AlkPhos  55  06-21      Urinalysis Basic - ( 21 Jun 2025 03:15 )    Color: x / Appearance: x / SG: x / pH: x  Gluc: 96 mg/dL / Ketone: x  / Bili: x / Urobili: x   Blood: x / Protein: x / Nitrite: x   Leuk Esterase: x / RBC: x / WBC x   Sq Epi: x / Non Sq Epi: x / Bacteria: x          RADIOLOGY & ADDITIONAL STUDIES REVIEWED DURING TEAM ROUNDS    [ ]GOALS OF CARE DISCUSSION WITH PATIENT/FAMILY/PROXY:    CRITICAL CARE TIME SPENT: 35 minutes   INTERVAL HPI/OVERNIGHT EVENTS: No acute events overnight.  Patient examined at bedside this AM.  Patient denies acute complaints of headache and nausea.      PRESSORS: [ ] YES [ ] NO  WHICH:    ANTIBIOTICS:                  DATE STARTED:  ANTIBIOTICS:                  DATE STARTED:    Antimicrobial:    Cardiovascular:    Pulmonary:    Hematalogic:  enoxaparin Injectable 40 milliGRAM(s) SubCutaneous every 24 hours    Other:  chlorhexidine 2% Cloths 1 Application(s) Topical <User Schedule>  hydrocortisone sodium succinate Injectable 50 milliGRAM(s) IV Push every 8 hours  ondansetron    Tablet 4 milliGRAM(s) Oral daily PRN  polyethylene glycol 3350 17 Gram(s) Oral at bedtime  senna 2 Tablet(s) Oral at bedtime    chlorhexidine 2% Cloths 1 Application(s) Topical <User Schedule>  enoxaparin Injectable 40 milliGRAM(s) SubCutaneous every 24 hours  hydrocortisone sodium succinate Injectable 50 milliGRAM(s) IV Push every 8 hours  ondansetron    Tablet 4 milliGRAM(s) Oral daily PRN  polyethylene glycol 3350 17 Gram(s) Oral at bedtime  senna 2 Tablet(s) Oral at bedtime    Drug Dosing Weight  Height (cm): 182.9 (18 Jun 2025 10:13)  Weight (kg): 90.5 (18 Jun 2025 15:00)  BMI (kg/m2): 27.1 (18 Jun 2025 15:00)  BSA (m2): 2.13 (18 Jun 2025 15:00)    PHYSICAL EXAM:  GENERAL: NAD  EYES: EOMI, PERRLA  NECK: Supple, No JVD; Normal thyroid; Trachea midline: No LAD   NERVOUS SYSTEM:  Alert & Oriented X3,  Motor Strength 5/5 B/L upper and lower extremities; DTRs 2+ intact and symmetric  CHEST/LUNG: No rales, rhonchi, wheezing, breath sounds present bilaterally  HEART: Regular rate and rhythm; No murmurs, no gallops  ABDOMEN: Soft, Nontender, Nondistended; Bowel sounds present, no pain or masses on palpation  : voiding well, Montanez in place  EXTREMITIES:  2+ Peripheral Pulses, No clubbing, cyanosis, or edema  SKIN: warm, intact, no lesions     LINES/DRAINS/DEVICES  CENTRAL LINE: [ ] YES [ ] NO  LOCATION:     MONTANEZ: [ ] YES [ ] NO     A-LINE:  [ ] YES [ ] NO  LOCATION:       ICU Vital Signs Last 24 Hrs  T(C): 35.9 (20 Jun 2025 23:00), Max: 36.7 (20 Jun 2025 07:00)  T(F): 96.7 (20 Jun 2025 23:00), Max: 98 (20 Jun 2025 07:00)  HR: 77 (21 Jun 2025 04:00) (77 - 106)  BP: 131/82 (21 Jun 2025 04:00) (77/67 - 134/87)  BP(mean): 96 (21 Jun 2025 04:00) (72 - 103)  ABP: --  ABP(mean): --  RR: 15 (21 Jun 2025 04:00) (12 - 22)  SpO2: 98% (21 Jun 2025 04:00) (95% - 100%)    O2 Parameters below as of 20 Jun 2025 13:00  Patient On (Oxygen Delivery Method): room air                  06-19 @ 07:01  -  06-20 @ 07:00  --------------------------------------------------------  IN: 960 mL / OUT: 1950 mL / NET: -990 mL              LABS:  CBC Full  -  ( 21 Jun 2025 03:15 )  WBC Count : 9.04 K/uL  RBC Count : 4.14 M/uL  Hemoglobin : 12.9 g/dL  Hematocrit : 35.0 %  Platelet Count - Automated : 287 K/uL  Mean Cell Volume : 84.5 fl  Mean Cell Hemoglobin : 31.2 pg  Mean Cell Hemoglobin Concentration : 36.9 g/dL  Auto Neutrophil # : x  Auto Lymphocyte # : x  Auto Monocyte # : x  Auto Eosinophil # : x  Auto Basophil # : x  Auto Neutrophil % : x  Auto Lymphocyte % : x  Auto Monocyte % : x  Auto Eosinophil % : x  Auto Basophil % : x    06-21    128[L]  |  100  |  17  ----------------------------<  96  3.9   |  23  |  0.90    Ca    8.4      21 Jun 2025 03:15  Phos  3.1     06-21  Mg     2.1     06-21    TPro  6.6  /  Alb  3.2[L]  /  TBili  0.3  /  DBili  x   /  AST  22  /  ALT  47  /  AlkPhos  55  06-21      Urinalysis Basic - ( 21 Jun 2025 03:15 )    Color: x / Appearance: x / SG: x / pH: x  Gluc: 96 mg/dL / Ketone: x  / Bili: x / Urobili: x   Blood: x / Protein: x / Nitrite: x   Leuk Esterase: x / RBC: x / WBC x   Sq Epi: x / Non Sq Epi: x / Bacteria: x          RADIOLOGY & ADDITIONAL STUDIES REVIEWED DURING TEAM ROUNDS    [ ]GOALS OF CARE DISCUSSION WITH PATIENT/FAMILY/PROXY:    CRITICAL CARE TIME SPENT: 35 minutes

## 2025-06-21 NOTE — CHART NOTE - NSCHARTNOTEFT_GEN_A_CORE
65M PMH HTN, HLD, depression, adrenal insufficiency, and melanoma presenting with nausea and vomiting x 4 days. Takes hydrocortisone 10mg BID as per patient. Endorses no missed doses of hydrocortisone at home, no new medications or any abnormal food ingestion. Na was found to be 108. Admitted to ICU for severe hyponatremia. Urine lytes were ordered, hyponatremia likely 2/2 SIADH iso possible cortisol crisis.  Pt was initially on fluid restriction, transitioned to 3% NS. Na corrected to 117, and was placed on fluid restriction again. Repeat sodium was 128 and d5% was started for 2 hours and fluid restriction was continued, resolving the severe hyponatremia. Nephrology was consulted, recommended that Na be trended BID for now.    c/w hydrocort taper as per endo recs      - f/u free cortisol level, uric acid  - Na 126   - trend Na q6  - Endo consulted: Dr Syed  - Nephro consulted: Dr Suresh    -Continue with Hydrocortisone 50q6 today  until Na keeps trending up and continues to normalize, then taper.  Taper to 50q8 for 2 days, then  Taper to 50q12 for 2 days, then  Taper to 25q12 for 2 days, then  Taper to 15q12 for 2 days, then  Taper to 10q12 for 2 days  then continue hydrocortisone 10 mg q 12 hour lifelong 65M PMH HTN, HLD, depression, adrenal insufficiency, and melanoma presenting with nausea and vomiting x 4 days. Takes hydrocortisone 10mg BID as per patient. Endorses no missed doses of hydrocortisone at home, no new medications or any abnormal food ingestion. Na was found to be 108. Admitted to ICU for severe hyponatremia. Urine lytes were ordered, hyponatremia likely 2/2 SIADH iso possible cortisol crisis.  Pt was initially on fluid restriction, transitioned to 3% NS. Na corrected to 117, and was placed on fluid restriction again. Repeat sodium was 128 and d5% was started for 2 hours and fluid restriction was continued, resolving the severe hyponatremia. Nephrology was consulted, recommended that Na be trended BID for now. Endocrine was consulted for possible cortisol crisis. Pt was started on Hydrocortisone 50mg IV q6, and then tapered down to hydrocortisone 50mg IV q8 (day 2/2). Given patient's improved clinical status and current hemodynamic stability, decision was made to downgrade him to the medical floors.     Pt signed out to floor attending  ____ and the resident ____.    To follow up:  [ ] f/u free cortisol level  [ ] Na from BMP at 4PM  [ ] Start Hydrocortisone 50mg IV q12 from tomorrow for 2 days  [ ] c/w hydrocortisone taper as per Endocrine recs from 6/24:  - Taper to 25q12 for 2 days, then  - Taper to 15q12 for 2 days, then  - Taper to 10q12 for 2 days  - then continue hydrocortisone 10 mg q 12 hour lifelong  [ ] f/u PT consult recommendations 65M PMH HTN, HLD, depression, adrenal insufficiency, and melanoma presenting with nausea and vomiting x 4 days. Takes hydrocortisone 10mg BID as per patient. Endorses no missed doses of hydrocortisone at home, no new medications or any abnormal food ingestion. Na was found to be 108. Admitted to ICU for severe hyponatremia. Urine lytes were ordered, hyponatremia likely 2/2 SIADH iso possible cortisol crisis.  Pt was initially on fluid restriction (800cc in diet), transitioned to 3% NS. Na corrected to 117, and was placed on fluid restriction again. Repeat sodium was 128 and d5% was started for 2 hours and fluid restriction was continued, resolving the severe hyponatremia. Nephrology was consulted, recommended that Na be trended BID for now. Endocrine was consulted for possible cortisol crisis. Pt was started on Hydrocortisone 50mg IV q6, and then tapered down to hydrocortisone 50mg IV q8 (day 2/2). Given patient's improved clinical status and current hemodynamic stability, decision was made to downgrade him to the medical floors.     Pt signed out to floor attending  ____ and the resident ____.    To follow up:  [ ] f/u free cortisol level  [ ] Na from BMP at 4PM  [ ] Start Hydrocortisone 50mg IV q12 from tomorrow for 2 days  [ ] c/w hydrocortisone taper as per Endocrine recs from 6/24:  - Taper to 25q12 for 2 days, then  - Taper to 15q12 for 2 days, then  - Taper to 10q12 for 2 days  - then continue hydrocortisone 10 mg q 12 hour lifelong  [ ] f/u PT consult recommendations 65M PMH HTN, HLD, depression, adrenal insufficiency, and melanoma presenting with nausea and vomiting x 4 days. Takes hydrocortisone 10mg BID as per patient. Endorses no missed doses of hydrocortisone at home, no new medications or any abnormal food ingestion. Na was found to be 108. Admitted to ICU for severe hyponatremia. Urine lytes were ordered, hyponatremia likely 2/2 SIADH iso possible cortisol crisis.  Pt was initially on fluid restriction (800cc in diet), transitioned to 3% NS. Na corrected to 117, and was placed on fluid restriction again. Repeat sodium was 128 and d5% was started for 2 hours and fluid restriction was continued, resolving the severe hyponatremia. Nephrology was consulted, recommended that Na be trended BID for now. Endocrine was consulted for possible cortisol crisis. Pt was started on Hydrocortisone 50mg IV q6, and then tapered down to hydrocortisone 50mg IV q8 (day 2/2). Given patient's improved clinical status and current hemodynamic stability, decision was made to downgrade him to the medical floors.     Pt signed out to floor attending Dr Cuba and the NP Annalee.    To follow up:  [ ] f/u free cortisol level  [ ] Na from BMP at 4PM  [ ] Start Hydrocortisone 50mg IV q12 from tomorrow for 2 days  [ ] c/w hydrocortisone taper as per Endocrine recs from 6/24:  - Taper to 25q12 for 2 days, then  - Taper to 15q12 for 2 days, then  - Taper to 10q12 for 2 days  - then continue hydrocortisone 10 mg q 12 hour lifelong  [ ] f/u PT consult recommendations No

## 2025-06-21 NOTE — PROGRESS NOTE ADULT - SUBJECTIVE AND OBJECTIVE BOX
NEPHROLOGY MEDICAL CARE, [facility] - Dr. Dequan Suresh/ Dr. Mary Lance/ Dr. Jean Paul Smalls/ Dr. Shawn Alvarez    Date of Service: 06-21-25    Patient was seen and examined at bedside.    CC: patient is okay and NAD    Vital Signs Last 24 Hrs  T(C): 36.5 (21 Jun 2025 04:00), Max: 36.5 (20 Jun 2025 17:10)  T(F): 97.7 (21 Jun 2025 04:00), Max: 97.7 (20 Jun 2025 17:10)  HR: 88 (21 Jun 2025 07:00) (77 - 106)  BP: 126/87 (21 Jun 2025 07:00) (77/67 - 147/97)  BP(mean): 100 (21 Jun 2025 07:00) (72 - 114)  RR: 18 (21 Jun 2025 07:00) (12 - 22)  SpO2: 92% (21 Jun 2025 07:00) (92% - 100%)    Parameters below as of 20 Jun 2025 13:00  Patient On (Oxygen Delivery Method): room air        06-20 @ 07:01  -  06-21 @ 07:00  --------------------------------------------------------  IN: 500 mL / OUT: 1230 mL / NET: -730 mL        PHYSICAL EXAM:  General: No acute respiratory distress.  Eyes: conjunctiva and sclera clear  ENMT: Atraumatic, Normocephalic, supple, No JVD present. Moist mucous membranes  Respiratory: Bilateral clear lungs; No rales, rhonchi, wheezing  Cardiovascular: S1S2+; no m/r/g  Gastrointestinal: Soft, Non-tender, Nondistended; Bowel sounds present,   Neuro:  Awake, Alert & Oriented X3  Ext:   No edema, No Cyanosis  Skin: No visible rashes    MEDICATIONS:  MEDICATIONS  (STANDING):  chlorhexidine 2% Cloths 1 Application(s) Topical <User Schedule>  enoxaparin Injectable 40 milliGRAM(s) SubCutaneous every 24 hours  hydrocortisone sodium succinate Injectable 50 milliGRAM(s) IV Push every 8 hours  polyethylene glycol 3350 17 Gram(s) Oral at bedtime  senna 2 Tablet(s) Oral at bedtime    MEDICATIONS  (PRN):  ondansetron    Tablet 4 milliGRAM(s) Oral daily PRN Nausea and/or Vomiting          LABS:                        12.9   9.04  )-----------( 287      ( 21 Jun 2025 03:15 )             35.0     06-21    128[L]  |  100  |  17  ----------------------------<  96  3.9   |  23  |  0.90    Ca    8.4      21 Jun 2025 03:15  Phos  3.1     06-21  Mg     2.1     06-21    TPro  6.6  /  Alb  3.2[L]  /  TBili  0.3  /  DBili  x   /  AST  22  /  ALT  47  /  AlkPhos  55  06-21      Urinalysis Basic - ( 21 Jun 2025 03:15 )    Color: x / Appearance: x / SG: x / pH: x  Gluc: 96 mg/dL / Ketone: x  / Bili: x / Urobili: x   Blood: x / Protein: x / Nitrite: x   Leuk Esterase: x / RBC: x / WBC x   Sq Epi: x / Non Sq Epi: x / Bacteria: x      Magnesium: 2.1 mg/dL (06-21 @ 03:15)  Phosphorus: 3.1 mg/dL (06-21 @ 03:15)    Urine studies  Sodium, Random Urine: 57 mmol/L (06-18 @ 13:45)  Creatinine, Random Urine: 48 mg/dL (06-18 @ 13:45)  Osmolality, Random Urine: 319 mos/kg (06-18 @ 13:45)    PTH and Vit D:

## 2025-06-21 NOTE — PHYSICAL THERAPY INITIAL EVALUATION ADULT - CRITERIA FOR SKILLED THERAPEUTIC INTERVENTIONS
JOE pending functional progress/impairments found/functional limitations in following categories/anticipated discharge recommendation

## 2025-06-21 NOTE — PHYSICAL THERAPY INITIAL EVALUATION ADULT - GENERAL OBSERVATIONS, REHAB EVAL
Pt received sitting in chair with russo and cardiac monitor, was cooperative during assessment; denied c/o pain however reported slowness of movement

## 2025-06-21 NOTE — PHYSICAL THERAPY INITIAL EVALUATION ADULT - GAIT DISTANCE, PT EVAL
pt was able to take 3 steps forward and backward and marching in place with mod A (+) ataxic gait; unstable

## 2025-06-22 LAB
ALBUMIN SERPL ELPH-MCNC: 3.3 G/DL — LOW (ref 3.5–5)
ALP SERPL-CCNC: 55 U/L — SIGNIFICANT CHANGE UP (ref 40–120)
ALT FLD-CCNC: 43 U/L DA — SIGNIFICANT CHANGE UP (ref 10–60)
ANION GAP SERPL CALC-SCNC: 7 MMOL/L — SIGNIFICANT CHANGE UP (ref 5–17)
AST SERPL-CCNC: 20 U/L — SIGNIFICANT CHANGE UP (ref 10–40)
BILIRUB SERPL-MCNC: 0.4 MG/DL — SIGNIFICANT CHANGE UP (ref 0.2–1.2)
BUN SERPL-MCNC: 14 MG/DL — SIGNIFICANT CHANGE UP (ref 7–18)
CALCIUM SERPL-MCNC: 9.1 MG/DL — SIGNIFICANT CHANGE UP (ref 8.4–10.5)
CHLORIDE SERPL-SCNC: 100 MMOL/L — SIGNIFICANT CHANGE UP (ref 96–108)
CO2 SERPL-SCNC: 23 MMOL/L — SIGNIFICANT CHANGE UP (ref 22–31)
CREAT SERPL-MCNC: 0.87 MG/DL — SIGNIFICANT CHANGE UP (ref 0.5–1.3)
EGFR: 96 ML/MIN/1.73M2 — SIGNIFICANT CHANGE UP
EGFR: 96 ML/MIN/1.73M2 — SIGNIFICANT CHANGE UP
GLUCOSE SERPL-MCNC: 92 MG/DL — SIGNIFICANT CHANGE UP (ref 70–99)
HCT VFR BLD CALC: 36 % — LOW (ref 39–50)
HGB BLD-MCNC: 13.2 G/DL — SIGNIFICANT CHANGE UP (ref 13–17)
MAGNESIUM SERPL-MCNC: 2.1 MG/DL — SIGNIFICANT CHANGE UP (ref 1.6–2.6)
MCHC RBC-ENTMCNC: 31 PG — SIGNIFICANT CHANGE UP (ref 27–34)
MCHC RBC-ENTMCNC: 36.7 G/DL — HIGH (ref 32–36)
MCV RBC AUTO: 84.5 FL — SIGNIFICANT CHANGE UP (ref 80–100)
NRBC BLD AUTO-RTO: 0 /100 WBCS — SIGNIFICANT CHANGE UP (ref 0–0)
PHOSPHATE SERPL-MCNC: 3.9 MG/DL — SIGNIFICANT CHANGE UP (ref 2.5–4.5)
PLATELET # BLD AUTO: 318 K/UL — SIGNIFICANT CHANGE UP (ref 150–400)
POTASSIUM SERPL-MCNC: 4 MMOL/L — SIGNIFICANT CHANGE UP (ref 3.5–5.3)
POTASSIUM SERPL-SCNC: 4 MMOL/L — SIGNIFICANT CHANGE UP (ref 3.5–5.3)
PROT SERPL-MCNC: 6.9 G/DL — SIGNIFICANT CHANGE UP (ref 6–8.3)
RBC # BLD: 4.26 M/UL — SIGNIFICANT CHANGE UP (ref 4.2–5.8)
RBC # FLD: 12.4 % — SIGNIFICANT CHANGE UP (ref 10.3–14.5)
SODIUM SERPL-SCNC: 130 MMOL/L — LOW (ref 135–145)
WBC # BLD: 8.84 K/UL — SIGNIFICANT CHANGE UP (ref 3.8–10.5)
WBC # FLD AUTO: 8.84 K/UL — SIGNIFICANT CHANGE UP (ref 3.8–10.5)

## 2025-06-22 RX ORDER — OLANZAPINE 10 MG/1
2.5 TABLET ORAL ONCE
Refills: 0 | Status: DISCONTINUED | OUTPATIENT
Start: 2025-06-22 | End: 2025-06-27

## 2025-06-22 RX ORDER — HYDROCORTISONE 20 MG
50 TABLET ORAL EVERY 12 HOURS
Refills: 0 | Status: DISCONTINUED | OUTPATIENT
Start: 2025-06-22 | End: 2025-06-23

## 2025-06-22 RX ADMIN — ENOXAPARIN SODIUM 40 MILLIGRAM(S): 100 INJECTION SUBCUTANEOUS at 05:58

## 2025-06-22 RX ADMIN — Medication 50 MILLIGRAM(S): at 05:57

## 2025-06-22 RX ADMIN — Medication 50 MILLIGRAM(S): at 17:04

## 2025-06-22 NOTE — PROGRESS NOTE ADULT - SUBJECTIVE AND OBJECTIVE BOX
Patient is a 65y old  Male who presents with a chief complaint of severe hyponatremia, cortisol crisis (21 Jun 2025 07:08)    PATIENT IS SEEN AND EXAMINED IN MEDICAL FLOOR.    NEVIN [    ]    LISSETH [   ]      GT [   ]    ALLERGIES:  No Known Allergies      Daily     Daily     VITALS:    Vital Signs Last 24 Hrs  T(C): 36.8 (22 Jun 2025 05:00), Max: 36.8 (21 Jun 2025 17:07)  T(F): 98.2 (22 Jun 2025 05:00), Max: 98.3 (21 Jun 2025 17:07)  HR: 89 (22 Jun 2025 05:00) (84 - 92)  BP: 146/84 (22 Jun 2025 05:00) (129/83 - 146/84)  BP(mean): 105 (22 Jun 2025 05:00) (97 - 105)  RR: 18 (22 Jun 2025 05:00) (15 - 22)  SpO2: 98% (22 Jun 2025 05:00) (94% - 98%)    Parameters below as of 22 Jun 2025 05:00  Patient On (Oxygen Delivery Method): room air        LABS:    CBC Full  -  ( 22 Jun 2025 05:25 )  WBC Count : 8.84 K/uL  RBC Count : 4.26 M/uL  Hemoglobin : 13.2 g/dL  Hematocrit : 36.0 %  Platelet Count - Automated : 318 K/uL  Mean Cell Volume : 84.5 fl  Mean Cell Hemoglobin : 31.0 pg  Mean Cell Hemoglobin Concentration : 36.7 g/dL  Auto Neutrophil # : x  Auto Lymphocyte # : x  Auto Monocyte # : x  Auto Eosinophil # : x  Auto Basophil # : x  Auto Neutrophil % : x  Auto Lymphocyte % : x  Auto Monocyte % : x  Auto Eosinophil % : x  Auto Basophil % : x      06-22    130[L]  |  100  |  14  ----------------------------<  92  4.0   |  23  |  0.87    Ca    9.1      22 Jun 2025 05:25  Phos  3.9     06-22  Mg     2.1     06-22    TPro  6.9  /  Alb  3.3[L]  /  TBili  0.4  /  DBili  x   /  AST  20  /  ALT  43  /  AlkPhos  55  06-22    CAPILLARY BLOOD GLUCOSE            LIVER FUNCTIONS - ( 22 Jun 2025 05:25 )  Alb: 3.3 g/dL / Pro: 6.9 g/dL / ALK PHOS: 55 U/L / ALT: 43 U/L DA / AST: 20 U/L / GGT: x           Creatinine Trend: 0.87<--, 1.27<--, 0.90<--, 1.02<--, 1.10<--, 1.00<--  I&O's Summary              MEDICATIONS:    MEDICATIONS  (STANDING):  chlorhexidine 2% Cloths 1 Application(s) Topical <User Schedule>  enoxaparin Injectable 40 milliGRAM(s) SubCutaneous every 24 hours  hydrocortisone sodium succinate Injectable 50 milliGRAM(s) IV Push every 12 hours  polyethylene glycol 3350 17 Gram(s) Oral at bedtime  senna 2 Tablet(s) Oral at bedtime      MEDICATIONS  (PRN):  OLANZapine 2.5 milliGRAM(s) Oral once PRN agitation  ondansetron    Tablet 4 milliGRAM(s) Oral daily PRN Nausea and/or Vomiting        REVIEW OF SYSTEMS:                           ALL ROS DONE [ X   ]      CONSTITUTIONAL:  LETHARGIC [   ], FEVER [   ], UNRESPONSIVE [   ]  CVS:  CP  [   ], SOB, [   ], PALPITATIONS [   ], DIZZYNESS [   ]  RS: COUGH [   ], SPUTUM [   ]  GI: ABDOMINAL PAIN [   ], NAUSEA [   ], VOMITINGS [   ], DIARRHEA [   ], CONSTIPATION [   ]  :  DYSURIA [   ], NOCTURIA [   ], INCREASED FREQUENCY [   ], DRIBLING [   ],  SKELETAL: PAINFUL JOINTS [   ], SWOLLEN JOINTS [   ], NECK ACHE [   ], LOW BACK ACHE [   ],  SKIN : ULCERS [   ], RASH [   ], ITCHING [   ]  CNS: HEAD ACHE [   ], DOUBLE VISION [   ], BLURRED VISION [   ], AMS / CONFUSION [   ], SEIZURES [   ], WEAKNESS [   ],TINGLING / NUMBNESS [   ]      PHYSICAL EXAMINATION:    GENERAL APPEARANCE: NO DISTRESS  HEENT:  NO PALLOR, NO  JVD,  NO   NODES, NECK SUPPLE  CVS: S1 +, S2 +,   RS: AEEB,  OCCASIONAL  RALES +,   NO RONCHI  ABD: SOFT, NT, NO, BS +  EXT: NO PE  SKIN: WARM,   SKELETAL:  ROM ACCEPTABLE  CNS:  AAO X    ,   DEFICITS        RADIOLOGY :          ASSESSMENT :     Hyponatremia, hypo-osmolarity, or hypo-osmolar hyponatremia    Depression    No significant past surgical history        PLAN:  HPI:  65M PMH HTN, HLD, depression, and melanoma presenting with nausea and vomiting x 4 days. He states that he had URI symptoms one week ago and since then has had decreased appetite. He reported that he had nausea and vomiting since Saturday, where he had 3-5 episodes of NBNB vomiting. He has had poor po intake since Saturday due to being nervous about feeling nauseous after, but has been drinking two to three 8 oz cups of water each day. He denies any dizziness, lightheadedness, blurry vision, or abdominal pain. He does report that he noticed his speech was slower than normal and that he is more lethargic. He denies missing any doses of his medications, including his hydrocortisone 10mg BID. He denies fevers, chills, CP, SOB, diarrhea, dysuria, numbness/tingling/weakness in extremities. He lives at home alone and ambulates independently.      (18 Jun 2025 14:06)    -      Patient is a 65y old  Male who presents with a chief complaint of severe hyponatremia, cortisol crisis (21 Jun 2025 07:08)    PATIENT IS SEEN AND EXAMINED IN MEDICAL FLOOR.    NEVIN [    ]    LISSETH [   ]      GT [   ]    ALLERGIES:  No Known Allergies      Daily     Daily     VITALS:    Vital Signs Last 24 Hrs  T(C): 36.8 (22 Jun 2025 05:00), Max: 36.8 (21 Jun 2025 17:07)  T(F): 98.2 (22 Jun 2025 05:00), Max: 98.3 (21 Jun 2025 17:07)  HR: 89 (22 Jun 2025 05:00) (84 - 92)  BP: 146/84 (22 Jun 2025 05:00) (129/83 - 146/84)  BP(mean): 105 (22 Jun 2025 05:00) (97 - 105)  RR: 18 (22 Jun 2025 05:00) (15 - 22)  SpO2: 98% (22 Jun 2025 05:00) (94% - 98%)    Parameters below as of 22 Jun 2025 05:00  Patient On (Oxygen Delivery Method): room air        LABS:    CBC Full  -  ( 22 Jun 2025 05:25 )  WBC Count : 8.84 K/uL  RBC Count : 4.26 M/uL  Hemoglobin : 13.2 g/dL  Hematocrit : 36.0 %  Platelet Count - Automated : 318 K/uL  Mean Cell Volume : 84.5 fl  Mean Cell Hemoglobin : 31.0 pg  Mean Cell Hemoglobin Concentration : 36.7 g/dL  Auto Neutrophil # : x  Auto Lymphocyte # : x  Auto Monocyte # : x  Auto Eosinophil # : x  Auto Basophil # : x  Auto Neutrophil % : x  Auto Lymphocyte % : x  Auto Monocyte % : x  Auto Eosinophil % : x  Auto Basophil % : x      06-22    130[L]  |  100  |  14  ----------------------------<  92  4.0   |  23  |  0.87    Ca    9.1      22 Jun 2025 05:25  Phos  3.9     06-22  Mg     2.1     06-22    TPro  6.9  /  Alb  3.3[L]  /  TBili  0.4  /  DBili  x   /  AST  20  /  ALT  43  /  AlkPhos  55  06-22    CAPILLARY BLOOD GLUCOSE            LIVER FUNCTIONS - ( 22 Jun 2025 05:25 )  Alb: 3.3 g/dL / Pro: 6.9 g/dL / ALK PHOS: 55 U/L / ALT: 43 U/L DA / AST: 20 U/L / GGT: x           Creatinine Trend: 0.87<--, 1.27<--, 0.90<--, 1.02<--, 1.10<--, 1.00<--  I&O's Summary              MEDICATIONS:    MEDICATIONS  (STANDING):  chlorhexidine 2% Cloths 1 Application(s) Topical <User Schedule>  enoxaparin Injectable 40 milliGRAM(s) SubCutaneous every 24 hours  hydrocortisone sodium succinate Injectable 50 milliGRAM(s) IV Push every 12 hours  polyethylene glycol 3350 17 Gram(s) Oral at bedtime  senna 2 Tablet(s) Oral at bedtime      MEDICATIONS  (PRN):  OLANZapine 2.5 milliGRAM(s) Oral once PRN agitation  ondansetron    Tablet 4 milliGRAM(s) Oral daily PRN Nausea and/or Vomiting        REVIEW OF SYSTEMS:                           ALL ROS DONE [ X   ]      CONSTITUTIONAL:  LETHARGIC [   ], FEVER [   ], UNRESPONSIVE [   ]  CVS:  CP  [   ], SOB, [   ], PALPITATIONS [   ], DIZZYNESS [   ]  RS: COUGH [   ], SPUTUM [   ]  GI: ABDOMINAL PAIN [   ], NAUSEA [   ], VOMITINGS [   ], DIARRHEA [   ], CONSTIPATION [   ]  :  DYSURIA [   ], NOCTURIA [   ], INCREASED FREQUENCY [   ], DRIBLING [   ],  SKELETAL: PAINFUL JOINTS [   ], SWOLLEN JOINTS [   ], NECK ACHE [   ], LOW BACK ACHE [   ],  SKIN : ULCERS [   ], RASH [   ], ITCHING [   ]  CNS: HEAD ACHE [   ], DOUBLE VISION [   ], BLURRED VISION [   ], AMS / CONFUSION [   ], SEIZURES [   ], WEAKNESS [   ],TINGLING / NUMBNESS [   ]      PHYSICAL EXAMINATION:    GENERAL APPEARANCE: NO DISTRESS  HEENT:  NO PALLOR, NO  JVD,  NO   NODES, NECK SUPPLE  CVS: S1 +, S2 +,   RS: AEEB,  OCCASIONAL  RALES +,   NO RONCHI  ABD: SOFT, NT, NO, BS +  EXT: NO PE  SKIN: WARM,   SKELETAL:  ROM REDUCED AT CERVICAL & LS SPINE  CNS:  AAO X 3 ,   POOR POWER IN ALL LIMBS        RADIOLOGY :    < from: CT Chest w/ IV Cont (06.18.25 @ 14:22) >  IMPRESSION:  No bowel obstruction. Visualized appendix appears unremarkable. Collapse   mild hiatal hernia versus distal esophageal mural thickening. EGD may be   pursued for further evaluation. Possible focal mural thickening at the   cecum. Colonoscopy may be pursued to rule out colon cancer. Moderate to   large amount of stool in the rectum.    < end of copied text >  < from: CT Head No Cont (06.18.25 @ 14:17) >  IMPRESSION:    No acute intracranial  hemorrhage, edema or mass effect.    This study cannot exclude the possibility of a isodense mass or possible   metastatic disease. A follow-up MRI or CT head with contrast would be   necessary for improved sensitivity.    < end of copied text >  < from: CT Maxillofacial w/ IV Cont (06.12.23 @ 13:20) >  IMPRESSION:  No evidence of recurrent tumor.    Status post interval left mandibular molar extraction.    Otherwise stable exam.    < end of copied text >        ASSESSMENT :     hyponatremia   Chronic adrenal insufficiency  Hypertension, HLD  Melanoma   - DEPRESSION, ANXIETY NEUROSIS/PANIC ATTACKS        PLAN:  HPI:  65M PMH HTN, HLD, depression, and melanoma presenting with nausea and vomiting x 4 days. He states that he had URI symptoms one week ago and since then has had decreased appetite. He reported that he had nausea and vomiting since Saturday, where he had 3-5 episodes of NBNB vomiting. He has had poor po intake since Saturday due to being nervous about feeling nauseous after, but has been drinking two to three 8 oz cups of water each day. He denies any dizziness, lightheadedness, blurry vision, or abdominal pain. He does report that he noticed his speech was slower than normal and that he is more lethargic. He denies missing any doses of his medications, including his hydrocortisone 10mg BID. He denies fevers, chills, CP, SOB, diarrhea, dysuria, numbness/tingling/weakness in extremities. He lives at home alone and ambulates independently.       # DC PLAN - PT EVALUATION AND PLACEMENT IN [facility] OR HOME WITH HOME PT  - DC OLANZEPINE  - CHANGE ORAL HYDROCORTISONE TO ORAL DOSE       # RESOLVED NAUSEA, VOMITING - ON ZOFRAN    # RESOLVED ACUTE URTI AND RESOLVING HEADACHES - SYMPTOMATIC PAIN Rx AS NEEDED  - MAXILLARY SINUSITIS    # SEVERE HYPONATREMIA - S/P ICU MONITORING, IV. NaCL 3%, IV. HYDROCORTISONE  # LIKELY SIADH - ON FLUID RESTRICTION 800 ML/DAY  # RESOLVING HYPOPHOSPHATEMIA    # CONSTIPATION - BOWEL REGIMEN AS NEEDED    # IMPAIRED GAIT DUE TO GENERALIZED MUSCLE WEAKNESS, CERVICAL & LS SPONDYLOPATHY, POLYARTHRITIS AND PERIPHERAL NEUROPATHY  # OP    # ADULT FAILURE TO THRIVE, SEVERE PROTEIN CALORIE MALNUTRITION - HIGH RISK FOR WORSENING MALNUTRITION     # HTN, HLD - ANTI HTN Rx ON HOLD DUE TO HYPOTENSION    # H/O ADRENAL INSUFFICIENCY - ON IV. HYDROCORTISONE 50 MG Q 12 HRS    # DEPRESSION, ANXIETY NEUROSIS - NOT ON ANY Rx AT HOME - GETS PSYCHOTHERAPY ALONE    # H/O MELANOMA    # GI AND DVT PROPHYLAXIS    DR. IVIS CAMARILLO

## 2025-06-22 NOTE — PROGRESS NOTE ADULT - SUBJECTIVE AND OBJECTIVE BOX
NEPHROLOGY MEDICAL CARE, [facility] - Dr. Dequan Suresh/ Dr. Mary Lance/ Dr. Jean Paul Smalls/ Dr. Shawn Alvarez    Date of Service: 06-22-25    Patient was seen and examined at bedside.    CC: patient is okay and NAd    Vital Signs Last 24 Hrs  T(C): 36.6 (22 Jun 2025 12:40), Max: 36.8 (22 Jun 2025 05:00)  T(F): 97.8 (22 Jun 2025 12:40), Max: 98.2 (22 Jun 2025 05:00)  HR: 86 (22 Jun 2025 12:40) (86 - 89)  BP: 154/88 (22 Jun 2025 12:40) (144/79 - 154/88)  BP(mean): 105 (22 Jun 2025 05:00) (105 - 105)  RR: 18 (22 Jun 2025 12:40) (18 - 20)  SpO2: 98% (22 Jun 2025 12:40) (98% - 98%)    Parameters below as of 22 Jun 2025 12:40  Patient On (Oxygen Delivery Method): room air          PHYSICAL EXAM:  General: No acute respiratory distress.  Eyes: conjunctiva and sclera clear  ENMT: Atraumatic, Normocephalic, supple, No JVD present. Moist mucous membranes  Respiratory: Bilateral clear lungs; No rales, rhonchi, wheezing  Cardiovascular: S1S2+; no m/r/g  Gastrointestinal: Soft, Non-tender, Nondistended; Bowel sounds present,   Neuro:  Awake, Alert & Oriented X3  Ext:   No edema, No Cyanosis  Skin: No visible rashes    MEDICATIONS:  MEDICATIONS  (STANDING):  chlorhexidine 2% Cloths 1 Application(s) Topical <User Schedule>  enoxaparin Injectable 40 milliGRAM(s) SubCutaneous every 24 hours  hydrocortisone sodium succinate Injectable 50 milliGRAM(s) IV Push every 12 hours  polyethylene glycol 3350 17 Gram(s) Oral at bedtime  senna 2 Tablet(s) Oral at bedtime    MEDICATIONS  (PRN):  OLANZapine 2.5 milliGRAM(s) Oral once PRN agitation  ondansetron    Tablet 4 milliGRAM(s) Oral daily PRN Nausea and/or Vomiting          LABS:                        13.2   8.84  )-----------( 318      ( 22 Jun 2025 05:25 )             36.0     06-22    130[L]  |  100  |  14  ----------------------------<  92  4.0   |  23  |  0.87    Ca    9.1      22 Jun 2025 05:25  Phos  3.9     06-22  Mg     2.1     06-22    TPro  6.9  /  Alb  3.3[L]  /  TBili  0.4  /  DBili  x   /  AST  20  /  ALT  43  /  AlkPhos  55  06-22      Urinalysis Basic - ( 22 Jun 2025 05:25 )    Color: x / Appearance: x / SG: x / pH: x  Gluc: 92 mg/dL / Ketone: x  / Bili: x / Urobili: x   Blood: x / Protein: x / Nitrite: x   Leuk Esterase: x / RBC: x / WBC x   Sq Epi: x / Non Sq Epi: x / Bacteria: x      Magnesium: 2.1 mg/dL (06-22 @ 05:25)  Phosphorus: 3.9 mg/dL (06-22 @ 05:25)    Urine studies    PTH and Vit D:

## 2025-06-22 NOTE — PROGRESS NOTE ADULT - ASSESSMENT
1. Hyponatremia with unknown duration most likely chronic. Pt is clinically euvolemic. Multifactorial from High ADH state and Adrenal insuff.  -Na improved to 130; continue to observe.   -low uric acid and f/u cortisol in am.   -continue Fluid restriction 800ml to 1L/day.   -Check Seurm Na daily. Monitor I/O's daily. Avoid overcorrection of NA (8-10meq/day)  2. HTN:   -bp is low   -hold bp meds  -monitor bp.  3. h/o Adrenal inuff:  -on iv hydrocortisone  -Endo consulted.   4. Hypophosphatemia:  -phos improved.   -monitor phos

## 2025-06-23 DIAGNOSIS — Z29.9 ENCOUNTER FOR PROPHYLACTIC MEASURES, UNSPECIFIED: ICD-10-CM

## 2025-06-23 DIAGNOSIS — E87.1 HYPO-OSMOLALITY AND HYPONATREMIA: ICD-10-CM

## 2025-06-23 DIAGNOSIS — I10 ESSENTIAL (PRIMARY) HYPERTENSION: ICD-10-CM

## 2025-06-23 DIAGNOSIS — E27.40 UNSPECIFIED ADRENOCORTICAL INSUFFICIENCY: ICD-10-CM

## 2025-06-23 DIAGNOSIS — Z75.8 OTHER PROBLEMS RELATED TO MEDICAL FACILITIES AND OTHER HEALTH CARE: ICD-10-CM

## 2025-06-23 DIAGNOSIS — E78.5 HYPERLIPIDEMIA, UNSPECIFIED: ICD-10-CM

## 2025-06-23 LAB
ALBUMIN SERPL ELPH-MCNC: 3.2 G/DL — LOW (ref 3.5–5)
ALP SERPL-CCNC: 56 U/L — SIGNIFICANT CHANGE UP (ref 40–120)
ALT FLD-CCNC: 36 U/L DA — SIGNIFICANT CHANGE UP (ref 10–60)
ANION GAP SERPL CALC-SCNC: 5 MMOL/L — SIGNIFICANT CHANGE UP (ref 5–17)
AST SERPL-CCNC: 16 U/L — SIGNIFICANT CHANGE UP (ref 10–40)
BILIRUB SERPL-MCNC: 0.4 MG/DL — SIGNIFICANT CHANGE UP (ref 0.2–1.2)
BUN SERPL-MCNC: 15 MG/DL — SIGNIFICANT CHANGE UP (ref 7–18)
CALCIUM SERPL-MCNC: 9.1 MG/DL — SIGNIFICANT CHANGE UP (ref 8.4–10.5)
CHLORIDE SERPL-SCNC: 101 MMOL/L — SIGNIFICANT CHANGE UP (ref 96–108)
CO2 SERPL-SCNC: 26 MMOL/L — SIGNIFICANT CHANGE UP (ref 22–31)
CREAT SERPL-MCNC: 0.91 MG/DL — SIGNIFICANT CHANGE UP (ref 0.5–1.3)
EGFR: 94 ML/MIN/1.73M2 — SIGNIFICANT CHANGE UP
EGFR: 94 ML/MIN/1.73M2 — SIGNIFICANT CHANGE UP
GLUCOSE SERPL-MCNC: 87 MG/DL — SIGNIFICANT CHANGE UP (ref 70–99)
HCT VFR BLD CALC: 35.7 % — LOW (ref 39–50)
HGB BLD-MCNC: 12.9 G/DL — LOW (ref 13–17)
MAGNESIUM SERPL-MCNC: 2.1 MG/DL — SIGNIFICANT CHANGE UP (ref 1.6–2.6)
MCHC RBC-ENTMCNC: 30.9 PG — SIGNIFICANT CHANGE UP (ref 27–34)
MCHC RBC-ENTMCNC: 36.1 G/DL — HIGH (ref 32–36)
MCV RBC AUTO: 85.6 FL — SIGNIFICANT CHANGE UP (ref 80–100)
NRBC BLD AUTO-RTO: 0 /100 WBCS — SIGNIFICANT CHANGE UP (ref 0–0)
PHOSPHATE SERPL-MCNC: 4.2 MG/DL — SIGNIFICANT CHANGE UP (ref 2.5–4.5)
PLATELET # BLD AUTO: 281 K/UL — SIGNIFICANT CHANGE UP (ref 150–400)
POTASSIUM SERPL-MCNC: 4 MMOL/L — SIGNIFICANT CHANGE UP (ref 3.5–5.3)
POTASSIUM SERPL-SCNC: 4 MMOL/L — SIGNIFICANT CHANGE UP (ref 3.5–5.3)
PROT SERPL-MCNC: 6.5 G/DL — SIGNIFICANT CHANGE UP (ref 6–8.3)
RBC # BLD: 4.17 M/UL — LOW (ref 4.2–5.8)
RBC # FLD: 12.5 % — SIGNIFICANT CHANGE UP (ref 10.3–14.5)
SODIUM SERPL-SCNC: 132 MMOL/L — LOW (ref 135–145)
WBC # BLD: 8.83 K/UL — SIGNIFICANT CHANGE UP (ref 3.8–10.5)
WBC # FLD AUTO: 8.83 K/UL — SIGNIFICANT CHANGE UP (ref 3.8–10.5)

## 2025-06-23 PROCEDURE — 99232 SBSQ HOSP IP/OBS MODERATE 35: CPT

## 2025-06-23 RX ORDER — HYDROCORTISONE 20 MG
25 TABLET ORAL
Refills: 0 | Status: DISCONTINUED | OUTPATIENT
Start: 2025-06-24 | End: 2025-06-25

## 2025-06-23 RX ADMIN — Medication 50 MILLIGRAM(S): at 17:06

## 2025-06-23 RX ADMIN — Medication 2 TABLET(S): at 21:23

## 2025-06-23 RX ADMIN — Medication 1 APPLICATION(S): at 06:30

## 2025-06-23 RX ADMIN — ENOXAPARIN SODIUM 40 MILLIGRAM(S): 100 INJECTION SUBCUTANEOUS at 06:27

## 2025-06-23 RX ADMIN — POLYETHYLENE GLYCOL 3350 17 GRAM(S): 17 POWDER, FOR SOLUTION ORAL at 21:25

## 2025-06-23 RX ADMIN — Medication 50 MILLIGRAM(S): at 06:26

## 2025-06-23 NOTE — PROGRESS NOTE ADULT - SUBJECTIVE AND OBJECTIVE BOX
NP Note discussed with  Primary Attending    Patient is a 65y old  Male who presents with a chief complaint of severe hyponatremia, cortisol crisis (23 Jun 2025 15:56)      INTERVAL HPI/OVERNIGHT EVENTS: no acute events overnight    MEDICATIONS  (STANDING):  chlorhexidine 2% Cloths 1 Application(s) Topical <User Schedule>  enoxaparin Injectable 40 milliGRAM(s) SubCutaneous every 24 hours  hydrocortisone sodium succinate Injectable 50 milliGRAM(s) IV Push every 12 hours  polyethylene glycol 3350 17 Gram(s) Oral at bedtime  senna 2 Tablet(s) Oral at bedtime    MEDICATIONS  (PRN):  OLANZapine 2.5 milliGRAM(s) Oral once PRN agitation  ondansetron    Tablet 4 milliGRAM(s) Oral daily PRN Nausea and/or Vomiting      __________________________________________________  REVIEW OF SYSTEMS:    CONSTITUTIONAL: No fever,   EYES: no acute visual disturbances  NECK: No pain or stiffness  RESPIRATORY: No cough; No shortness of breath  CARDIOVASCULAR: No chest pain, no palpitations  GASTROINTESTINAL: No pain. No nausea or vomiting; No diarrhea   NEUROLOGICAL: No headache or numbness, no tremors  MUSCULOSKELETAL: No joint pain, no muscle pain  GENITOURINARY: no dysuria, no frequency, no hesitancy  PSYCHIATRY: no depression , no anxiety  ALL OTHER  ROS negative        Vital Signs Last 24 Hrs  T(C): 37.1 (23 Jun 2025 12:40), Max: 37.1 (23 Jun 2025 12:40)  T(F): 98.7 (23 Jun 2025 12:40), Max: 98.7 (23 Jun 2025 12:40)  HR: 95 (23 Jun 2025 12:40) (72 - 95)  BP: 114/71 (23 Jun 2025 12:40) (114/71 - 142/94)  BP(mean): 85 (23 Jun 2025 12:40) (85 - 95)  RR: 17 (23 Jun 2025 12:40) (17 - 18)  SpO2: 96% (23 Jun 2025 12:40) (96% - 98%)    Parameters below as of 23 Jun 2025 12:40  Patient On (Oxygen Delivery Method): room air        ________________________________________________  PHYSICAL EXAM:  GENERAL: NAD  HEENT: Normocephalic  NECK : supple  CHEST/LUNG: Clear to auscultation bilaterally  HEART: S1 S2  regular  ABDOMEN: Soft, Nontender, Nondistended; Bowel sounds present  EXTREMITIES: no edema  SKIN: warm and dry  NERVOUS SYSTEM:  Awake and alert; Oriented  to place, person and time    _________________________________________________  LABS:                        12.9   8.83  )-----------( 281      ( 23 Jun 2025 05:30 )             35.7     06-23    132[L]  |  101  |  15  ----------------------------<  87  4.0   |  26  |  0.91    Ca    9.1      23 Jun 2025 05:30  Phos  4.2     06-23  Mg     2.1     06-23    TPro  6.5  /  Alb  3.2[L]  /  TBili  0.4  /  DBili  x   /  AST  16  /  ALT  36  /  AlkPhos  56  06-23      Urinalysis Basic - ( 23 Jun 2025 05:30 )    Color: x / Appearance: x / SG: x / pH: x  Gluc: 87 mg/dL / Ketone: x  / Bili: x / Urobili: x   Blood: x / Protein: x / Nitrite: x   Leuk Esterase: x / RBC: x / WBC x   Sq Epi: x / Non Sq Epi: x / Bacteria: x      CAPILLARY BLOOD GLUCOSE            RADIOLOGY & ADDITIONAL TESTS:    < from: CT Chest w/ IV Cont (06.18.25 @ 14:22) >  IMPRESSION:  No bowel obstruction. Visualized appendix appears unremarkable. Collapse   mild hiatal hernia versus distal esophageal mural thickening. EGD may be   pursued for further evaluation. Possible focal mural thickening at the   cecum. Colonoscopy may be pursued to rule out colon cancer. Moderate to   large amount of stool in the rectum.    < end of copied text >    Imaging Personally Reviewed:  YES    Consultant(s) Notes Reviewed:   YES      Plan of care was discussed with patient and /or primary care giver; all questions and concerns were addressed and care was aligned with patient's wishes.

## 2025-06-23 NOTE — DISCHARGE NOTE PROVIDER - NSDCMRMEDTOKEN_GEN_ALL_CORE_FT
hydrocortisone 10 mg oral tablet: 1 tab(s) orally 2 times a day  losartan 100 mg oral tablet: 1 tab(s) orally once a day  Norvasc 10 mg oral tablet: 1 tab(s) orally once a day   cefuroxime 250 mg oral tablet: 1 tab(s) orally 2 times a day from 6/28  fluticasone 50 mcg/inh nasal spray: 1 spray(s) nasal 2 times a day  hydrocortisone 5 mg oral tablet: 5 tab(s) orally every 12 hours PO hydrocortisone 25mg q12hr x 5 days (from 6/26) then decrease to 15mg q12 hr for 5 days.   If sodium level &gt;130 and BP is ok in Rehab, then further decrease to Hydrocortisone PO 10mg in AM and 5mg in PM ([facility]IFELONG).  polyethylene glycol 3350 oral powder for reconstitution: 17 gram(s) orally once a day (at bedtime)  senna leaf extract oral tablet: 2 tab(s) orally once a day (at bedtime)  sodium chloride 1 g oral tablet: 1 tab(s) orally 3 times a day

## 2025-06-23 NOTE — PROGRESS NOTE ADULT - PROBLEM SELECTOR PLAN 2
takes hydrocortisone 10mg BID at home-follows with Dr. Syed outpatient  Endo recs for taper  rest of plan as above

## 2025-06-23 NOTE — DISCHARGE NOTE PROVIDER - HOSPITAL COURSE
65M PMH HTN, HLD, depression, adrenal insufficiency, and melanoma presenting with nausea and vomiting x 4 days. Takes hydrocortisone 10mg BID as per patient. Endorses no missed doses of hydrocortisone at home, no new medications or any abnormal food ingestion. Na was found to be 108. Admitted to ICU for severe hyponatremia. Urine lytes were ordered, hyponatremia likely 2/2 SIADH iso possible cortisol crisis.  Pt was initially on fluid restriction (800cc in diet), transitioned to 3% NS. Na corrected to 117, and was placed on fluid restriction again. Repeat sodium was 128 and d5% was started for 2 hours and fluid restriction was continued, resolving the severe hyponatremia. Nephrology was consulted, recommended that Na be trended BID for now. Endocrine was consulted for possible cortisol crisis. Pt was started on Hydrocortisone 50mg IV q6, and then tapered down to hydrocortisone 50mg IV q8 (day 2/2). Given patient's improved clinical status and current hemodynamic stability, decision was made to downgrade him to the medical floors.     Sodium level now 132.   Physical therapy cornel DIAZ.        65M PMH HTN, HLD, depression, and melanoma presenting with nausea and vomiting x 4 days. Admitted to ICU for severe hyponatremia, likely 2/2 SIADH iso possible cortisol crisis. Endo following. Resolving, plan DC to [facility] 6/24, but cancelled as  code sepsis was called 6/24.  ID following. Blood culture negative. Ucx grew Klebsiella, pending sensitivity. on Ceftriaxone.   Pt sodium level trended down 129, started NaCl tablet, now improving 134. Endo reccs to switch IV steroid to PO with taper.   plan for DC to [facility] once Ucx sensitivity is back with ID reccs.      65M PMH HTN, HLD, depression, adrenal insufficiency, and melanoma presenting with nausea and vomiting x 4 days. Takes hydrocortisone 10mg BID as per patient. Endorses no missed doses of hydrocortisone at home, no new medications or any abnormal food ingestion. Na was found to be 108.   Patient is admitted to ICU for severe hyponatremia. Urine lytes were ordered, hyponatremia likely 2/2 SIADH iso possible cortisol crisis. Nephrology consulted.   Pt was initially on fluid restriction (800cc in diet), transitioned to 3% NS. Na corrected to 117, and was placed on fluid restriction again. Repeat sodium was 128 and d5% was started for 2 hours and fluid restriction was continued, resolving the severe hyponatremia. Nephrology recommended that Na be trended BID for now. Endocrine was consulted for possible cortisol crisis. Pt was started on Hydrocortisone 50mg IV q6, and then tapered down to hydrocortisone 50mg IV q8 (day 2/2). Given patient's improved clinical status and current hemodynamic stability, decision was made to downgrade him to the medical floors.     Hospital course was complicated due to Sepsis (Fever, Tachycardia, hypotension) on 6/24. CXR No acute lung disease.  Blood culture no growth. UA positive with culture growing Klebsiella. ID consulted. Started Ceftriaxone. Culture sensitivity reviewed. ID reccs to continue Ceftin 250mg BID for Total 7 days course. Pt sodium level trended down to 129. Pt serum sodium improving with NaCl 1mg TID, increased by nephrology.  Patient IV steroid titrated to PO hydrocortisone per endocrine.   Physical therapy eval recommends sub acute rehab.    Patient is medically optimized for discharge to [facility].   Discharge plan discussed with attending and patient at bedside.          65M PMH HTN, HLD, depression, adrenal insufficiency, and melanoma presenting with nausea and vomiting x 4 days. Takes hydrocortisone 10mg BID as per patient. Endorses no missed doses of hydrocortisone at home, no new medications or any abnormal food ingestion. Na was found to be 108.   Patient is admitted to ICU for severe hyponatremia. Urine lytes were ordered, hyponatremia likely 2/2 SIADH iso possible cortisol crisis. Nephrology consulted.   Pt was initially on fluid restriction (800cc in diet), transitioned to 3% NS. Na corrected to 117, and was placed on fluid restriction again. Repeat sodium was 128 and d5% was started for 2 hours and fluid restriction was continued, resolving the severe hyponatremia. Nephrology recommended that Na be trended BID for now. Endocrine was consulted for possible cortisol crisis. Pt was started on Hydrocortisone 50mg IV q6, and then tapered down to hydrocortisone 50mg IV q8 (day 2/2). Given patient's improved clinical status and current hemodynamic stability, decision was made to downgrade him to the medical floors.     Hospital course was complicated due to Sepsis (Fever, Tachycardia, hypotension) on 6/24. CXR No acute lung disease.  Blood culture no growth. UA positive with culture growing Klebsiella. ID consulted. Started Ceftriaxone. Culture sensitivity reviewed. ID reccs to continue Ceftin 250mg BID for Total 7 days course. Pt sodium level trended down to 129. Pt serum sodium improving with NaCl 1mg TID, increased by nephrology.  Patient s/p stress dose IV steroid during sepsis w/u and then titrated to PO hydrocortisone per endocrine- continue PO hydrocortisone 25mg q12hr x 5 days then 15mg q12 hr for 5 days.   If sodium level >130 and BP is ok in Rehab, then further decrease to Hydrocortisone PO 10mg in AM and 5mg in PM (LIFELONG).Serum sodium stable around 135.     Physical therapy eval recommends sub acute rehab.    Patient is medically optimized for discharge to [facility] with outpatient follow up.   Discharge plan discussed with attending and patient at bedside.

## 2025-06-23 NOTE — PROGRESS NOTE ADULT - ASSESSMENT
65M PMH HTN, HLD, depression, and melanoma presenting with nausea and vomiting x 4 days. Admitted to ICU for severe hyponatremia, likely 2/2 SIADH iso possible cortisol crisis. Endo following

## 2025-06-23 NOTE — PROGRESS NOTE ADULT - SUBJECTIVE AND OBJECTIVE BOX
NEPHROLOGY MEDICAL CARE, [facility] - Dr. Dequan Suresh/ Dr. Mary Lance/ Dr. Jean Paul Smalls/ Dr. Shawn Alvarez    Date of Service: 06-23-25    Patient was seen and examined at bedside.    CC: patient is okay and NAD    Vital Signs Last 24 Hrs  T(C): 37.1 (23 Jun 2025 12:40), Max: 37.1 (23 Jun 2025 12:40)  T(F): 98.7 (23 Jun 2025 12:40), Max: 98.7 (23 Jun 2025 12:40)  HR: 95 (23 Jun 2025 12:40) (72 - 95)  BP: 114/71 (23 Jun 2025 12:40) (114/71 - 142/94)  BP(mean): 85 (23 Jun 2025 12:40) (85 - 95)  RR: 17 (23 Jun 2025 12:40) (17 - 18)  SpO2: 96% (23 Jun 2025 12:40) (96% - 98%)    Parameters below as of 23 Jun 2025 12:40  Patient On (Oxygen Delivery Method): room air          PHYSICAL EXAM:  General: No acute respiratory distress.  Eyes: conjunctiva and sclera clear  ENMT: Atraumatic, Normocephalic, supple, No JVD present. Moist mucous membranes  Respiratory: Bilateral clear lungs; No rales, rhonchi, wheezing  Cardiovascular: S1S2+; no m/r/g  Gastrointestinal: Soft, Non-tender, Nondistended; Bowel sounds present,   Neuro:  Awake, Alert & Oriented X3  Ext:   No edema, No Cyanosis  Skin: No visible rashes    MEDICATIONS:  MEDICATIONS  (STANDING):  chlorhexidine 2% Cloths 1 Application(s) Topical <User Schedule>  enoxaparin Injectable 40 milliGRAM(s) SubCutaneous every 24 hours  hydrocortisone sodium succinate Injectable 50 milliGRAM(s) IV Push every 12 hours  polyethylene glycol 3350 17 Gram(s) Oral at bedtime  senna 2 Tablet(s) Oral at bedtime    MEDICATIONS  (PRN):  OLANZapine 2.5 milliGRAM(s) Oral once PRN agitation  ondansetron    Tablet 4 milliGRAM(s) Oral daily PRN Nausea and/or Vomiting          LABS:                        12.9   8.83  )-----------( 281      ( 23 Jun 2025 05:30 )             35.7     06-23    132[L]  |  101  |  15  ----------------------------<  87  4.0   |  26  |  0.91    Ca    9.1      23 Jun 2025 05:30  Phos  4.2     06-23  Mg     2.1     06-23    TPro  6.5  /  Alb  3.2[L]  /  TBili  0.4  /  DBili  x   /  AST  16  /  ALT  36  /  AlkPhos  56  06-23      Urinalysis Basic - ( 23 Jun 2025 05:30 )    Color: x / Appearance: x / SG: x / pH: x  Gluc: 87 mg/dL / Ketone: x  / Bili: x / Urobili: x   Blood: x / Protein: x / Nitrite: x   Leuk Esterase: x / RBC: x / WBC x   Sq Epi: x / Non Sq Epi: x / Bacteria: x      Magnesium: 2.1 mg/dL (06-23 @ 05:30)  Phosphorus: 4.2 mg/dL (06-23 @ 05:30)    Urine studies    PTH and Vit D:

## 2025-06-23 NOTE — DISCHARGE NOTE PROVIDER - CARE PROVIDER_API CALL
Yo Nowak  Family Medicine  [address], [address]  [place] [address]  Phone: [phone]  Fax: [phone]  Follow Up Time: 1 week    Dequan Suresh  Nephrology  [address] [place] [address]  Phone: [phone]  Fax: [phone]  Follow Up Time: 1 week   Yo Nowak  Family Medicine  [address] [facility], [address]  [place] [address]  Phone: [phone]  Fax: [phone]  Follow Up Time: Routine    Dequan Suresh  Nephrology  [address] [place] [id number]  Phone: [phone]  Fax: [phone]  Follow Up Time: 1 week    [facility],   Phone: (   )    -  Fax: (   )    -  Follow Up Time: 1-3 days

## 2025-06-23 NOTE — PROGRESS NOTE ADULT - PROBLEM SELECTOR PLAN 3
takes losartan 100mg qd and amlodipine 10mg qd at home  will hold home meds at this time   /80  monitor BP, may resume HTN meds if BP is elevated

## 2025-06-23 NOTE — PHARMACOTHERAPY INTERVENTION NOTE - COMMENTS
Provided patient counseling on pt's new medications and side effects of those medications. Also provided pt with printed information. Briefly went over inpatient medications as well. Answered pt's questions.

## 2025-06-23 NOTE — PROGRESS NOTE ADULT - ASSESSMENT
1. Hyponatremia with unknown duration most likely chronic. Pt is clinically euvolemic. Multifactorial from High ADH state and Adrenal insuff.  -Na improved to 132; continue to observe.   -low uric acid and f/u cortisol in am.   -continue Fluid restriction 800ml to 1L/day.   -Check Seurm Na daily. Monitor I/O's daily. Avoid overcorrection of NA (8-10meq/day)  2. HTN:   -bp is low   -hold bp meds  -monitor bp.  3. h/o Adrenal inuff:  -on iv hydrocortisone  -Endo consulted.   4. Hypophosphatemia:  -phos improved.   -monitor phos

## 2025-06-23 NOTE — PROGRESS NOTE ADULT - PROBLEM SELECTOR PLAN 1
presenting with generalized weakness, nausea, vomiting   hyponatremia likely SIADH 2/2 possible cortisol crisis  takes hydrocortisone 10mg BID as per patient  c/w hydrocort taper as per endo recs  Taper to 25q12 for 2 days starting tomorrow, then  Taper to 15q12 for 2 days, then  Taper to 10q12 for 2 days and continue lifelong  Uric acid 3.2  - f/u free cortisol level  - Endo consulted: Dr Syed  - Nephro consulted: Dr Suresh

## 2025-06-23 NOTE — PROGRESS NOTE ADULT - SUBJECTIVE AND OBJECTIVE BOX
Subjective: No acute events overnight, Offers no complaints. States he is feeling much better.  Chart Notes, Work list Manager, and fingersticks reviewed.    Review of Systems:  Constitutional: No fever  Eyes: No blurry vision  Neuro: No tremors, weakness or numbness  HEENT: No pain  Cardiovascular: No chest pain, palpitations  Respiratory: No SOB, no cough  GI: No nausea, vomiting, abdominal pain  : No dysuria  Skin: no rash  Psych: no depression  Endocrine: no polyuria, polydipsia  Hem/lymph: no swelling        Medications (Standing):  chlorhexidine 2% Cloths 1 Application(s) Topical <User Schedule>  enoxaparin Injectable 40 milliGRAM(s) SubCutaneous every 24 hours  hydrocortisone sodium succinate Injectable 50 milliGRAM(s) IV Push every 12 hours  polyethylene glycol 3350 17 Gram(s) Oral at bedtime  senna 2 Tablet(s) Oral at bedtime    Medications (prn):  OLANZapine 2.5 milliGRAM(s) Oral once PRN agitation  ondansetron    Tablet 4 milliGRAM(s) Oral daily PRN Nausea and/or Vomiting      Physical Exam:  VITALS: T(C): 36.9 (06-23-25 @ 05:00)  T(F): 98.4 (06-23-25 @ 05:00), Max: 98.4 (06-23-25 @ 05:00)  HR: 72 (06-23-25 @ 05:00) (72 - 87)  BP: 124/81 (06-23-25 @ 05:00) (124/81 - 154/88)  RR:  (18 - 18)  SpO2:  (96% - 98%)  Wt(kg): --  GENERAL: NAD,   HEENT:  Atraumatic, Normocephalic, drymucous membranes  THYROID: Normal size, no palpable nodules  RESPIRATORY: Clear to auscultation bilaterally; No rales, rhonchi, wheezing  CARDIOVASCULAR: Regular rate and rhythm; No murmurs; no peripheral edema  GI: Soft, nontender, non distended, +ve abdominal obesity  EXTREMITIES: +ve peripheral pulses, -ve pedal edema  SKIN: Dry, intact, No rashes or lesions  PSYCH: Alert and oriented x 3      Capillary blood glucose        06-23    132[L]  |  101  |  15  ----------------------------<  87  4.0   |  26  |  0.91    eGFR: 94    Ca    9.1      06-23  Mg     2.1     06-23  Phos  4.2     06-23    TPro  6.5  /  Alb  3.2[L]  /  TBili  0.4  /  DBili  x   /  AST  16  /  ALT  36  /  AlkPhos  56  06-23    Thyroid Function Tests:  06-20 @ 03:53 TSH 1.88 FreeT4 1.6 T3 -- Anti TPO -- Anti Thyroglobulin Ab -- TSI --        Assessment and Plan:    65y male with h/o Chronic secondary adrenal insufficiency, Subclinical Hypothyroidism, HTN, HLD, Depression (not on any meds), Melanoma, presented with 4-5 days of NBNB vomiting. Found to have Na 108. Admitted to ICU for severe hyponatremia. Endocrinology consulted for Adrenal crisis.    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  Taper to 50q8 for 2 days, then  Taper to 50q12 for 2 days, then  Taper to 25q12 for 2 days, then  Taper to 15q12 for 2 days, then  Taper to 10q12 for 2 days  then continue hydrocortisone 10 mg q 12 hour lifelong  -Change to PO Hydrocortisone when patient's Na is wnl and BP remain stable   Subjective: No acute events overnight, Offers no complaints. States he is feeling much better.  Chart Notes, Work list Manager, and fingersticks reviewed.    Review of Systems:  Constitutional: No fever  Eyes: No blurry vision  Neuro: No tremors, weakness or numbness  HEENT: No pain  Cardiovascular: No chest pain, palpitations  Respiratory: No SOB, no cough  GI: No nausea, vomiting, abdominal pain  : No dysuria  Skin: no rash  Psych: no depression  Endocrine: no polyuria, polydipsia  Hem/lymph: no swelling        Medications (Standing):  chlorhexidine 2% Cloths 1 Application(s) Topical <User Schedule>  enoxaparin Injectable 40 milliGRAM(s) SubCutaneous every 24 hours  hydrocortisone sodium succinate Injectable 50 milliGRAM(s) IV Push every 12 hours  polyethylene glycol 3350 17 Gram(s) Oral at bedtime  senna 2 Tablet(s) Oral at bedtime    Medications (prn):  OLANZapine 2.5 milliGRAM(s) Oral once PRN agitation  ondansetron    Tablet 4 milliGRAM(s) Oral daily PRN Nausea and/or Vomiting      Physical Exam:  VITALS: T(C): 36.9 (06-23-25 @ 05:00)  T(F): 98.4 (06-23-25 @ 05:00), Max: 98.4 (06-23-25 @ 05:00)  HR: 72 (06-23-25 @ 05:00) (72 - 87)  BP: 124/81 (06-23-25 @ 05:00) (124/81 - 154/88)  RR:  (18 - 18)  SpO2:  (96% - 98%)  Wt(kg): --  GENERAL: NAD,   HEENT:  Atraumatic, Normocephalic, drymucous membranes  THYROID: Normal size, no palpable nodules  RESPIRATORY: Clear to auscultation bilaterally; No rales, rhonchi, wheezing  CARDIOVASCULAR: Regular rate and rhythm; No murmurs; no peripheral edema  GI: Soft, nontender, non distended, +ve abdominal obesity  EXTREMITIES: +ve peripheral pulses, -ve pedal edema  SKIN: Dry, intact, No rashes or lesions  PSYCH: Alert and oriented x 3      Capillary blood glucose        06-23    132[L]  |  101  |  15  ----------------------------<  87  4.0   |  26  |  0.91    eGFR: 94    Ca    9.1      06-23  Mg     2.1     06-23  Phos  4.2     06-23    TPro  6.5  /  Alb  3.2[L]  /  TBili  0.4  /  DBili  x   /  AST  16  /  ALT  36  /  AlkPhos  56  06-23    Thyroid Function Tests:  06-20 @ 03:53 TSH 1.88 FreeT4 1.6 T3 -- Anti TPO -- Anti Thyroglobulin Ab -- TSI --        Assessment and Plan:    65y male with h/o Chronic secondary adrenal insufficiency, Subclinical Hypothyroidism, HTN, HLD, Depression (not on any meds), Melanoma, presented with 4-5 days of NBNB vomiting. Found to have Na 108. Admitted to ICU for severe hyponatremia. Endocrinology consulted for Adrenal crisis.    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  -Continue with Hydrocortisone 50q once today, then    Taper to 25q12 for 2 days starting tomorrow, then  Taper to 15q12 for 2 days, then  Taper to 10q12 for 2 days  then continue hydrocortisone 10 mg q 12 hour lifelong  -Change to PO Hydrocortisone when patient's Na is wnl and BP remain stable   Subjective: No acute events overnight, Offers no complaints. States he is feeling much better.  Chart Notes, Work list Manager, and fingersticks reviewed.    Review of Systems:  Constitutional: No fever  Eyes: No blurry vision  Neuro: No tremors, weakness or numbness  HEENT: No pain  Cardiovascular: No chest pain, palpitations  Respiratory: No SOB, no cough  GI: No nausea, vomiting, abdominal pain  : No dysuria  Skin: no rash  Psych: no depression  Endocrine: no polyuria, polydipsia  Hem/lymph: no swelling        Medications (Standing):  chlorhexidine 2% Cloths 1 Application(s) Topical <User Schedule>  enoxaparin Injectable 40 milliGRAM(s) SubCutaneous every 24 hours  hydrocortisone sodium succinate Injectable 50 milliGRAM(s) IV Push every 12 hours  polyethylene glycol 3350 17 Gram(s) Oral at bedtime  senna 2 Tablet(s) Oral at bedtime    Medications (prn):  OLANZapine 2.5 milliGRAM(s) Oral once PRN agitation  ondansetron    Tablet 4 milliGRAM(s) Oral daily PRN Nausea and/or Vomiting      Physical Exam:  VITALS: T(C): 36.9 (06-23-25 @ 05:00)  T(F): 98.4 (06-23-25 @ 05:00), Max: 98.4 (06-23-25 @ 05:00)  HR: 72 (06-23-25 @ 05:00) (72 - 87)  BP: 124/81 (06-23-25 @ 05:00) (124/81 - 154/88)  RR:  (18 - 18)  SpO2:  (96% - 98%)  Wt(kg): --  GENERAL: NAD,   HEENT:  Atraumatic, Normocephalic, drymucous membranes  THYROID: Normal size, no palpable nodules  RESPIRATORY: Clear to auscultation bilaterally; No rales, rhonchi, wheezing  CARDIOVASCULAR: Regular rate and rhythm; No murmurs; no peripheral edema  GI: Soft, nontender, non distended, +ve abdominal obesity  EXTREMITIES: +ve peripheral pulses, -ve pedal edema  SKIN: Dry, intact, No rashes or lesions  PSYCH: Alert and oriented x 3      Capillary blood glucose        06-23    132[L]  |  101  |  15  ----------------------------<  87  4.0   |  26  |  0.91    eGFR: 94    Ca    9.1      06-23  Mg     2.1     06-23  Phos  4.2     06-23    TPro  6.5  /  Alb  3.2[L]  /  TBili  0.4  /  DBili  x   /  AST  16  /  ALT  36  /  AlkPhos  56  06-23    Thyroid Function Tests:  06-20 @ 03:53 TSH 1.88 FreeT4 1.6 T3 -- Anti TPO -- Anti Thyroglobulin Ab -- TSI --        Assessment and Plan:    65y male with h/o Chronic secondary adrenal insufficiency, Subclinical Hypothyroidism, HTN, HLD, Depression (not on any meds), Melanoma, presented with 4-5 days of NBNB vomiting. Found to have Na 108. Admitted to ICU for severe hyponatremia. Endocrinology consulted for Adrenal crisis.    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  -Continue with Hydrocortisone 50q once today, then    Taper to 25q12 for 2 days starting tomorrow, then  Taper to 15q12 for 2 days, then  Taper to 10q12 for 2 days and continue lifelong       Subjective: No acute events overnight, Offers no complaints. States he is feeling much better.  Chart Notes, Work list Manager, and vital signs reviewed.    Review of Systems:  Constitutional: No fever  Cardiovascular: No chest pain, palpitations  Respiratory: No SOB, no cough  GI: No nausea, vomiting, abdominal pain  Hem/lymph: no swelling    Medications (Standing):  chlorhexidine 2% Cloths 1 Application(s) Topical <User Schedule>  enoxaparin Injectable 40 milliGRAM(s) SubCutaneous every 24 hours  hydrocortisone sodium succinate Injectable 50 milliGRAM(s) IV Push every 12 hours  polyethylene glycol 3350 17 Gram(s) Oral at bedtime  senna 2 Tablet(s) Oral at bedtime    Medications (prn):  OLANZapine 2.5 milliGRAM(s) Oral once PRN agitation  ondansetron    Tablet 4 milliGRAM(s) Oral daily PRN Nausea and/or Vomiting      Physical Exam:  VITALS: T(C): 36.9 (06-23-25 @ 05:00)  T(F): 98.4 (06-23-25 @ 05:00), Max: 98.4 (06-23-25 @ 05:00)  HR: 72 (06-23-25 @ 05:00) (72 - 87)  BP: 124/81 (06-23-25 @ 05:00) (124/81 - 154/88)  RR:  (18 - 18)  SpO2:  (96% - 98%)    GENERAL: NAD,   HEENT:  Atraumatic, Normocephalic, drymucous membranes  THYROID: Normal size, no palpable nodules  RESPIRATORY: Clear to auscultation bilaterally; No rales, rhonchi, wheezing  CARDIOVASCULAR: Regular rate and rhythm; No murmurs; no peripheral edema  GI: Soft, nontender, non distended, +ve abdominal obesity  EXTREMITIES: +ve peripheral pulses, -ve pedal edema  SKIN: Dry, intact, No rashes or lesions  PSYCH: Alert and oriented x 3    Labs:  06-23    132[L]  |  101  |  15  ----------------------------<  87  4.0   |  26  |  0.91    eGFR: 94    Ca    9.1      06-23  Mg     2.1     06-23  Phos  4.2     06-23    TPro  6.5  /  Alb  3.2[L]  /  TBili  0.4  /  DBili  x   /  AST  16  /  ALT  36  /  AlkPhos  56  06-23      Assessment and Plan:    65y male with h/o Chronic secondary adrenal insufficiency, Subclinical Hypothyroidism, HTN, HLD, Depression (not on any meds), Melanoma, presented with 4-5 days of NBNB vomiting. Found to have Na 108. Admitted to ICU for severe hyponatremia. Endocrinology consulted for Adrenal crisis.    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  -Continue with Hydrocortisone 50q once today, then    Taper to 25q12 for 2 days starting tomorrow, then  Taper to 15q12 for 2 days, then  Taper to 10q12 for 2 days and continue lifelong

## 2025-06-23 NOTE — PROGRESS NOTE ADULT - SUBJECTIVE AND OBJECTIVE BOX
Patient is a 65y old  Male who presents with a chief complaint of severe hyponatremia, cortisol crisis (2025 17:33)    PATIENT IS SEEN AND EXAMINED IN MEDICAL FLOOR.      ALLERGIES:  No Known Allergies      Daily     Daily Weight in k.7 (2025 06:00)    VITALS:    Vital Signs Last 24 Hrs  T(C): 36.9 (2025 05:00), Max: 36.9 (2025 05:00)  T(F): 98.4 (2025 05:00), Max: 98.4 (2025 05:00)  HR: 72 (2025 05:00) (72 - 87)  BP: 124/81 (2025 05:00) (124/81 - 154/88)  BP(mean): 95 (2025 05:00) (95 - 95)  RR: 18 (2025 05:00) (18 - 18)  SpO2: 96% (2025 05:00) (96% - 98%)    Parameters below as of 2025 05:00  Patient On (Oxygen Delivery Method): room air        LABS:    CBC Full  -  ( 2025 05:30 )  WBC Count : 8.83 K/uL  RBC Count : 4.17 M/uL  Hemoglobin : 12.9 g/dL  Hematocrit : 35.7 %  Platelet Count - Automated : 281 K/uL  Mean Cell Volume : 85.6 fl  Mean Cell Hemoglobin : 30.9 pg  Mean Cell Hemoglobin Concentration : 36.1 g/dL  Auto Neutrophil # : x  Auto Lymphocyte # : x  Auto Monocyte # : x  Auto Eosinophil # : x  Auto Basophil # : x  Auto Neutrophil % : x  Auto Lymphocyte % : x  Auto Monocyte % : x  Auto Eosinophil % : x  Auto Basophil % : x      -    132[L]  |  101  |  15  ----------------------------<  87  4.0   |  26  |  0.91    Ca    9.1      2025 05:30  Phos  4.2     06-  Mg     2.1     -23    TPro  6.5  /  Alb  3.2[L]  /  TBili  0.4  /  DBili  x   /  AST  16  /  ALT  36  /  AlkPhos  56  -23    CAPILLARY BLOOD GLUCOSE            LIVER FUNCTIONS - ( 2025 05:30 )  Alb: 3.2 g/dL / Pro: 6.5 g/dL / ALK PHOS: 56 U/L / ALT: 36 U/L DA / AST: 16 U/L / GGT: x           Creatinine Trend: 0.91<--, 0.87<--, 1.27<--, 0.90<--, 1.02<--, 1.10<--  I&O's Summary              MEDICATIONS:    MEDICATIONS  (STANDING):  chlorhexidine 2% Cloths 1 Application(s) Topical <User Schedule>  enoxaparin Injectable 40 milliGRAM(s) SubCutaneous every 24 hours  hydrocortisone sodium succinate Injectable 50 milliGRAM(s) IV Push every 12 hours  polyethylene glycol 3350 17 Gram(s) Oral at bedtime  senna 2 Tablet(s) Oral at bedtime      MEDICATIONS  (PRN):  OLANZapine 2.5 milliGRAM(s) Oral once PRN agitation  ondansetron    Tablet 4 milliGRAM(s) Oral daily PRN Nausea and/or Vomiting      REVIEW OF SYSTEMS:                           ALL ROS DONE [ X   ]    CONSTITUTIONAL:  LETHARGIC [   ], FEVER [   ], UNRESPONSIVE [   ]  CVS:  CP  [   ], SOB, [   ], PALPITATIONS [   ], DIZZYNESS [   ]  RS: COUGH [   ], SPUTUM [   ]  GI: ABDOMINAL PAIN [   ], NAUSEA [   ], VOMITINGS [   ], DIARRHEA [   ], CONSTIPATION [   ]  :  DYSURIA [   ], NOCTURIA [   ], INCREASED FREQUENCY [   ], DRIBLING [   ],  SKELETAL: PAINFUL JOINTS [   ], SWOLLEN JOINTS [   ], NECK ACHE [   ], LOW BACK ACHE [   ],  SKIN : ULCERS [   ], RASH [   ], ITCHING [   ]  CNS: HEAD ACHE [   ], DOUBLE VISION [   ], BLURRED VISION [   ], AMS / CONFUSION [   ], SEIZURES [   ], WEAKNESS [   ],TINGLING / NUMBNESS [   ]        PHYSICAL EXAMINATION:    GENERAL APPEARANCE: NO DISTRESS  HEENT:  NO PALLOR, NO  JVD,  NO   NODES, NECK SUPPLE  CVS: S1 +, S2 +,   RS: AEEB,  OCCASIONAL  RALES +,   NO RONCHI  ABD: SOFT, NT, NO, BS +  EXT: NO PE  SKIN: WARM,   SKELETAL:  ROM REDUCED AT CERVICAL & LS SPINE  CNS:  AAO X 3 ,   POOR POWER IN ALL LIMBS        RADIOLOGY :    < from: CT Chest w/ IV Cont (25 @ 14:22) >  IMPRESSION:  No bowel obstruction. Visualized appendix appears unremarkable. Collapse   mild hiatal hernia versus distal esophageal mural thickening. EGD may be   pursued for further evaluation. Possible focal mural thickening at the   cecum. Colonoscopy may be pursued to rule out colon cancer. Moderate to   large amount of stool in the rectum.    < end of copied text >  < from: CT Head No Cont (25 @ 14:17) >  IMPRESSION:    No acute intracranial  hemorrhage, edema or mass effect.    This study cannot exclude the possibility of a isodense mass or possible   metastatic disease. A follow-up MRI or CT head with contrast would be   necessary for improved sensitivity.    < end of copied text >  < from: CT Maxillofacial w/ IV Cont (23 @ 13:20) >  IMPRESSION:  No evidence of recurrent tumor.    Status post interval left mandibular molar extraction.    Otherwise stable exam.    < end of copied text >        ASSESSMENT :     hyponatremia   Chronic adrenal insufficiency  Hypertension, HLD  Melanoma   - DEPRESSION, ANXIETY NEUROSIS/PANIC ATTACKS        PLAN:  HPI:  65M PMH HTN, HLD, depression, and melanoma presenting with nausea and vomiting x 4 days. He states that he had URI symptoms one week ago and since then has had decreased appetite. He reported that he had nausea and vomiting since Saturday, where he had 3-5 episodes of NBNB vomiting. He has had poor po intake since Saturday due to being nervous about feeling nauseous after, but has been drinking two to three 8 oz cups of water each day. He denies any dizziness, lightheadedness, blurry vision, or abdominal pain. He does report that he noticed his speech was slower than normal and that he is more lethargic. He denies missing any doses of his medications, including his hydrocortisone 10mg BID. He denies fevers, chills, CP, SOB, diarrhea, dysuria, numbness/tingling/weakness in extremities. He lives at home alone and ambulates independently.       # DC PLAN - PT EVALUATION AND PLACEMENT IN [facility] OR HOME WITH HOME PT  - DC OLANZEPINE  - CHANGE ORAL HYDROCORTISONE TO ORAL DOSE PENDING ENDOCRINOLOGY/NEPHROLOGY RECOMMENDATION      # RESOLVED NAUSEA, VOMITING - ON ZOFRAN    # RESOLVED ACUTE URTI AND RESOLVING HEADACHES - SYMPTOMATIC PAIN Rx AS NEEDED  - MAXILLARY SINUSITIS    # SEVERE HYPONATREMIA - S/P ICU MONITORING, IV. NaCL 3%, IV. HYDROCORTISONE  # LIKELY SIADH - ON FLUID RESTRICTION 800 ML/DAY  # RESOLVING HYPOPHOSPHATEMIA    # CONSTIPATION - BOWEL REGIMEN AS NEEDED    # IMPAIRED GAIT DUE TO GENERALIZED MUSCLE WEAKNESS, CERVICAL & LS SPONDYLOPATHY, POLYARTHRITIS AND PERIPHERAL NEUROPATHY  # OP    # ADULT FAILURE TO THRIVE, SEVERE PROTEIN CALORIE MALNUTRITION - HIGH RISK FOR WORSENING MALNUTRITION     # HTN, HLD - ANTI HTN Rx ON HOLD DUE TO HYPOTENSION    # H/O ADRENAL INSUFFICIENCY - ON IV. HYDROCORTISONE 50 MG Q 12 HRS    # DEPRESSION, ANXIETY NEUROSIS - NOT ON ANY Rx AT HOME - GETS PSYCHOTHERAPY ALONE    # H/O MELANOMA    # GI AND DVT PROPHYLAXIS   Patient is a 65y old  Male who presents with a chief complaint of severe hyponatremia, cortisol crisis (2025 17:33)    PATIENT IS SEEN AND EXAMINED IN MEDICAL FLOOR.      ALLERGIES:  No Known Allergies      Daily     Daily Weight in k.7 (2025 06:00)    VITALS:    Vital Signs Last 24 Hrs  T(C): 36.9 (2025 05:00), Max: 36.9 (2025 05:00)  T(F): 98.4 (2025 05:00), Max: 98.4 (2025 05:00)  HR: 72 (2025 05:00) (72 - 87)  BP: 124/81 (2025 05:00) (124/81 - 154/88)  BP(mean): 95 (2025 05:00) (95 - 95)  RR: 18 (2025 05:00) (18 - 18)  SpO2: 96% (2025 05:00) (96% - 98%)    Parameters below as of 2025 05:00  Patient On (Oxygen Delivery Method): room air        LABS:    CBC Full  -  ( 2025 05:30 )  WBC Count : 8.83 K/uL  RBC Count : 4.17 M/uL  Hemoglobin : 12.9 g/dL  Hematocrit : 35.7 %  Platelet Count - Automated : 281 K/uL  Mean Cell Volume : 85.6 fl  Mean Cell Hemoglobin : 30.9 pg  Mean Cell Hemoglobin Concentration : 36.1 g/dL  Auto Neutrophil # : x  Auto Lymphocyte # : x  Auto Monocyte # : x  Auto Eosinophil # : x  Auto Basophil # : x  Auto Neutrophil % : x  Auto Lymphocyte % : x  Auto Monocyte % : x  Auto Eosinophil % : x  Auto Basophil % : x      -    132[L]  |  101  |  15  ----------------------------<  87  4.0   |  26  |  0.91    Ca    9.1      2025 05:30  Phos  4.2     06-  Mg     2.1     -23    TPro  6.5  /  Alb  3.2[L]  /  TBili  0.4  /  DBili  x   /  AST  16  /  ALT  36  /  AlkPhos  56  -23    CAPILLARY BLOOD GLUCOSE            LIVER FUNCTIONS - ( 2025 05:30 )  Alb: 3.2 g/dL / Pro: 6.5 g/dL / ALK PHOS: 56 U/L / ALT: 36 U/L DA / AST: 16 U/L / GGT: x           Creatinine Trend: 0.91<--, 0.87<--, 1.27<--, 0.90<--, 1.02<--, 1.10<--  I&O's Summary              MEDICATIONS:    MEDICATIONS  (STANDING):  chlorhexidine 2% Cloths 1 Application(s) Topical <User Schedule>  enoxaparin Injectable 40 milliGRAM(s) SubCutaneous every 24 hours  hydrocortisone sodium succinate Injectable 50 milliGRAM(s) IV Push every 12 hours  polyethylene glycol 3350 17 Gram(s) Oral at bedtime  senna 2 Tablet(s) Oral at bedtime      MEDICATIONS  (PRN):  OLANZapine 2.5 milliGRAM(s) Oral once PRN agitation  ondansetron    Tablet 4 milliGRAM(s) Oral daily PRN Nausea and/or Vomiting      REVIEW OF SYSTEMS:                           ALL ROS DONE [ X   ]    CONSTITUTIONAL:  LETHARGIC [   ], FEVER [   ], UNRESPONSIVE [   ]  CVS:  CP  [   ], SOB, [   ], PALPITATIONS [   ], DIZZYNESS [   ]  RS: COUGH [   ], SPUTUM [   ]  GI: ABDOMINAL PAIN [   ], NAUSEA [   ], VOMITINGS [   ], DIARRHEA [   ], CONSTIPATION [   ]  :  DYSURIA [   ], NOCTURIA [   ], INCREASED FREQUENCY [   ], DRIBLING [   ],  SKELETAL: PAINFUL JOINTS [   ], SWOLLEN JOINTS [   ], NECK ACHE [   ], LOW BACK ACHE [   ],  SKIN : ULCERS [   ], RASH [   ], ITCHING [   ]  CNS: HEAD ACHE [   ], DOUBLE VISION [   ], BLURRED VISION [   ], AMS / CONFUSION [   ], SEIZURES [   ], WEAKNESS [   ],TINGLING / NUMBNESS [   ]        PHYSICAL EXAMINATION:    GENERAL APPEARANCE: NO DISTRESS  HEENT:  NO PALLOR, NO  JVD,  NO   NODES, NECK SUPPLE  CVS: S1 +, S2 +,   RS: AEEB,  OCCASIONAL  RALES +,   NO RONCHI  ABD: SOFT, NT, NO, BS +  EXT: NO PE  SKIN: WARM,   SKELETAL:  ROM REDUCED AT CERVICAL & LS SPINE  CNS:  AAO X 3 ,   POOR POWER IN ALL LIMBS        RADIOLOGY :    < from: CT Chest w/ IV Cont (25 @ 14:22) >  IMPRESSION:  No bowel obstruction. Visualized appendix appears unremarkable. Collapse   mild hiatal hernia versus distal esophageal mural thickening. EGD may be   pursued for further evaluation. Possible focal mural thickening at the   cecum. Colonoscopy may be pursued to rule out colon cancer. Moderate to   large amount of stool in the rectum.    < end of copied text >  < from: CT Head No Cont (25 @ 14:17) >  IMPRESSION:    No acute intracranial  hemorrhage, edema or mass effect.    This study cannot exclude the possibility of a isodense mass or possible   metastatic disease. A follow-up MRI or CT head with contrast would be   necessary for improved sensitivity.    < end of copied text >  < from: CT Maxillofacial w/ IV Cont (23 @ 13:20) >  IMPRESSION:  No evidence of recurrent tumor.    Status post interval left mandibular molar extraction.    Otherwise stable exam.    < end of copied text >        ASSESSMENT :     hyponatremia   Chronic adrenal insufficiency  Hypertension, HLD  Melanoma   - DEPRESSION, ANXIETY NEUROSIS/PANIC ATTACKS        PLAN:  HPI:  65M PMH HTN, HLD, depression, and melanoma presenting with nausea and vomiting x 4 days. He states that he had URI symptoms one week ago and since then has had decreased appetite. He reported that he had nausea and vomiting since Saturday, where he had 3-5 episodes of NBNB vomiting. He has had poor po intake since Saturday due to being nervous about feeling nauseous after, but has been drinking two to three 8 oz cups of water each day. He denies any dizziness, lightheadedness, blurry vision, or abdominal pain. He does report that he noticed his speech was slower than normal and that he is more lethargic. He denies missing any doses of his medications, including his hydrocortisone 10mg BID. He denies fevers, chills, CP, SOB, diarrhea, dysuria, numbness/tingling/weakness in extremities. He lives at home alone and ambulates independently.       # DC PLAN - PT EVALUATION AND PLACEMENT IN [facility]   - CHANGE ORAL HYDROCORTISONE TO ORAL DOSE PENDING ENDOCRINOLOGY/NEPHROLOGY RECOMMENDATION      # RESOLVED NAUSEA, VOMITING - ON ZOFRAN    # RESOLVED ACUTE URTI AND RESOLVING HEADACHES - SYMPTOMATIC PAIN Rx AS NEEDED  - MAXILLARY SINUSITIS    # SEVERE HYPONATREMIA - S/P ICU MONITORING, IV. NaCL 3%, IV. HYDROCORTISONE  # LIKELY SIADH - ON FLUID RESTRICTION 800 ML/DAY  # RESOLVING HYPOPHOSPHATEMIA    # CONSTIPATION - BOWEL REGIMEN AS NEEDED    # IMPAIRED GAIT DUE TO GENERALIZED MUSCLE WEAKNESS, CERVICAL & LS SPONDYLOPATHY, POLYARTHRITIS AND PERIPHERAL NEUROPATHY  # OP    # ADULT FAILURE TO THRIVE, SEVERE PROTEIN CALORIE MALNUTRITION - HIGH RISK FOR WORSENING MALNUTRITION     # HTN, HLD - ANTI HTN Rx ON HOLD DUE TO HYPOTENSION    # H/O ADRENAL INSUFFICIENCY - ON IV. HYDROCORTISONE 50 MG Q 12 HRS     # DEPRESSION, ANXIETY NEUROSIS - NOT ON ANY Rx AT HOME - GETS PSYCHOTHERAPY ALONE    # H/O MELANOMA    # GI AND DVT PROPHYLAXIS

## 2025-06-23 NOTE — DISCHARGE NOTE PROVIDER - PROVIDER TOKENS
PROVIDER:[TOKEN:[[id number]],FOLLOWUP:[1 week]],PROVIDER:[TOKEN:[[id number]],FOLLOWUP:[1 week]] PROVIDER:[TOKEN:[[id number]],FOLLOWUP:[Routine]],PROVIDER:[TOKEN:[[id number]],FOLLOWUP:[1 week]],FREE:[LAST:[FAcility MD ar rehabilitation],PHONE:[(   )    -],FAX:[(   )    -],FOLLOWUP:[1-3 days]]

## 2025-06-24 LAB
ALBUMIN SERPL ELPH-MCNC: 2.7 G/DL — LOW (ref 3.5–5)
ALBUMIN SERPL ELPH-MCNC: 3.1 G/DL — LOW (ref 3.5–5)
ALP SERPL-CCNC: 56 U/L — SIGNIFICANT CHANGE UP (ref 40–120)
ALP SERPL-CCNC: 57 U/L — SIGNIFICANT CHANGE UP (ref 40–120)
ALT FLD-CCNC: 29 U/L DA — SIGNIFICANT CHANGE UP (ref 10–60)
ALT FLD-CCNC: 34 U/L DA — SIGNIFICANT CHANGE UP (ref 10–60)
ANION GAP SERPL CALC-SCNC: 5 MMOL/L — SIGNIFICANT CHANGE UP (ref 5–17)
ANION GAP SERPL CALC-SCNC: 6 MMOL/L — SIGNIFICANT CHANGE UP (ref 5–17)
APPEARANCE UR: CLEAR — SIGNIFICANT CHANGE UP
AST SERPL-CCNC: 13 U/L — SIGNIFICANT CHANGE UP (ref 10–40)
AST SERPL-CCNC: 15 U/L — SIGNIFICANT CHANGE UP (ref 10–40)
BACTERIA # UR AUTO: ABNORMAL /HPF
BASOPHILS # BLD AUTO: 0.04 K/UL — SIGNIFICANT CHANGE UP (ref 0–0.2)
BASOPHILS # BLD AUTO: 0.06 K/UL — SIGNIFICANT CHANGE UP (ref 0–0.2)
BASOPHILS NFR BLD AUTO: 0.3 % — SIGNIFICANT CHANGE UP (ref 0–2)
BASOPHILS NFR BLD AUTO: 0.5 % — SIGNIFICANT CHANGE UP (ref 0–2)
BILIRUB SERPL-MCNC: 0.5 MG/DL — SIGNIFICANT CHANGE UP (ref 0.2–1.2)
BILIRUB SERPL-MCNC: 0.6 MG/DL — SIGNIFICANT CHANGE UP (ref 0.2–1.2)
BILIRUB UR-MCNC: NEGATIVE — SIGNIFICANT CHANGE UP
BUN SERPL-MCNC: 19 MG/DL — HIGH (ref 7–18)
BUN SERPL-MCNC: 21 MG/DL — HIGH (ref 7–18)
CALCIUM SERPL-MCNC: 7.8 MG/DL — LOW (ref 8.4–10.5)
CALCIUM SERPL-MCNC: 8.9 MG/DL — SIGNIFICANT CHANGE UP (ref 8.4–10.5)
CHLORIDE SERPL-SCNC: 100 MMOL/L — SIGNIFICANT CHANGE UP (ref 96–108)
CHLORIDE SERPL-SCNC: 101 MMOL/L — SIGNIFICANT CHANGE UP (ref 96–108)
CO2 SERPL-SCNC: 24 MMOL/L — SIGNIFICANT CHANGE UP (ref 22–31)
CO2 SERPL-SCNC: 28 MMOL/L — SIGNIFICANT CHANGE UP (ref 22–31)
COLOR SPEC: YELLOW — SIGNIFICANT CHANGE UP
CREAT SERPL-MCNC: 0.96 MG/DL — SIGNIFICANT CHANGE UP (ref 0.5–1.3)
CREAT SERPL-MCNC: 1.13 MG/DL — SIGNIFICANT CHANGE UP (ref 0.5–1.3)
DIFF PNL FLD: ABNORMAL
EGFR: 72 ML/MIN/1.73M2 — SIGNIFICANT CHANGE UP
EGFR: 72 ML/MIN/1.73M2 — SIGNIFICANT CHANGE UP
EGFR: 88 ML/MIN/1.73M2 — SIGNIFICANT CHANGE UP
EGFR: 88 ML/MIN/1.73M2 — SIGNIFICANT CHANGE UP
EOSINOPHIL # BLD AUTO: 0.04 K/UL — SIGNIFICANT CHANGE UP (ref 0–0.5)
EOSINOPHIL # BLD AUTO: 0.08 K/UL — SIGNIFICANT CHANGE UP (ref 0–0.5)
EOSINOPHIL NFR BLD AUTO: 0.3 % — SIGNIFICANT CHANGE UP (ref 0–6)
EOSINOPHIL NFR BLD AUTO: 0.6 % — SIGNIFICANT CHANGE UP (ref 0–6)
EPI CELLS # UR: PRESENT
FLUAV AG NPH QL: SIGNIFICANT CHANGE UP
FLUBV AG NPH QL: SIGNIFICANT CHANGE UP
GLUCOSE BLDC GLUCOMTR-MCNC: 145 MG/DL — HIGH (ref 70–99)
GLUCOSE SERPL-MCNC: 145 MG/DL — HIGH (ref 70–99)
GLUCOSE SERPL-MCNC: 84 MG/DL — SIGNIFICANT CHANGE UP (ref 70–99)
GLUCOSE UR QL: NEGATIVE MG/DL — SIGNIFICANT CHANGE UP
HCT VFR BLD CALC: 32.5 % — LOW (ref 39–50)
HCT VFR BLD CALC: 35.1 % — LOW (ref 39–50)
HGB BLD-MCNC: 11.7 G/DL — LOW (ref 13–17)
HGB BLD-MCNC: 12.8 G/DL — LOW (ref 13–17)
IMM GRANULOCYTES NFR BLD AUTO: 0.3 % — SIGNIFICANT CHANGE UP (ref 0–0.9)
IMM GRANULOCYTES NFR BLD AUTO: 0.5 % — SIGNIFICANT CHANGE UP (ref 0–0.9)
KETONES UR QL: NEGATIVE MG/DL — SIGNIFICANT CHANGE UP
LACTATE SERPL-SCNC: 1.1 MMOL/L — SIGNIFICANT CHANGE UP (ref 0.7–2)
LACTATE SERPL-SCNC: 2.2 MMOL/L — HIGH (ref 0.7–2)
LEUKOCYTE ESTERASE UR-ACNC: ABNORMAL
LYMPHOCYTES # BLD AUTO: 17.6 % — SIGNIFICANT CHANGE UP (ref 13–44)
LYMPHOCYTES # BLD AUTO: 2.51 K/UL — SIGNIFICANT CHANGE UP (ref 1–3.3)
LYMPHOCYTES # BLD AUTO: 2.73 K/UL — SIGNIFICANT CHANGE UP (ref 1–3.3)
LYMPHOCYTES # BLD AUTO: 21 % — SIGNIFICANT CHANGE UP (ref 13–44)
MAGNESIUM SERPL-MCNC: 1.8 MG/DL — SIGNIFICANT CHANGE UP (ref 1.6–2.6)
MAGNESIUM SERPL-MCNC: 2.1 MG/DL — SIGNIFICANT CHANGE UP (ref 1.6–2.6)
MCHC RBC-ENTMCNC: 31.5 PG — SIGNIFICANT CHANGE UP (ref 27–34)
MCHC RBC-ENTMCNC: 31.7 PG — SIGNIFICANT CHANGE UP (ref 27–34)
MCHC RBC-ENTMCNC: 36 G/DL — SIGNIFICANT CHANGE UP (ref 32–36)
MCHC RBC-ENTMCNC: 36.5 G/DL — HIGH (ref 32–36)
MCV RBC AUTO: 86.9 FL — SIGNIFICANT CHANGE UP (ref 80–100)
MCV RBC AUTO: 87.4 FL — SIGNIFICANT CHANGE UP (ref 80–100)
MONOCYTES # BLD AUTO: 0.93 K/UL — HIGH (ref 0–0.9)
MONOCYTES # BLD AUTO: 1.3 K/UL — HIGH (ref 0–0.9)
MONOCYTES NFR BLD AUTO: 10 % — SIGNIFICANT CHANGE UP (ref 2–14)
MONOCYTES NFR BLD AUTO: 6.5 % — SIGNIFICANT CHANGE UP (ref 2–14)
NEUTROPHILS # BLD AUTO: 10.68 K/UL — HIGH (ref 1.8–7.4)
NEUTROPHILS # BLD AUTO: 8.76 K/UL — HIGH (ref 1.8–7.4)
NEUTROPHILS NFR BLD AUTO: 67.6 % — SIGNIFICANT CHANGE UP (ref 43–77)
NEUTROPHILS NFR BLD AUTO: 74.8 % — SIGNIFICANT CHANGE UP (ref 43–77)
NITRITE UR-MCNC: POSITIVE
NRBC BLD AUTO-RTO: 0 /100 WBCS — SIGNIFICANT CHANGE UP (ref 0–0)
NRBC BLD AUTO-RTO: 0 /100 WBCS — SIGNIFICANT CHANGE UP (ref 0–0)
PH UR: 5.5 — SIGNIFICANT CHANGE UP (ref 5–8)
PHOSPHATE SERPL-MCNC: 3.7 MG/DL — SIGNIFICANT CHANGE UP (ref 2.5–4.5)
PHOSPHATE SERPL-MCNC: 3.8 MG/DL — SIGNIFICANT CHANGE UP (ref 2.5–4.5)
PLATELET # BLD AUTO: 242 K/UL — SIGNIFICANT CHANGE UP (ref 150–400)
PLATELET # BLD AUTO: 252 K/UL — SIGNIFICANT CHANGE UP (ref 150–400)
POTASSIUM SERPL-MCNC: 3.6 MMOL/L — SIGNIFICANT CHANGE UP (ref 3.5–5.3)
POTASSIUM SERPL-MCNC: 3.7 MMOL/L — SIGNIFICANT CHANGE UP (ref 3.5–5.3)
POTASSIUM SERPL-SCNC: 3.6 MMOL/L — SIGNIFICANT CHANGE UP (ref 3.5–5.3)
POTASSIUM SERPL-SCNC: 3.7 MMOL/L — SIGNIFICANT CHANGE UP (ref 3.5–5.3)
PROT SERPL-MCNC: 6 G/DL — SIGNIFICANT CHANGE UP (ref 6–8.3)
PROT SERPL-MCNC: 6.4 G/DL — SIGNIFICANT CHANGE UP (ref 6–8.3)
PROT UR-MCNC: NEGATIVE MG/DL — SIGNIFICANT CHANGE UP
RBC # BLD: 3.72 M/UL — LOW (ref 4.2–5.8)
RBC # BLD: 4.04 M/UL — LOW (ref 4.2–5.8)
RBC # FLD: 12.4 % — SIGNIFICANT CHANGE UP (ref 10.3–14.5)
RBC # FLD: 12.6 % — SIGNIFICANT CHANGE UP (ref 10.3–14.5)
RBC CASTS # UR COMP ASSIST: 8 /HPF — HIGH (ref 0–4)
RSV RNA NPH QL NAA+NON-PROBE: SIGNIFICANT CHANGE UP
SARS-COV-2 RNA SPEC QL NAA+PROBE: SIGNIFICANT CHANGE UP
SODIUM SERPL-SCNC: 131 MMOL/L — LOW (ref 135–145)
SODIUM SERPL-SCNC: 133 MMOL/L — LOW (ref 135–145)
SOURCE RESPIRATORY: SIGNIFICANT CHANGE UP
SP GR SPEC: 1.02 — SIGNIFICANT CHANGE UP (ref 1–1.03)
UROBILINOGEN FLD QL: 1 MG/DL — SIGNIFICANT CHANGE UP (ref 0.2–1)
WBC # BLD: 13.08 K/UL — HIGH (ref 3.8–10.5)
WBC # BLD: 14.27 K/UL — HIGH (ref 3.8–10.5)
WBC # FLD AUTO: 13.08 K/UL — HIGH (ref 3.8–10.5)
WBC # FLD AUTO: 14.27 K/UL — HIGH (ref 3.8–10.5)
WBC UR QL: 45 /HPF — HIGH (ref 0–5)

## 2025-06-24 PROCEDURE — 71045 X-RAY EXAM CHEST 1 VIEW: CPT | Mod: 26

## 2025-06-24 PROCEDURE — 99231 SBSQ HOSP IP/OBS SF/LOW 25: CPT

## 2025-06-24 RX ORDER — ACETAMINOPHEN 500 MG/5ML
1000 LIQUID (ML) ORAL ONCE
Refills: 0 | Status: COMPLETED | OUTPATIENT
Start: 2025-06-24 | End: 2025-06-24

## 2025-06-24 RX ORDER — CEFTRIAXONE 500 MG/1
1000 INJECTION, POWDER, FOR SOLUTION INTRAMUSCULAR; INTRAVENOUS ONCE
Refills: 0 | Status: COMPLETED | OUTPATIENT
Start: 2025-06-24 | End: 2025-06-24

## 2025-06-24 RX ORDER — SODIUM CHLORIDE 9 G/1000ML
500 INJECTION, SOLUTION INTRAVENOUS ONCE
Refills: 0 | Status: DISCONTINUED | OUTPATIENT
Start: 2025-06-24 | End: 2025-06-24

## 2025-06-24 RX ORDER — SODIUM CHLORIDE 9 G/1000ML
500 INJECTION, SOLUTION INTRAVENOUS ONCE
Refills: 0 | Status: COMPLETED | OUTPATIENT
Start: 2025-06-24 | End: 2025-06-24

## 2025-06-24 RX ORDER — CEFTRIAXONE 500 MG/1
INJECTION, POWDER, FOR SOLUTION INTRAMUSCULAR; INTRAVENOUS
Refills: 0 | Status: DISCONTINUED | OUTPATIENT
Start: 2025-06-24 | End: 2025-06-27

## 2025-06-24 RX ORDER — CEFTRIAXONE 500 MG/1
1000 INJECTION, POWDER, FOR SOLUTION INTRAMUSCULAR; INTRAVENOUS EVERY 24 HOURS
Refills: 0 | Status: DISCONTINUED | OUTPATIENT
Start: 2025-06-25 | End: 2025-06-27

## 2025-06-24 RX ORDER — FLUTICASONE PROPIONATE 50 UG/1
1 SPRAY, METERED NASAL
Refills: 0 | Status: DISCONTINUED | OUTPATIENT
Start: 2025-06-24 | End: 2025-06-27

## 2025-06-24 RX ORDER — ACETAMINOPHEN 500 MG/5ML
650 LIQUID (ML) ORAL EVERY 6 HOURS
Refills: 0 | Status: DISCONTINUED | OUTPATIENT
Start: 2025-06-24 | End: 2025-06-27

## 2025-06-24 RX ADMIN — CEFTRIAXONE 100 MILLIGRAM(S): 500 INJECTION, POWDER, FOR SOLUTION INTRAMUSCULAR; INTRAVENOUS at 19:06

## 2025-06-24 RX ADMIN — SODIUM CHLORIDE 500 MILLILITER(S): 9 INJECTION, SOLUTION INTRAVENOUS at 18:45

## 2025-06-24 RX ADMIN — Medication 2 TABLET(S): at 21:07

## 2025-06-24 RX ADMIN — ENOXAPARIN SODIUM 40 MILLIGRAM(S): 100 INJECTION SUBCUTANEOUS at 05:48

## 2025-06-24 RX ADMIN — POLYETHYLENE GLYCOL 3350 17 GRAM(S): 17 POWDER, FOR SOLUTION ORAL at 21:07

## 2025-06-24 RX ADMIN — SODIUM CHLORIDE 500 MILLILITER(S): 9 INJECTION, SOLUTION INTRAVENOUS at 20:00

## 2025-06-24 RX ADMIN — Medication 650 MILLIGRAM(S): at 16:02

## 2025-06-24 RX ADMIN — Medication 650 MILLIGRAM(S): at 18:10

## 2025-06-24 RX ADMIN — Medication 25 MILLIGRAM(S): at 05:47

## 2025-06-24 RX ADMIN — Medication 1 SPRAY(S): at 20:51

## 2025-06-24 RX ADMIN — Medication 25 MILLIGRAM(S): at 18:02

## 2025-06-24 RX ADMIN — Medication 1 APPLICATION(S): at 05:50

## 2025-06-24 RX ADMIN — FLUTICASONE PROPIONATE 1 SPRAY(S): 50 SPRAY, METERED NASAL at 20:51

## 2025-06-24 NOTE — PROGRESS NOTE ADULT - ATTENDING COMMENTS
66 y/o male with HTN, HLD and adrenal insufficiency presenting with nausea/vomiting lethargy after a URI last week. ICU consulted for hyponatremia. Patient appears euvolemic. Awake and alert. No evidence of seizures or obtunded mentation. Urine studies suggestive of SIADH.    Assessment:  1. Severe hypotonic euvolemic hyponatremia   2. Vomiting   3. Chronic adrenal insufficiency  4. Hypertension   5. Melanoma       Plan:   - Close neurologic monitoring  - Avoid rapid correction of serum sodium > 6 -8 meq in 24 hours   - S/P 3% Sodium chloride 30 cc/hr   - Check BMP and urine lytes q4-6 hours  - Fluid restriction  - Cont hydrocortisone dose   - Check cortisol level  - Endocrinology consult noted   - Neph eval noted   - Reglan for nausea   - Oral diet as tolerated  - DVT and stress ulcer prophylaxis  - OOB to chair.
64 y/o male with HTN, HLD and adrenal insufficiency presenting with nausea/vomiting lethargy after a URI last week. ICU consulted for hyponatremia. Patient appears euvolemic. Awake and alert. No evidence of seizures or obtunded mentation. Urine studies suggestive of SIADH.    Assessment:  1. Severe hypotonic euvolemic hyponatremia   2. Vomiting   3. Chronic adrenal insufficiency  4. Hypertension   5. Melanoma       Plan:   - Close neurologic monitoring  - Avoid rapid correction of serum sodium > 6 -8 meq in 24 hours   - S/P 3% Sodium chloride 30 cc/hr   - Check BMP and urine lytes q4-6 hours  - Fluid restriction  - Cont hydrocortisone dose   - Check cortisol level  - Endocrinology consult noted   - Neph eval noted   - Reglan for nausea   - Oral diet as tolerated  - DVT and stress ulcer prophylaxis  - OOB to chair.
I agree with the resident progress note/outlined plan of care. I have the edited the above note as needed.
I agree with the resident progress note/outlined plan of care. I have the edited the above note as needed. My independent findings and conclusions are documented.    Counselled patient again to increase the doses of hydrocortisone in times of illness
66 y/o male with HTN, HLD and adrenal insufficiency presenting with nausea/vomiting lethargy after a URI last week. ICU consulted for hyponatremia. Patient appears euvolemic. Awake and alert. No evidence of seizures or obtunded mentation. Urine studies suggestive of SIADH.    Assessment:  1. Severe hypotonic euvolemic hyponatremia   2. Vomiting   3. Chronic adrenal insufficiency  4. Hypertension     Plan:   - Close neurologic monitoring  - Avoid rapid correction of serum sodium > 6 -8 meq in 24 hours   - Cont 3% Sodium chloride 30 cc/hr   - Check BMP and urine lytes q4-6 hours  - Fluid restriction  - Cont hydrocortisone dose   - Check cortisol level  - Endocrinology consult  - Reglan for nausea   - Oral diet as tolerated  - DVT and stress ulcer prophylaxis  - OOB to chair

## 2025-06-24 NOTE — PROGRESS NOTE ADULT - SUBJECTIVE AND OBJECTIVE BOX
NEPHROLOGY MEDICAL CARE, [facility] - Dr. Dequan Suresh/ Dr. Mary Lance/ Dr. Jean Paul Smalls/ Dr. Shawn Alvarez    Date of Service: 25    Patient was seen and examined at bedside.    CC: patient is okay and NAD  fever today.    Vital Signs Last 24 Hrs  T(C): 38.9 (2025 15:19), Max: 38.9 (2025 15:19)  T(F): 102 (2025 15:19), Max: 102 (2025 15:19)  HR: 110 (2025 15:00) (63 - 120)  BP: 135/83 (2025 15:00) (117/77 - 136/82)  BP(mean): 100 (2025 13:10) (92 - 100)  RR: 18 (2025 15:00) (17 - 18)  SpO2: 97% (2025 15:00) (96% - 98%)    Parameters below as of 2025 15:00  Patient On (Oxygen Delivery Method): room air          PHYSICAL EXAM:  General: No acute respiratory distress.  Eyes: conjunctiva and sclera clear  ENMT: Atraumatic, Normocephalic, supple, No JVD present. Moist mucous membranes  Respiratory: Bilateral clear lungs; No rales, rhonchi, wheezing  Cardiovascular: S1S2+; no m/r/g  Gastrointestinal: Soft, Non-tender, Nondistended; Bowel sounds present,   Neuro:  Awake, Alert & Oriented X3  Ext:   No edema, No Cyanosis  Skin: No visible rashes    MEDICATIONS:  MEDICATIONS  (STANDING):  acetaminophen   IVPB .. 1000 milliGRAM(s) IV Intermittent once  cefTRIAXone   IVPB      cefTRIAXone   IVPB 1000 milliGRAM(s) IV Intermittent once  chlorhexidine 2% Cloths 1 Application(s) Topical <User Schedule>  enoxaparin Injectable 40 milliGRAM(s) SubCutaneous every 24 hours  hydrocortisone 25 milliGRAM(s) Oral two times a day  lactated ringers Bolus 500 milliLiter(s) IV Bolus once  lactated ringers Bolus 500 milliLiter(s) IV Bolus once  polyethylene glycol 3350 17 Gram(s) Oral at bedtime  senna 2 Tablet(s) Oral at bedtime    MEDICATIONS  (PRN):  acetaminophen     Tablet .. 650 milliGRAM(s) Oral every 6 hours PRN Temp greater or equal to 38C (100.4F), Mild Pain (1 - 3)  OLANZapine 2.5 milliGRAM(s) Oral once PRN agitation  ondansetron    Tablet 4 milliGRAM(s) Oral daily PRN Nausea and/or Vomiting          LABS:                        12.8   13.08 )-----------( 252      ( 2025 06:00 )             35.1         133[L]  |  100  |  19[H]  ----------------------------<  84  3.7   |  28  |  0.96    Ca    8.9      2025 06:00  Phos  3.7     -24  Mg     2.1     24    TPro  6.4  /  Alb  3.1[L]  /  TBili  0.6  /  DBili  x   /  AST  13  /  ALT  34  /  AlkPhos  57  24      Urinalysis Basic - ( 2025 16:20 )    Color: Yellow / Appearance: Clear / S.019 / pH: x  Gluc: x / Ketone: x  / Bili: Negative / Urobili: 1.0 mg/dL   Blood: x / Protein: Negative mg/dL / Nitrite: Positive   Leuk Esterase: Small / RBC: 8 /HPF / WBC 45 /HPF   Sq Epi: x / Non Sq Epi: x / Bacteria: Too Numerous to count /HPF      Magnesium: 2.1 mg/dL ( @ 06:00)  Phosphorus: 3.7 mg/dL ( @ 06:00)    Urine studies    PTH and Vit D:

## 2025-06-24 NOTE — CHART NOTE - NSCHARTNOTEFT_GEN_A_CORE
Code sepsis called at 6:30PM, based on tachycardia to 120s, Temp 102, blood pressure 87/57 with repeat BP of 102/62 with MAP 75, sat 94-95% on RA. Pt denied any new symptoms including dyspnea, chest pain, palpitation, abdominal pain, N/V/D, urinary symptoms, cough. Primary team already had work up earlier during the day given the fever, including culture, limited rvp, cxr, u/a and UCx. U/A was found positive and pt was started on abx, cxr neg for any consolidation or effusion, physical exam noted for bilateral air entry without wheeze, crackles, or rhonchi, heart sounds normal, no murmur, no abdominal tenderness, no LE edema/erythema, IV sites did not show any signs of thrombophlebitis, neg Brudzinski.   will send Lactate, give 1L Bolus LR and monitor for now. as well will send full rvp to complete the work up olrando as the sat is ~94%.

## 2025-06-24 NOTE — PROGRESS NOTE ADULT - SUBJECTIVE AND OBJECTIVE BOX
Patient is a 65y old  Male who presents with a chief complaint of severe hyponatremia, cortisol crisis (2025 18:24)    PATIENT IS SEEN AND EXAMINED IN MEDICAL FLOOR.  NEVIN [    ]    LISSETH [   ]      GT [   ]    ALLERGIES:  No Known Allergies      Daily     Daily Weight in k.3 (2025 05:30)    VITALS:    Vital Signs Last 24 Hrs  T(C): 36.3 (2025 05:30), Max: 37.1 (2025 12:40)  T(F): 97.4 (2025 05:30), Max: 98.7 (2025 12:40)  HR: 63 (2025 05:30) (63 - 95)  BP: 125/82 (2025 05:30) (114/71 - 125/82)  BP(mean): 98 (2025 05:30) (85 - 98)  RR: 18 (2025 05:30) (17 - 18)  SpO2: 96% (2025 05:30) (96% - 97%)    Parameters below as of 2025 05:30  Patient On (Oxygen Delivery Method): room air        LABS:    CBC Full  -  ( 2025 06:00 )  WBC Count : 13.08 K/uL  RBC Count : 4.04 M/uL  Hemoglobin : 12.8 g/dL  Hematocrit : 35.1 %  Platelet Count - Automated : 252 K/uL  Mean Cell Volume : 86.9 fl  Mean Cell Hemoglobin : 31.7 pg  Mean Cell Hemoglobin Concentration : 36.5 g/dL  Auto Neutrophil # : x  Auto Lymphocyte # : x  Auto Monocyte # : x  Auto Eosinophil # : x  Auto Basophil # : x  Auto Neutrophil % : x  Auto Lymphocyte % : x  Auto Monocyte % : x  Auto Eosinophil % : x  Auto Basophil % : x      06-24    133[L]  |  100  |  19[H]  ----------------------------<  84  3.7   |  28  |  0.96    Ca    8.9      2025 06:00  Phos  3.7     06-24  Mg     2.1     06-24    TPro  6.4  /  Alb  3.1[L]  /  TBili  0.6  /  DBili  x   /  AST  13  /  ALT  34  /  AlkPhos  57  06-24    CAPILLARY BLOOD GLUCOSE            LIVER FUNCTIONS - ( 2025 06:00 )  Alb: 3.1 g/dL / Pro: 6.4 g/dL / ALK PHOS: 57 U/L / ALT: 34 U/L DA / AST: 13 U/L / GGT: x           Creatinine Trend: 0.96<--, 0.91<--, 0.87<--, 1.27<--, 0.90<--, 1.02<--  I&O's Summary              MEDICATIONS:    MEDICATIONS  (STANDING):  chlorhexidine 2% Cloths 1 Application(s) Topical <User Schedule>  enoxaparin Injectable 40 milliGRAM(s) SubCutaneous every 24 hours  hydrocortisone 25 milliGRAM(s) Oral two times a day  polyethylene glycol 3350 17 Gram(s) Oral at bedtime  senna 2 Tablet(s) Oral at bedtime      MEDICATIONS  (PRN):  OLANZapine 2.5 milliGRAM(s) Oral once PRN agitation  ondansetron    Tablet 4 milliGRAM(s) Oral daily PRN Nausea and/or Vomiting      REVIEW OF SYSTEMS:                           ALL ROS DONE [ X   ]    CONSTITUTIONAL:  LETHARGIC [   ], FEVER [   ], UNRESPONSIVE [   ]  CVS:  CP  [   ], SOB, [   ], PALPITATIONS [   ], DIZZYNESS [   ]  RS: COUGH [   ], SPUTUM [   ]  GI: ABDOMINAL PAIN [   ], NAUSEA [   ], VOMITINGS [   ], DIARRHEA [   ], CONSTIPATION [   ]  :  DYSURIA [   ], NOCTURIA [   ], INCREASED FREQUENCY [   ], DRIBLING [   ],  SKELETAL: PAINFUL JOINTS [   ], SWOLLEN JOINTS [   ], NECK ACHE [   ], LOW BACK ACHE [   ],  SKIN : ULCERS [   ], RASH [   ], ITCHING [   ]  CNS: HEAD ACHE [   ], DOUBLE VISION [   ], BLURRED VISION [   ], AMS / CONFUSION [   ], SEIZURES [   ], WEAKNESS [   ],TINGLING / NUMBNESS [   ]        PHYSICAL EXAMINATION:    GENERAL APPEARANCE: NO DISTRESS  HEENT:  NO PALLOR, NO  JVD,  NO   NODES, NECK SUPPLE  CVS: S1 +, S2 +,   RS: AEEB,  OCCASIONAL  RALES +,   NO RONCHI  ABD: SOFT, NT, NO, BS +  EXT: NO PE  SKIN: WARM,   SKELETAL:  ROM REDUCED AT CERVICAL & LS SPINE  CNS:  AAO X 3 ,   POOR POWER IN ALL LIMBS        RADIOLOGY :    < from: CT Chest w/ IV Cont (25 @ 14:22) >  IMPRESSION:  No bowel obstruction. Visualized appendix appears unremarkable. Collapse   mild hiatal hernia versus distal esophageal mural thickening. EGD may be   pursued for further evaluation. Possible focal mural thickening at the   cecum. Colonoscopy may be pursued to rule out colon cancer. Moderate to   large amount of stool in the rectum.    < end of copied text >  < from: CT Head No Cont (25 @ 14:17) >  IMPRESSION:    No acute intracranial  hemorrhage, edema or mass effect.    This study cannot exclude the possibility of a isodense mass or possible   metastatic disease. A follow-up MRI or CT head with contrast would be   necessary for improved sensitivity.    < end of copied text >  < from: CT Maxillofacial w/ IV Cont (23 @ 13:20) >  IMPRESSION:  No evidence of recurrent tumor.    Status post interval left mandibular molar extraction.    Otherwise stable exam.    < end of copied text >        ASSESSMENT :     hyponatremia   Chronic adrenal insufficiency  Hypertension, HLD  Melanoma   - DEPRESSION, ANXIETY NEUROSIS/PANIC ATTACKS        PLAN:  HPI:  65M PMH HTN, HLD, depression, and melanoma presenting with nausea and vomiting x 4 days. He states that he had URI symptoms one week ago and since then has had decreased appetite. He reported that he had nausea and vomiting since Saturday, where he had 3-5 episodes of NBNB vomiting. He has had poor po intake since Saturday due to being nervous about feeling nauseous after, but has been drinking two to three 8 oz cups of water each day. He denies any dizziness, lightheadedness, blurry vision, or abdominal pain. He does report that he noticed his speech was slower than normal and that he is more lethargic. He denies missing any doses of his medications, including his hydrocortisone 10mg BID. He denies fevers, chills, CP, SOB, diarrhea, dysuria, numbness/tingling/weakness in extremities. He lives at home alone and ambulates independently.       # DC PLAN - PT EVALUATION AND PLACEMENT IN [facility]   - CHANGE ORAL HYDROCORTISONE TO ORAL DOSE PENDING ENDOCRINOLOGY/NEPHROLOGY RECOMMENDATION      # RESOLVED NAUSEA, VOMITING - ON ZOFRAN    # RESOLVED ACUTE URTI AND RESOLVING HEADACHES - SYMPTOMATIC PAIN Rx AS NEEDED  - MAXILLARY SINUSITIS    # SEVERE HYPONATREMIA - S/P ICU MONITORING, IV. NaCL 3%, IV. HYDROCORTISONE  # LIKELY SIADH - ON FLUID RESTRICTION 800 ML/DAY  # RESOLVING HYPOPHOSPHATEMIA    # CONSTIPATION - BOWEL REGIMEN AS NEEDED    # IMPAIRED GAIT DUE TO GENERALIZED MUSCLE WEAKNESS, CERVICAL & LS SPONDYLOPATHY, POLYARTHRITIS AND PERIPHERAL NEUROPATHY  # OP    # ADULT FAILURE TO THRIVE, SEVERE PROTEIN CALORIE MALNUTRITION - HIGH RISK FOR WORSENING MALNUTRITION     # HTN, HLD - ANTI HTN Rx ON HOLD DUE TO HYPOTENSION    # H/O ADRENAL INSUFFICIENCY - ON IV. HYDROCORTISONE 50 MG Q 12 HRS     # DEPRESSION, ANXIETY NEUROSIS - NOT ON ANY Rx AT HOME - GETS PSYCHOTHERAPY ALONE    # H/O MELANOMA    # GI AND DVT PROPHYLAXIS   Patient is a 65y old  Male who presents with a chief complaint of severe hyponatremia, cortisol crisis (2025 18:24)    PATIENT IS SEEN AND EXAMINED IN MEDICAL FLOOR.      ALLERGIES:  No Known Allergies      Daily     Daily Weight in k.3 (2025 05:30)    VITALS:    Vital Signs Last 24 Hrs  T(C): 36.3 (2025 05:30), Max: 37.1 (2025 12:40)  T(F): 97.4 (2025 05:30), Max: 98.7 (2025 12:40)  HR: 63 (2025 05:30) (63 - 95)  BP: 125/82 (2025 05:30) (114/71 - 125/82)  BP(mean): 98 (2025 05:30) (85 - 98)  RR: 18 (2025 05:30) (17 - 18)  SpO2: 96% (2025 05:30) (96% - 97%)    Parameters below as of 2025 05:30  Patient On (Oxygen Delivery Method): room air        LABS:    CBC Full  -  ( 2025 06:00 )  WBC Count : 13.08 K/uL  RBC Count : 4.04 M/uL  Hemoglobin : 12.8 g/dL  Hematocrit : 35.1 %  Platelet Count - Automated : 252 K/uL  Mean Cell Volume : 86.9 fl  Mean Cell Hemoglobin : 31.7 pg  Mean Cell Hemoglobin Concentration : 36.5 g/dL  Auto Neutrophil # : x  Auto Lymphocyte # : x  Auto Monocyte # : x  Auto Eosinophil # : x  Auto Basophil # : x  Auto Neutrophil % : x  Auto Lymphocyte % : x  Auto Monocyte % : x  Auto Eosinophil % : x  Auto Basophil % : x      06-24    133[L]  |  100  |  19[H]  ----------------------------<  84  3.7   |  28  |  0.96    Ca    8.9      2025 06:00  Phos  3.7     06-24  Mg     2.1     06-24    TPro  6.4  /  Alb  3.1[L]  /  TBili  0.6  /  DBili  x   /  AST  13  /  ALT  34  /  AlkPhos  57  06-24    CAPILLARY BLOOD GLUCOSE            LIVER FUNCTIONS - ( 2025 06:00 )  Alb: 3.1 g/dL / Pro: 6.4 g/dL / ALK PHOS: 57 U/L / ALT: 34 U/L DA / AST: 13 U/L / GGT: x           Creatinine Trend: 0.96<--, 0.91<--, 0.87<--, 1.27<--, 0.90<--, 1.02<--  I&O's Summary              MEDICATIONS:    MEDICATIONS  (STANDING):  chlorhexidine 2% Cloths 1 Application(s) Topical <User Schedule>  enoxaparin Injectable 40 milliGRAM(s) SubCutaneous every 24 hours  hydrocortisone 25 milliGRAM(s) Oral two times a day  polyethylene glycol 3350 17 Gram(s) Oral at bedtime  senna 2 Tablet(s) Oral at bedtime      MEDICATIONS  (PRN):  OLANZapine 2.5 milliGRAM(s) Oral once PRN agitation  ondansetron    Tablet 4 milliGRAM(s) Oral daily PRN Nausea and/or Vomiting      REVIEW OF SYSTEMS:                           ALL ROS DONE [ X   ]    CONSTITUTIONAL:  LETHARGIC [   ], FEVER [   ], UNRESPONSIVE [   ]  CVS:  CP  [   ], SOB, [   ], PALPITATIONS [   ], DIZZYNESS [   ]  RS: COUGH [   ], SPUTUM [   ]  GI: ABDOMINAL PAIN [   ], NAUSEA [   ], VOMITINGS [   ], DIARRHEA [   ], CONSTIPATION [   ]  :  DYSURIA [   ], NOCTURIA [   ], INCREASED FREQUENCY [   ], DRIBLING [   ],  SKELETAL: PAINFUL JOINTS [   ], SWOLLEN JOINTS [   ], NECK ACHE [   ], LOW BACK ACHE [   ],  SKIN : ULCERS [   ], RASH [   ], ITCHING [   ]  CNS: HEAD ACHE [   ], DOUBLE VISION [   ], BLURRED VISION [   ], AMS / CONFUSION [   ], SEIZURES [   ], WEAKNESS [   ],TINGLING / NUMBNESS [   ]        PHYSICAL EXAMINATION:    GENERAL APPEARANCE: NO DISTRESS  HEENT:  NO PALLOR, NO  JVD,  NO   NODES, NECK SUPPLE  CVS: S1 +, S2 +,   RS: AEEB,  OCCASIONAL  RALES +,   NO RONCHI  ABD: SOFT, NT, NO, BS +  EXT: NO PE  SKIN: WARM,   SKELETAL:  ROM REDUCED AT CERVICAL & LS SPINE  CNS:  AAO X 3 ,   POOR POWER IN ALL LIMBS        RADIOLOGY :    < from: CT Chest w/ IV Cont (25 @ 14:22) >  IMPRESSION:  No bowel obstruction. Visualized appendix appears unremarkable. Collapse   mild hiatal hernia versus distal esophageal mural thickening. EGD may be   pursued for further evaluation. Possible focal mural thickening at the   cecum. Colonoscopy may be pursued to rule out colon cancer. Moderate to   large amount of stool in the rectum.    < end of copied text >  < from: CT Head No Cont (25 @ 14:17) >  IMPRESSION:    No acute intracranial  hemorrhage, edema or mass effect.    This study cannot exclude the possibility of a isodense mass or possible   metastatic disease. A follow-up MRI or CT head with contrast would be   necessary for improved sensitivity.    < end of copied text >  < from: CT Maxillofacial w/ IV Cont (23 @ 13:20) >  IMPRESSION:  No evidence of recurrent tumor.    Status post interval left mandibular molar extraction.    Otherwise stable exam.    < end of copied text >        ASSESSMENT :     hyponatremia   Chronic adrenal insufficiency  Hypertension, HLD  Melanoma   - DEPRESSION, ANXIETY NEUROSIS/PANIC ATTACKS        PLAN:  HPI:  65M PMH HTN, HLD, depression, and melanoma presenting with nausea and vomiting x 4 days. He states that he had URI symptoms one week ago and since then has had decreased appetite. He reported that he had nausea and vomiting since Saturday, where he had 3-5 episodes of NBNB vomiting. He has had poor po intake since Saturday due to being nervous about feeling nauseous after, but has been drinking two to three 8 oz cups of water each day. He denies any dizziness, lightheadedness, blurry vision, or abdominal pain. He does report that he noticed his speech was slower than normal and that he is more lethargic. He denies missing any doses of his medications, including his hydrocortisone 10mg BID. He denies fevers, chills, CP, SOB, diarrhea, dysuria, numbness/tingling/weakness in extremities. He lives at home alone and ambulates independently.       # DC PLAN - PT EVALUATION AND PLACEMENT IN [facility]   - CHANGE ORAL HYDROCORTISONE TO ORAL DOSE PENDING ENDOCRINOLOGY/NEPHROLOGY RECOMMENDATION      # CODE SEPSIS CALLED   - NO COUGH, RHINORRHEA, CHEST PAIN, DYSPNEA, ABDOMINAL PAIN, N/V/C/D, RASHES, SWELLING  - STARTED ROCEPHIN, F/U BCX AND UCX  - F/U RVP  - F/U CXR  - PLANNED FOR SMALL BOLUS OF FLUID [JUDICIOUSLY]  - ID CONSULT CALLED      # SEVERE HYPONATREMIA - S/P ICU MONITORING, IV. NaCL 3%, IV. HYDROCORTISONE  # LIKELY SIADH - ON FLUID RESTRICTION 800 ML/DAY  # RESOLVING HYPOPHOSPHATEMIA    # RESOLVED ACUTE URTI AND RESOLVING HEADACHES - SYMPTOMATIC PAIN Rx AS NEEDED  - MAXILLARY SINUSITIS    # CONSTIPATION - BOWEL REGIMEN AS NEEDED    # IMPAIRED GAIT DUE TO GENERALIZED MUSCLE WEAKNESS, CERVICAL & LS SPONDYLOPATHY, POLYARTHRITIS AND PERIPHERAL NEUROPATHY  # OP    # ADULT FAILURE TO THRIVE, SEVERE PROTEIN CALORIE MALNUTRITION - HIGH RISK FOR WORSENING MALNUTRITION     # HTN, HLD - ANTI HTN Rx ON HOLD DUE TO HYPOTENSION    # H/O ADRENAL INSUFFICIENCY - ON IV. HYDROCORTISONE 50 MG Q 12 HRS     # DEPRESSION, ANXIETY NEUROSIS - NOT ON ANY Rx AT HOME - GETS PSYCHOTHERAPY ALONE    # H/O MELANOMA    # GI AND DVT PROPHYLAXIS

## 2025-06-24 NOTE — PROGRESS NOTE ADULT - SUBJECTIVE AND OBJECTIVE BOX
NP Note discussed with  Primary Attending    Patient is a 65y old  Male who presents with a chief complaint of severe hyponatremia, cortisol crisis (24 Jun 2025 11:53)      INTERVAL HPI/OVERNIGHT EVENTS: no acute events overnight     MEDICATIONS  (STANDING):  chlorhexidine 2% Cloths 1 Application(s) Topical <User Schedule>  enoxaparin Injectable 40 milliGRAM(s) SubCutaneous every 24 hours  hydrocortisone 25 milliGRAM(s) Oral two times a day  polyethylene glycol 3350 17 Gram(s) Oral at bedtime  senna 2 Tablet(s) Oral at bedtime    MEDICATIONS  (PRN):  OLANZapine 2.5 milliGRAM(s) Oral once PRN agitation  ondansetron    Tablet 4 milliGRAM(s) Oral daily PRN Nausea and/or Vomiting      __________________________________________________  REVIEW OF SYSTEMS:    CONSTITUTIONAL: No fever,   RESPIRATORY: No cough; No shortness of breath  CARDIOVASCULAR: No chest pain, no palpitations  GASTROINTESTINAL: No pain. No nausea or vomiting; No diarrhea   NEUROLOGICAL: No headache or numbness, no tremors  MUSCULOSKELETAL: No joint pain, no muscle pain  GENITOURINARY: no dysuria, no frequency, no hesitancy  ALL OTHER  ROS negative        Vital Signs Last 24 Hrs  T(C): 36.3 (24 Jun 2025 05:30), Max: 37.1 (23 Jun 2025 12:40)  T(F): 97.4 (24 Jun 2025 05:30), Max: 98.7 (23 Jun 2025 12:40)  HR: 63 (24 Jun 2025 05:30) (63 - 95)  BP: 125/82 (24 Jun 2025 05:30) (114/71 - 125/82)  BP(mean): 98 (24 Jun 2025 05:30) (85 - 98)  RR: 18 (24 Jun 2025 05:30) (17 - 18)  SpO2: 96% (24 Jun 2025 05:30) (96% - 97%)    Parameters below as of 24 Jun 2025 05:30  Patient On (Oxygen Delivery Method): room air        ________________________________________________  PHYSICAL EXAM:  GENERAL: NAD  HEENT: Normocephalic  NECK : supple  CHEST/LUNG: Clear to auscultation bilaterally  HEART: S1 S2  regular  ABDOMEN: Soft, Nontender, Nondistended; Bowel sounds present  EXTREMITIES: no edema  SKIN: warm and dry  NERVOUS SYSTEM:  Awake and alert; Oriented  to place, person and time    _________________________________________________  LABS:                        12.8   13.08 )-----------( 252      ( 24 Jun 2025 06:00 )             35.1     06-24    133[L]  |  100  |  19[H]  ----------------------------<  84  3.7   |  28  |  0.96    Ca    8.9      24 Jun 2025 06:00  Phos  3.7     06-24  Mg     2.1     06-24    TPro  6.4  /  Alb  3.1[L]  /  TBili  0.6  /  DBili  x   /  AST  13  /  ALT  34  /  AlkPhos  57  06-24      Urinalysis Basic - ( 24 Jun 2025 06:00 )    Color: x / Appearance: x / SG: x / pH: x  Gluc: 84 mg/dL / Ketone: x  / Bili: x / Urobili: x   Blood: x / Protein: x / Nitrite: x   Leuk Esterase: x / RBC: x / WBC x   Sq Epi: x / Non Sq Epi: x / Bacteria: x      CAPILLARY BLOOD GLUCOSE            RADIOLOGY & ADDITIONAL TESTS:    < from: CT Chest w/ IV Cont (06.18.25 @ 14:22) >  IMPRESSION:  No bowel obstruction. Visualized appendix appears unremarkable. Collapse   mild hiatal hernia versus distal esophageal mural thickening. EGD may be   pursued for further evaluation. Possible focal mural thickening at the   cecum. Colonoscopy may be pursued to rule out colon cancer. Moderate to   large amount of stool in the rectum.    < end of copied text >    Imaging Personally Reviewed:  YES    Consultant(s) Notes Reviewed:   YES      Plan of care was discussed with patient and /or primary care giver; all questions and concerns were addressed and care was aligned with patient's wishes.

## 2025-06-24 NOTE — CHART NOTE - NSCHARTNOTEFT_GEN_A_CORE
Patient scheduled for DC today  Informed by RN of  new elevated temperature and heart rate all other VSS, patient does not endorse chills, cough, or abd pain. Does endorse mild discomfort during urination  Discussed with attending Dr. Cuba  Will order:  -RVP  -CXR  -U/A w/ reflex  -Blood cx x 2  -ID Dr. Mcdermott consulted-will start IV ceftriaxone

## 2025-06-24 NOTE — PROGRESS NOTE ADULT - SUBJECTIVE AND OBJECTIVE BOX
Subjective: No acute events overnight, Offers no complaints. States he is feeling much better.  Chart Notes, Work list Manager, and vital signs reviewed.    Review of Systems:  Constitutional: No fever  Cardiovascular: No chest pain, palpitations  Respiratory: No SOB, no cough  GI: No nausea, vomiting, abdominal pain  Hem/lymph: no swelling    Medications (Standing):  chlorhexidine 2% Cloths 1 Application(s) Topical <User Schedule>  enoxaparin Injectable 40 milliGRAM(s) SubCutaneous every 24 hours  hydrocortisone 25 milliGRAM(s) Oral two times a day  polyethylene glycol 3350 17 Gram(s) Oral at bedtime  senna 2 Tablet(s) Oral at bedtime    Medications (prn):  OLANZapine 2.5 milliGRAM(s) Oral once PRN agitation  ondansetron    Tablet 4 milliGRAM(s) Oral daily PRN Nausea and/or Vomiting      Physical Exam:  Vitals: T(C): 36.3 (06-24-25 @ 05:30)  T(F): 97.4 (06-24-25 @ 05:30), Max: 98.7 (06-23-25 @ 12:40)  HR: 63 (06-24-25 @ 05:30) (63 - 95)  BP: 125/82 (06-24-25 @ 05:30) (114/71 - 125/82)  RR:  (17 - 18)  SpO2:  (96% - 97%)    General: NAD,   HEENT:  Atraumatic, Normocephalic, drymucous membranes  Thyroid: Normal size, no palpable nodules  Respiratory: Clear to auscultation bilaterally; No rales, rhonchi, wheezing  CVS: Regular rate and rhythm; No murmurs; no peripheral edema  GI: Soft, nontender, non distended, +ve abdominal obesity  Extremities: +ve peripheral pulses, -ve pedal edema  Skin: Dry, intact, No rashes or lesions  Psych: Alert and oriented x 3    CAPILLARY BLOOD GLUCOSE        06-24    133[L]  |  100  |  19[H]  ----------------------------<  84  3.7   |  28  |  0.96    eGFR: 88    Ca    8.9      06-24  Mg     2.1     06-24  Phos  3.7     06-24    TPro  6.4  /  Alb  3.1[L]  /  TBili  0.6  /  DBili  x   /  AST  13  /  ALT  34  /  AlkPhos  57  06-24    Thyroid Function Tests:  06-20 @ 03:53 TSH 1.88 FreeT4 1.6 T3 -- Anti TPO -- Anti Thyroglobulin Ab -- TSI --        Assessment and Plan:    65y male with h/o Chronic secondary adrenal insufficiency, Subclinical Hypothyroidism, HTN, HLD, Depression (not on any meds), Melanoma, presented with 4-5 days of NBNB vomiting. Found to have Na 108. Admitted to ICU for severe hyponatremia. Endocrinology consulted for Adrenal crisis.    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  -Continue with Hydrocortisone taper   Taper to 25q12 for 2 days, then  Taper to 15q12 for 2 days, then  Taper to 10q12 for 2 days and continue lifelong             Subjective: No acute events overnight, Offers no complaints. States he is feeling much better.  Chart Notes, Work list Manager, and vital signs reviewed.    Review of Systems:  Constitutional: No fever  Cardiovascular: No chest pain, palpitations  Respiratory: No SOB, no cough  GI: No nausea, vomiting, abdominal pain  Hem/lymph: no swelling    Medications (Standing):  chlorhexidine 2% Cloths 1 Application(s) Topical <User Schedule>  enoxaparin Injectable 40 milliGRAM(s) SubCutaneous every 24 hours  hydrocortisone 25 milliGRAM(s) Oral two times a day  polyethylene glycol 3350 17 Gram(s) Oral at bedtime  senna 2 Tablet(s) Oral at bedtime    Medications (prn):  OLANZapine 2.5 milliGRAM(s) Oral once PRN agitation  ondansetron    Tablet 4 milliGRAM(s) Oral daily PRN Nausea and/or Vomiting      Physical Exam:  Vitals: T(C): 36.3 (06-24-25 @ 05:30)  T(F): 97.4 (06-24-25 @ 05:30), Max: 98.7 (06-23-25 @ 12:40)  HR: 63 (06-24-25 @ 05:30) (63 - 95)  BP: 125/82 (06-24-25 @ 05:30) (114/71 - 125/82)  RR:  (17 - 18)  SpO2:  (96% - 97%)    General: NAD,   HEENT:  Atraumatic, Normocephalic, drymucous membranes  Thyroid: Normal size, no palpable nodules  Respiratory: Clear to auscultation bilaterally; No rales, rhonchi, wheezing  CVS: Regular rate and rhythm; No murmurs; no peripheral edema  GI: Soft, nontender, non distended, +ve abdominal obesity  Extremities: +ve peripheral pulses, -ve pedal edema  Skin: Dry, intact, No rashes or lesions  Psych: Alert and oriented x 3    CAPILLARY BLOOD GLUCOSE        06-24    133[L]  |  100  |  19[H]  ----------------------------<  84  3.7   |  28  |  0.96    eGFR: 88    Ca    8.9      06-24  Mg     2.1     06-24  Phos  3.7     06-24    TPro  6.4  /  Alb  3.1[L]  /  TBili  0.6  /  DBili  x   /  AST  13  /  ALT  34  /  AlkPhos  57  06-24    Thyroid Function Tests:  06-20 @ 03:53 TSH 1.88 FreeT4 1.6 T3 -- Anti TPO -- Anti Thyroglobulin Ab -- TSI --        Assessment and Plan:    65y male with h/o Chronic secondary adrenal insufficiency, Subclinical Hypothyroidism, HTN, HLD, Depression (not on any meds), Melanoma, presented with 4-5 days of NBNB vomiting. Found to have Na 108. Admitted to ICU for severe hyponatremia. Endocrinology consulted for Adrenal crisis.    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 States he is feeling much better.  Chart Notes, Work list Manager, and vital signs reviewed.    Review of Systems:  Constitutional: No fever  Cardiovascular: No chest pain, palpitations  Respiratory: No SOB, no cough  GI: No nausea, vomiting, abdominal pain  Hem/lymph: no swelling    Medications (Standing):  chlorhexidine 2% Cloths 1 Application(s) Topical <User Schedule>  enoxaparin Injectable 40 milliGRAM(s) SubCutaneous every 24 hours  hydrocortisone 25 milliGRAM(s) Oral two times a day  polyethylene glycol 3350 17 Gram(s) Oral at bedtime  senna 2 Tablet(s) Oral at bedtime    Medications (prn):  OLANZapine 2.5 milliGRAM(s) Oral once PRN agitation  ondansetron    Tablet 4 milliGRAM(s) Oral daily PRN Nausea and/or Vomiting      Physical Exam:  Vitals: T(C): 36.3 (06-24-25 @ 05:30)  T(F): 97.4 (06-24-25 @ 05:30), Max: 98.7 (06-23-25 @ 12:40)  HR: 63 (06-24-25 @ 05:30) (63 - 95)  BP: 125/82 (06-24-25 @ 05:30) (114/71 - 125/82)  RR:  (17 - 18)  SpO2:  (96% - 97%)    General: NAD,   HEENT:  Atraumatic, Normocephalic, drymucous membranes  Thyroid: Normal size, no palpable nodules  Respiratory: Clear to auscultation bilaterally; No rales, rhonchi, wheezing  CVS: Regular rate and rhythm; No murmurs; no peripheral edema  GI: Soft, nontender, non distended, +ve abdominal obesity  Extremities: +ve peripheral pulses, -ve pedal edema  Skin: Dry, intact, No rashes or lesions  Psych: Alert and oriented x 3    Labs:    06-24  133[L]  |  100  |  19[H]  ----------------------------<  84  3.7   |  28  |  0.96  eGFR: 88  Ca    8.9      06-24  Mg     2.1     06-24  Phos  3.7     06-24  TPro  6.4  /  Alb  3.1[L]  /  TBili  0.6  /  DBili  x   /  AST  13  /  ALT  34  /  AlkPhos  57  06-24      Assessment and Plan:    65y male with h/o Chronic secondary adrenal insufficiency, Subclinical Hypothyroidism, HTN, HLD, Depression (not on any meds), Melanoma, presented with 4-5 days of NBNB vomiting. Found to have Na 108. Admitted to ICU for severe hyponatremia. Endocrinology consulted for Adrenal crisis.    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

## 2025-06-24 NOTE — PROGRESS NOTE ADULT - ASSESSMENT
1. Hyponatremia with unknown duration most likely chronic. Pt is clinically euvolemic. Multifactorial from High ADH state and Adrenal insuff.  -Na stable at 133; continue to observe.   -low uric acid  -continue Fluid restriction 800ml to 1L/day.   -Check Seurm Na daily. Monitor I/O's daily. Avoid overcorrection of NA (8-10meq/day)  2. HTN:   -bp is low   -hold bp meds  -monitor bp.  3. h/o Adrenal inuff:  -on iv hydrocortisone  -Endo consulted.   4. Hypophosphatemia:  -phos improved.   -monitor phos  5. Fever possible UTI.   -on rocephin  -f/u urine culture.

## 2025-06-24 NOTE — PROGRESS NOTE ADULT - PROBLEM SELECTOR PLAN 1
presenting with generalized weakness, nausea, vomiting   hyponatremia likely SIADH 2/2 possible cortisol crisis  takes hydrocortisone 10mg BID as per patient  c/w hydrocort taper as per endo recs  Taper to 25q12 for 2 days last day tomorrow 6/25 then  Taper to 15q12 for 2 days, then  Taper to 10q12 for 2 days and continue lifelong  Uric acid 3.2  - f/u free cortisol level  - Jennifer consulted: Dr Syed  - Nephro consulted: Dr Suresh

## 2025-06-25 DIAGNOSIS — A41.9 SEPSIS, UNSPECIFIED ORGANISM: ICD-10-CM

## 2025-06-25 LAB
ANION GAP SERPL CALC-SCNC: 5 MMOL/L — SIGNIFICANT CHANGE UP (ref 5–17)
BASOPHILS # BLD AUTO: 0.05 K/UL — SIGNIFICANT CHANGE UP (ref 0–0.2)
BASOPHILS NFR BLD AUTO: 0.3 % — SIGNIFICANT CHANGE UP (ref 0–2)
BUN SERPL-MCNC: 14 MG/DL — SIGNIFICANT CHANGE UP (ref 7–18)
CALCIUM SERPL-MCNC: 8.5 MG/DL — SIGNIFICANT CHANGE UP (ref 8.4–10.5)
CHLORIDE SERPL-SCNC: 97 MMOL/L — SIGNIFICANT CHANGE UP (ref 96–108)
CO2 SERPL-SCNC: 27 MMOL/L — SIGNIFICANT CHANGE UP (ref 22–31)
CREAT SERPL-MCNC: 0.78 MG/DL — SIGNIFICANT CHANGE UP (ref 0.5–1.3)
EGFR: 99 ML/MIN/1.73M2 — SIGNIFICANT CHANGE UP
EGFR: 99 ML/MIN/1.73M2 — SIGNIFICANT CHANGE UP
EOSINOPHIL # BLD AUTO: 0.03 K/UL — SIGNIFICANT CHANGE UP (ref 0–0.5)
EOSINOPHIL NFR BLD AUTO: 0.2 % — SIGNIFICANT CHANGE UP (ref 0–6)
GLUCOSE SERPL-MCNC: 100 MG/DL — HIGH (ref 70–99)
HCT VFR BLD CALC: 32.4 % — LOW (ref 39–50)
HGB BLD-MCNC: 11.9 G/DL — LOW (ref 13–17)
IMM GRANULOCYTES NFR BLD AUTO: 0.7 % — SIGNIFICANT CHANGE UP (ref 0–0.9)
LYMPHOCYTES # BLD AUTO: 1.38 K/UL — SIGNIFICANT CHANGE UP (ref 1–3.3)
LYMPHOCYTES # BLD AUTO: 9.3 % — LOW (ref 13–44)
MCHC RBC-ENTMCNC: 31.2 PG — SIGNIFICANT CHANGE UP (ref 27–34)
MCHC RBC-ENTMCNC: 36.7 G/DL — HIGH (ref 32–36)
MCV RBC AUTO: 84.8 FL — SIGNIFICANT CHANGE UP (ref 80–100)
MONOCYTES # BLD AUTO: 1.22 K/UL — HIGH (ref 0–0.9)
MONOCYTES NFR BLD AUTO: 8.2 % — SIGNIFICANT CHANGE UP (ref 2–14)
NEUTROPHILS # BLD AUTO: 12.01 K/UL — HIGH (ref 1.8–7.4)
NEUTROPHILS NFR BLD AUTO: 81.3 % — HIGH (ref 43–77)
NRBC BLD AUTO-RTO: 0 /100 WBCS — SIGNIFICANT CHANGE UP (ref 0–0)
PLATELET # BLD AUTO: 224 K/UL — SIGNIFICANT CHANGE UP (ref 150–400)
POTASSIUM SERPL-MCNC: 3.7 MMOL/L — SIGNIFICANT CHANGE UP (ref 3.5–5.3)
POTASSIUM SERPL-SCNC: 3.7 MMOL/L — SIGNIFICANT CHANGE UP (ref 3.5–5.3)
RBC # BLD: 3.82 M/UL — LOW (ref 4.2–5.8)
RBC # FLD: 12.8 % — SIGNIFICANT CHANGE UP (ref 10.3–14.5)
SODIUM SERPL-SCNC: 129 MMOL/L — LOW (ref 135–145)
WBC # BLD: 14.8 K/UL — HIGH (ref 3.8–10.5)
WBC # FLD AUTO: 14.8 K/UL — HIGH (ref 3.8–10.5)

## 2025-06-25 PROCEDURE — 99231 SBSQ HOSP IP/OBS SF/LOW 25: CPT

## 2025-06-25 RX ORDER — HYDROCORTISONE 20 MG
50 TABLET ORAL EVERY 12 HOURS
Refills: 0 | Status: DISCONTINUED | OUTPATIENT
Start: 2025-06-25 | End: 2025-06-26

## 2025-06-25 RX ADMIN — Medication 1 SPRAY(S): at 17:32

## 2025-06-25 RX ADMIN — Medication 1 APPLICATION(S): at 05:49

## 2025-06-25 RX ADMIN — CEFTRIAXONE 100 MILLIGRAM(S): 500 INJECTION, POWDER, FOR SOLUTION INTRAMUSCULAR; INTRAVENOUS at 17:28

## 2025-06-25 RX ADMIN — Medication 25 MILLIGRAM(S): at 05:51

## 2025-06-25 RX ADMIN — POLYETHYLENE GLYCOL 3350 17 GRAM(S): 17 POWDER, FOR SOLUTION ORAL at 22:31

## 2025-06-25 RX ADMIN — FLUTICASONE PROPIONATE 1 SPRAY(S): 50 SPRAY, METERED NASAL at 05:49

## 2025-06-25 RX ADMIN — Medication 1 GRAM(S): at 22:31

## 2025-06-25 RX ADMIN — ENOXAPARIN SODIUM 40 MILLIGRAM(S): 100 INJECTION SUBCUTANEOUS at 05:49

## 2025-06-25 RX ADMIN — FLUTICASONE PROPIONATE 1 SPRAY(S): 50 SPRAY, METERED NASAL at 17:30

## 2025-06-25 RX ADMIN — Medication 50 MILLIGRAM(S): at 17:37

## 2025-06-25 RX ADMIN — Medication 650 MILLIGRAM(S): at 08:30

## 2025-06-25 RX ADMIN — Medication 2 TABLET(S): at 22:31

## 2025-06-25 RX ADMIN — Medication 650 MILLIGRAM(S): at 06:05

## 2025-06-25 RX ADMIN — Medication 1 SPRAY(S): at 05:49

## 2025-06-25 NOTE — CONSULT NOTE ADULT - REASON FOR ADMISSION
severe hyponatremia, cortisol crisis
discussed with RN

## 2025-06-25 NOTE — PROGRESS NOTE ADULT - ASSESSMENT
1. Hyponatremia with unknown duration most likely chronic. Pt is clinically euvolemic. Multifactorial from High ADH state and Adrenal insuff.  -Na dropped to 129; start Nacl 1gm tid.    -low uric acid  -continue Fluid restriction 800ml to 1L/day.   -Check Seurm Na daily. Monitor I/O's daily. Avoid overcorrection of NA (8-10meq/day)  2. HTN:   -bp is low   -hold bp meds  -monitor bp.  3. h/o Adrenal inuff:  -on iv hydrocortisone  -Endo consulted.   4. Hypophosphatemia:  -phos improved.   -monitor phos  5. Fever possible UTI.   -on rocephin  -f/u urine culture.

## 2025-06-25 NOTE — PROGRESS NOTE ADULT - PROBLEM SELECTOR PLAN 3
takes hydrocortisone 10mg BID at home-follows with Dr. Syed outpatient  Endo recs for taper  rest of plan as above takes hydrocortisone 10mg BID at home-follows with Dr. Syed outpatient  Endo recs for IV hydrocortisone 50mg BID from today   rest of plan as above takes hydrocortisone 10mg BID at home-follows with Dr. Syed outpatient  Endo recs for IV hydrocortisone 50mg BID from today x 3 days then 25mg BID IV x 3 days per dr. Syed  rest of plan as above

## 2025-06-25 NOTE — PROGRESS NOTE ADULT - SUBJECTIVE AND OBJECTIVE BOX
Subjective:  Chart Notes, Work list Manager, and fingersticks reviewed.    Review of Systems:  Constitutional: No fever  Eyes: No blurry vision  Neuro: No tremors  HEENT: No pain  Cardiovascular: No chest pain, palpitations  Respiratory: No SOB, no cough  GI: No nausea, vomiting, abdominal pain  : No dysuria  Skin: no rash  Psych: no depression  Endocrine: no polyuria, polydipsia  Hem/lymph: no swelling  Osteoporosis: no fractures    ALL OTHER SYSTEMS REVIEWED AND NEGATIVE    UNABLE TO OBTAIN    MEDICATIONS  (STANDING):  cefTRIAXone   IVPB      cefTRIAXone   IVPB 1000 milliGRAM(s) IV Intermittent every 24 hours  chlorhexidine 2% Cloths 1 Application(s) Topical <User Schedule>  enoxaparin Injectable 40 milliGRAM(s) SubCutaneous every 24 hours  fluticasone propionate 50 MICROgram(s)/spray Nasal Spray 1 Spray(s) Both Nostrils two times a day  hydrocortisone 25 milliGRAM(s) Oral two times a day  oxymetazoline 0.05% Nasal Spray 1 Spray(s) Both Nostrils two times a day  polyethylene glycol 3350 17 Gram(s) Oral at bedtime  senna 2 Tablet(s) Oral at bedtime    MEDICATIONS  (PRN):  acetaminophen     Tablet .. 650 milliGRAM(s) Oral every 6 hours PRN Temp greater or equal to 38C (100.4F), Mild Pain (1 - 3)  OLANZapine 2.5 milliGRAM(s) Oral once PRN agitation  ondansetron    Tablet 4 milliGRAM(s) Oral daily PRN Nausea and/or Vomiting      PHYSICAL EXAM:  VITALS: T(C): 37.8 (06-25-25 @ 12:55)  T(F): 100 (06-25-25 @ 12:55), Max: 102 (06-24-25 @ 15:19)  HR: 104 (06-25-25 @ 12:55) (99 - 122)  BP: 126/77 (06-25-25 @ 12:55) (86/57 - 141/81)  RR:  (17 - 18)  SpO2:  (95% - 97%)  Wt(kg): --  GENERAL: NAD,   HEENT:  Atraumatic, Normocephalic, drymucous membranes  THYROID: Normal size, no palpable nodules  RESPIRATORY: Clear to auscultation bilaterally; No rales, rhonchi, wheezing  CARDIOVASCULAR: Regular rate and rhythm; No murmurs; no peripheral edema  GI: Soft, nontender, non distended, +ve abdominal obesity  EXTREMITIES: +ve peripheral pulses, -ve pedal edema  SKIN: Dry, intact, No rashes or lesions  PSYCH: Alert and oriented x 3    CAPILLARY BLOOD GLUCOSE      POCT Blood Glucose.: 145 mg/dL (24 Jun 2025 18:27)    06-25    129[L]  |  97  |  14  ----------------------------<  100[H]  3.7   |  27  |  0.78    eGFR: 99    Ca    8.5      06-25  Mg     1.8     06-24  Phos  3.8     06-24    TPro  6.0  /  Alb  2.7[L]  /  TBili  0.5  /  DBili  x   /  AST  15  /  ALT  29  /  AlkPhos  56  06-24    Thyroid Function Tests:  06-20 @ 03:53 TSH 1.88 FreeT4 1.6 T3 -- Anti TPO -- Anti Thyroglobulin Ab -- TSI --        Assessment and Plan:    1) Type 2 diabetes:      Recommendations:  - Basal Insulin:   Glargine  (Lantus) units once daily    - Nutritional Insulin:  Lispro (Admelog) units with breakfast, hold if NPO or eating <50% of meals  Lispro (Admelog) units with lunch, hold if NPO or eating <50% of meals  Lispro (Admelog) units with dinner, hold if NPO or eating <50% of meals    - Correctional (Sliding) Insulin:  Low Lispro (Admelog) sliding scale with meals and bedtime    - Oral Medications:  None in the hospital               Subjective: Patient was a code sepsis yesterday, found to have sepsis 2/2 UTI. His Na went to 129 from 131. Patient states he felt some chills last night, denies any weakness, dizziness, no change in appetite either.   Chart Notes, Work list Manager, and fingersticks reviewed.    Review of Systems:  Constitutional: + fever and chills  Eyes: No blurry vision  Neuro: No tremors  HEENT: No pain  Cardiovascular: No chest pain, palpitations  Respiratory: No SOB, no cough  GI: No nausea, vomiting, abdominal pain  : No dysuria  Skin: no rash  Psych: no depression  Endocrine: no polyuria, polydipsia  Hem/lymph: no swelling    Medications (Standing):  cefTRIAXone   IVPB      cefTRIAXone   IVPB 1000 milliGRAM(s) IV Intermittent every 24 hours  chlorhexidine 2% Cloths 1 Application(s) Topical <User Schedule>  enoxaparin Injectable 40 milliGRAM(s) SubCutaneous every 24 hours  fluticasone propionate 50 MICROgram(s)/spray Nasal Spray 1 Spray(s) Both Nostrils two times a day  hydrocortisone 25 milliGRAM(s) Oral two times a day  oxymetazoline 0.05% Nasal Spray 1 Spray(s) Both Nostrils two times a day  polyethylene glycol 3350 17 Gram(s) Oral at bedtime  senna 2 Tablet(s) Oral at bedtime    Medications (prn):  acetaminophen     Tablet .. 650 milliGRAM(s) Oral every 6 hours PRN Temp greater or equal to 38C (100.4F), Mild Pain (1 - 3)  OLANZapine 2.5 milliGRAM(s) Oral once PRN agitation  ondansetron    Tablet 4 milliGRAM(s) Oral daily PRN Nausea and/or Vomiting      Physical exam:  VITALS: T(C): 37.8 (06-25-25 @ 12:55)  T(F): 100 (06-25-25 @ 12:55), Max: 102 (06-24-25 @ 15:19)  HR: 104 (06-25-25 @ 12:55) (99 - 122)  BP: 126/77 (06-25-25 @ 12:55) (86/57 - 141/81)  RR:  (17 - 18)  SpO2:  (95% - 97%)  Wt(kg): --    General: NAD,   HEENT:  Atraumatic, Normocephalic, drymucous membranes  Thyroid: Normal size, no palpable nodules  Respiratory: Clear to auscultation bilaterally; No rales, rhonchi, wheezing  CVS: Regular rate and rhythm; No murmurs; no peripheral edema  GI: Soft, nontender, non distended, +ve abdominal obesity  Extremities: +ve peripheral pulses, -ve pedal edema  Skin: Dry, intact, No rashes or lesions  Psych: Alert and oriented x 3    CAPILLARY BLOOD GLUCOSE      POCT Blood Glucose.: 145 mg/dL (24 Jun 2025 18:27)    06-25    129[L]  |  97  |  14  ----------------------------<  100[H]  3.7   |  27  |  0.78    eGFR: 99    Ca    8.5      06-25  Mg     1.8     06-24  Phos  3.8     06-24    TPro  6.0  /  Alb  2.7[L]  /  TBili  0.5  /  DBili  x   /  AST  15  /  ALT  29  /  AlkPhos  56  06-24    Thyroid Function Tests:  06-20 @ 03:53 TSH 1.88 FreeT4 1.6 T3 -- Anti TPO -- Anti Thyroglobulin Ab -- TSI --        Assessment and Plan:      65y male with h/o Chronic secondary adrenal insufficiency, Subclinical Hypothyroidism, HTN, HLD, Depression (not on any meds), Melanoma, presented with 4-5 days of NBNB vomiting. Found to have Na 108. Admitted to ICU for severe hyponatremia. Endocrinology consulted for Adrenal crisis. 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   Please give Hydrocortisone 25mg IV once stat and give 50mg IV in the evening  C/w Hydrocortisone 50mg IV BID for now  Monitor Na  F/u blood Cx, Ucx                 Subjective: Patient was a code sepsis yesterday, found to have sepsis 2/2 UTI. His Na went to 129 from 131. Patient states he felt some chills last night, denies any weakness, dizziness, no change in appetite either.   Chart Notes, Work list Manager, and fingersticks reviewed.    Review of Systems:  Constitutional: + fever and chills  Eyes: No blurry vision  Neuro: No tremors  HEENT: No pain  Cardiovascular: No chest pain, palpitations  Respiratory: No SOB, no cough  GI: No nausea, vomiting, abdominal pain  : No dysuria  Skin: no rash  Psych: no depression  Endocrine: no polyuria, polydipsia  Hem/lymph: no swelling    Medications (Standing):  cefTRIAXone   IVPB      cefTRIAXone   IVPB 1000 milliGRAM(s) IV Intermittent every 24 hours  chlorhexidine 2% Cloths 1 Application(s) Topical <User Schedule>  enoxaparin Injectable 40 milliGRAM(s) SubCutaneous every 24 hours  fluticasone propionate 50 MICROgram(s)/spray Nasal Spray 1 Spray(s) Both Nostrils two times a day  hydrocortisone 25 milliGRAM(s) Oral two times a day  oxymetazoline 0.05% Nasal Spray 1 Spray(s) Both Nostrils two times a day  polyethylene glycol 3350 17 Gram(s) Oral at bedtime  senna 2 Tablet(s) Oral at bedtime    Medications (prn):  acetaminophen     Tablet .. 650 milliGRAM(s) Oral every 6 hours PRN Temp greater or equal to 38C (100.4F), Mild Pain (1 - 3)  OLANZapine 2.5 milliGRAM(s) Oral once PRN agitation  ondansetron    Tablet 4 milliGRAM(s) Oral daily PRN Nausea and/or Vomiting      Physical exam:  VITALS: T(C): 37.8 (06-25-25 @ 12:55)  T(F): 100 (06-25-25 @ 12:55), Max: 102 (06-24-25 @ 15:19)  HR: 104 (06-25-25 @ 12:55) (99 - 122)  BP: 126/77 (06-25-25 @ 12:55) (86/57 - 141/81)  RR:  (17 - 18)  SpO2:  (95% - 97%)  Wt(kg): --    General: NAD,   HEENT:  Atraumatic, Normocephalic, drymucous membranes  Thyroid: Normal size, no palpable nodules  Respiratory: Clear to auscultation bilaterally; No rales, rhonchi, wheezing  CVS: Regular rate and rhythm; No murmurs; no peripheral edema  GI: Soft, nontender, non distended, +ve abdominal obesity  Extremities: +ve peripheral pulses, -ve pedal edema  Skin: Dry, intact, No rashes or lesions  Psych: Alert and oriented x 3    CAPILLARY BLOOD GLUCOSE      POCT Blood Glucose.: 145 mg/dL (24 Jun 2025 18:27)    06-25    129[L]  |  97  |  14  ----------------------------<  100[H]  3.7   |  27  |  0.78    eGFR: 99    Ca    8.5      06-25  Mg     1.8     06-24  Phos  3.8     06-24    TPro  6.0  /  Alb  2.7[L]  /  TBili  0.5  /  DBili  x   /  AST  15  /  ALT  29  /  AlkPhos  56  06-24    Thyroid Function Tests:  06-20 @ 03:53 TSH 1.88 FreeT4 1.6 T3 -- Anti TPO -- Anti Thyroglobulin Ab -- TSI --        Assessment and Plan:      65y male with h/o Chronic secondary adrenal insufficiency, Subclinical Hypothyroidism, HTN, HLD, Depression (not on any meds), Melanoma, presented with 4-5 days of NBNB vomiting. Found to have Na 108. Admitted to ICU for severe hyponatremia. Endocrinology consulted for Adrenal crisis. 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   Please give Hydrocortisone 25mg IV once stat and give 50mg IV in the evening  C/w Hydrocortisone 50mg IV BID for next 3 days, then  Start Hydrocortisone 25mg IV BID for 3 days  Will recommend further taper depending on patients response  If Na >130, no fever and pt stable, change it to PO  Monitor Na  F/u blood Cx, Ucx

## 2025-06-25 NOTE — CONSULT NOTE ADULT - ASSESSMENT
1. Hyponatremia with unknown duration most likely chronic. Pt is clinically euvolemic. Multifactorial from High ADH state and Adrenal insuff.  -Na stable at 125; check Na with urine lytes in the evening and if drops then then give 3% saline at 25cc/hr over 4hrs.   -add on uric acid and cortisol in am.   -continue Fluid restriction 800ml to 1L/day.   -Check Seurm Na bid. Monitor I/O's daily. Avoid overcorrection of NA (8-10meq/day)  2. HTN:   -bp is low   -hold bp meds  -monitor bp.  3. h/o Adrenal inuff:  -changed to iv hydrocortisone  -Endo consulted.   4. Hypophosphatemia:  -give iv Naphos  -monitor phos    
UTI  Fevers  Leukocytosis      Plan - Cont Rocephin 1gm iv q24hrs pending sensitivities of organism.

## 2025-06-25 NOTE — PROGRESS NOTE ADULT - SUBJECTIVE AND OBJECTIVE BOX
NP Note discussed with  Primary Attending    INTERVAL HPI/OVERNIGHT EVENTS: seen at bedside, low grade temp this am 100.2F then afebrile     MEDICATIONS  (STANDING):  cefTRIAXone   IVPB      cefTRIAXone   IVPB 1000 milliGRAM(s) IV Intermittent every 24 hours  chlorhexidine 2% Cloths 1 Application(s) Topical <User Schedule>  enoxaparin Injectable 40 milliGRAM(s) SubCutaneous every 24 hours  fluticasone propionate 50 MICROgram(s)/spray Nasal Spray 1 Spray(s) Both Nostrils two times a day  hydrocortisone 25 milliGRAM(s) Oral two times a day  oxymetazoline 0.05% Nasal Spray 1 Spray(s) Both Nostrils two times a day  polyethylene glycol 3350 17 Gram(s) Oral at bedtime  senna 2 Tablet(s) Oral at bedtime    MEDICATIONS  (PRN):  acetaminophen     Tablet .. 650 milliGRAM(s) Oral every 6 hours PRN Temp greater or equal to 38C (100.4F), Mild Pain (1 - 3)  OLANZapine 2.5 milliGRAM(s) Oral once PRN agitation  ondansetron    Tablet 4 milliGRAM(s) Oral daily PRN Nausea and/or Vomiting      __________________________________________________  REVIEW OF SYSTEMS:    CONSTITUTIONAL: No fever,   EYES: no acute visual disturbances  NECK: No pain or stiffness  RESPIRATORY: No cough; No shortness of breath  CARDIOVASCULAR: No chest pain, no palpitations  GASTROINTESTINAL: No pain. No nausea or vomiting; No diarrhea   NEUROLOGICAL: No headache or numbness, no tremors  MUSCULOSKELETAL: No joint pain, no muscle pain  GENITOURINARY: no dysuria, no frequency, no hesitancy  PSYCHIATRY: no depression , no anxiety  ALL OTHER  ROS negative        Vital Signs Last 24 Hrs  T(C): 36.9 (25 Jun 2025 08:05), Max: 38.9 (24 Jun 2025 15:19)  T(F): 98.4 (25 Jun 2025 08:05), Max: 102 (24 Jun 2025 15:19)  HR: 99 (25 Jun 2025 05:25) (99 - 122)  BP: 141/81 (25 Jun 2025 05:25) (86/57 - 141/81)  BP(mean): 101 (25 Jun 2025 05:25) (86 - 101)  RR: 18 (25 Jun 2025 05:25) (17 - 18)  SpO2: 96% (25 Jun 2025 05:25) (95% - 98%)    Parameters below as of 25 Jun 2025 05:25  Patient On (Oxygen Delivery Method): room air        ________________________________________________  PHYSICAL EXAM:  GENERAL: NAD  HEENT: Normocephalic;  conjunctivae and sclerae clear; moist mucous membranes;   NECK : supple  CHEST/LUNG: Clear to auscultation bilaterally with good air entry   HEART: S1 S2  regular; no murmurs, gallops or rubs  ABDOMEN: Soft, Nontender, Nondistended; Bowel sounds present  EXTREMITIES: no cyanosis; no edema; no calf tenderness  SKIN: warm and dry; no rash  NERVOUS SYSTEM:  Awake and alert; Oriented  to place, person and time ; no new deficits    _________________________________________________  LABS:                        11.9   14.80 )-----------( 224      ( 25 Jun 2025 08:06 )             32.4     06-25    129[L]  |  97  |  14  ----------------------------<  100[H]  3.7   |  27  |  0.78    Ca    8.5      25 Jun 2025 08:06  Phos  3.8     06-24  Mg     1.8     06-24    TPro  6.0  /  Alb  2.7[L]  /  TBili  0.5  /  DBili  x   /  AST  15  /  ALT  29  /  AlkPhos  56  06-24      Urinalysis Basic - ( 25 Jun 2025 08:06 )    Color: x / Appearance: x / SG: x / pH: x  Gluc: 100 mg/dL / Ketone: x  / Bili: x / Urobili: x   Blood: x / Protein: x / Nitrite: x   Leuk Esterase: x / RBC: x / WBC x   Sq Epi: x / Non Sq Epi: x / Bacteria: x      CAPILLARY BLOOD GLUCOSE    POCT Blood Glucose.: 145 mg/dL (24 Jun 2025 18:27)  RADIOLOGY & ADDITIONAL TESTS:    Imaging  Reviewed:  YES  < from: Xray Chest 1 View- PORTABLE-Urgent (Xray Chest 1 View- PORTABLE-Urgent .) (06.24.25 @ 17:15) >    ACC: [id number] EXAM:  XR CHEST PORTABLE URGENT 1V   ORDERED BY: BHARGAV KEITA     PROCEDURE DATE:  06/24/2025          INTERPRETATION:  EXAM:XR CHEST URGENT.     CLINICAL INDICATION: tachycardia, fever .     TECHNIQUE: 2 portable upright AP views.     PRIOR EXAM: 09/28/2018.     FINDINGS:     Visualized lung fields are clear. Magnified cardiac silhouette is   unremarkable. There is no significant bony abnormality.      IMPRESSION:    No acute lung disease.    --- End of Report ---            BRANDON BATES MD; Attending Radiologist  This document has been electronically signed. Jun 25 2025 10:50AM    < end of copied text >    < from: CT Chest w/ IV Cont (06.18.25 @ 14:22) >    ACC: [id number] EXAM:  CT ABDOMEN AND PELVIS IC   ORDERED BY: ADAN BURROUGHS     ACC: [id number] EXAM:  CT CHEST IC   ORDERED BY: ADAN BURROUGHS     PROCEDURE DATE:  06/18/2025          INTERPRETATION:  CLINICAL INFORMATION: Nausea and failure to thrive.   History of melanoma.    COMPARISON: 6/12/2023    CONTRAST/COMPLICATIONS:  IV Contrast: IV contrast documented in unlinked concurrent exam   (accession [id number]), Omnipaque 350 (accession [id number])  90 cc   administered   10 cc discarded  Oral Contrast: NONE.    PROCEDURE:  CT of the Chest, Abdomen and Pelvis was performed.  Sagittal and coronal reformats were performed.    FINDINGS:  CHEST:  LUNGS AND LARGE AIRWAYS: Patent central airways. Small bilateral   atelectasis.  PLEURA: 1.3 cm pleural-based cystic structure in the posterior left upper   lung zone (4-22), unchanged since 8/18/2018.  VESSELS: Mild ectasia of the ascending aorta at 3.7 cm in diameter. No   aortic dissection.  HEART: Heart size is normal. No pericardial effusion.  MEDIASTINUM AND LEONA: No lymphadenopathy.  CHEST WALL AND LOWER NECK: Within normal limits.    ABDOMEN AND PELVIS:  LIVER: Within normal limits.  BILE DUCTS: Normal caliber.  GALLBLADDER: Within normal limits.  SPLEEN: Within normal limits.  PANCREAS: Within normal limits.  ADRENALS: Within normal limits.  KIDNEYS/URETERS: Bilateral renal cysts.    BLADDER: Within normal limits.  REPRODUCTIVE ORGANS: The prostate is mildly enlarged    BOWEL: No bowel obstruction. Visualized appendix appears unremarkable.   Collapse mild hiatal hernia versus distal esophageal mural thickening.   EGD may be pursued for further evaluation. Possible focal mural   thickening at the cecum. Colonoscopy may be pursued to rule out colon   cancer. Moderate to large amount of stool in the rectum.  PERITONEUM/RETROPERITONEUM: Within normal limits.  VESSELS: Within normal limits.  LYMPH NODES: No lymphadenopathy.  ABDOMINAL WALL: Small fat-containing umbilical hernia.  BONES: Mild degenerative spondylosis    IMPRESSION:  No bowel obstruction. Visualized appendix appears unremarkable. Collapse   mild hiatal hernia versus distal esophageal mural thickening. EGD may be   pursued for further evaluation. Possible focal mural thickening at the   cecum. Colonoscopy may be pursued to rule out colon cancer. Moderate to   large amount of stool in the rectum.        --- End of Report ---            JUNO AYALA MD; Attending Radiologist  This document has been electronically signed. Jun 18 2025  3:24PM    < end of copied text >  Consultant(s) Notes Reviewed:   YES      Plan of care was discussed with patient and /or primary care giver; all questions and concerns were addressed

## 2025-06-25 NOTE — PROGRESS NOTE ADULT - PROBLEM SELECTOR PLAN 2
presenting with generalized weakness, nausea, vomiting   hyponatremia likely SIADH 2/2 possible cortisol crisis  takes hydrocortisone 10mg BID as per patient  c/w hydrocort taper as per endo recs  Taper to 25q12 for 2 days last day tomorrow 6/25 then  Taper to 15q12 for 2 days, then  Taper to 10q12 for 2 days and continue lifelong  Uric acid 3.2  - f/u free cortisol level, sent  - Endo consulted: Dr Syed  - Nephro consulted: Dr Suresh presenting with generalized weakness, nausea, vomiting   hyponatremia likely SIADH 2/2 possible cortisol crisis  takes hydrocortisone 10mg BID as per patient  s/p taper dose hydro but resumed IV hydrocortisone in the setting sepsis, 50mg BID starting today.   Uric acid 3.2  - f/u free cortisol level, sent  - Endo consulted: Dr Syed  - Nephro consulted: Dr Suresh presenting with generalized weakness, nausea, vomiting   hyponatremia likely SIADH 2/2 possible cortisol crisis  takes hydrocortisone 10mg BID as per patient  s/p taper dose hydro but resumed IV hydrocortisone in the setting sepsis, 50mg BID starting today.   Uric acid 3.2  - f/u free cortisol level, sent  - Endo consulted: Dr Syed  - Nephro consulted: Dr Suresh  - Na 129, started 1g sodium tab TID per nephro

## 2025-06-25 NOTE — CONSULT NOTE ADULT - SUBJECTIVE AND OBJECTIVE BOX
HPI:  65M PMH HTN, HLD, depression, and melanoma presenting with nausea and vomiting x 4 days. He states that he had URI symptoms one week ago and since then has had decreased appetite. He reported that he had nausea and vomiting since Saturday, where he had 3-5 episodes of NBNB vomiting. He has had poor po intake since Saturday due to being nervous about feeling nauseous after, but has been drinking two to three 8 oz cups of water each day. He denies any dizziness, lightheadedness, blurry vision, or abdominal pain. He does report that he noticed his speech was slower than normal and that he is more lethargic. He denies missing any doses of his medications, including his hydrocortisone 10mg BID. He denies fevers, chills, CP, SOB, diarrhea, dysuria, numbness/tingling/weakness in extremities. He lives at home alone and ambulates independently.                 PAST MEDICAL & SURGICAL HISTORY:  Depression      No significant past surgical history          No Known Allergies      Meds:  acetaminophen     Tablet .. 650 milliGRAM(s) Oral every 6 hours PRN  cefTRIAXone   IVPB      cefTRIAXone   IVPB 1000 milliGRAM(s) IV Intermittent every 24 hours  chlorhexidine 2% Cloths 1 Application(s) Topical <User Schedule>  enoxaparin Injectable 40 milliGRAM(s) SubCutaneous every 24 hours  fluticasone propionate 50 MICROgram(s)/spray Nasal Spray 1 Spray(s) Both Nostrils two times a day  hydrocortisone sodium succinate Injectable 50 milliGRAM(s) IV Push every 12 hours  OLANZapine 2.5 milliGRAM(s) Oral once PRN  ondansetron    Tablet 4 milliGRAM(s) Oral daily PRN  oxymetazoline 0.05% Nasal Spray 1 Spray(s) Both Nostrils two times a day  polyethylene glycol 3350 17 Gram(s) Oral at bedtime  senna 2 Tablet(s) Oral at bedtime      SOCIAL HISTORY:  Smoker:  YES / NO        PACK YEARS:                         WHEN QUIT?  ETOH use:  YES / NO               FREQUENCY / QUANTITY:  Ilicit Drug use:  YES / NO  Occupation:  Assisted device use (Cane / Walker):  Live with:    FAMILY HISTORY:  No pertinent family history in first degree relatives        VITALS:  Vital Signs Last 24 Hrs  T(C): 37.2 (25 Jun 2025 14:26), Max: 38.3 (24 Jun 2025 18:15)  T(F): 98.9 (25 Jun 2025 14:26), Max: 100.9 (24 Jun 2025 18:15)  HR: 104 (25 Jun 2025 14:26) (99 - 122)  BP: 126/79 (25 Jun 2025 14:26) (86/57 - 141/81)  BP(mean): 102 (25 Jun 2025 14:25) (86 - 102)  RR: 18 (25 Jun 2025 14:26) (17 - 18)  SpO2: 97% (25 Jun 2025 14:26) (95% - 97%)    Parameters below as of 25 Jun 2025 14:26  Patient On (Oxygen Delivery Method): room air        LABS/DIAGNOSTIC TESTS:                          11.9   14.80 )-----------( 224      ( 25 Jun 2025 08:06 )             32.4     WBC Count: 14.80 K/uL (06-25 @ 08:06)  WBC Count: 14.27 K/uL (06-24 @ 17:40)  WBC Count: 13.08 K/uL (06-24 @ 06:00)  WBC Count: 8.83 K/uL (06-23 @ 05:30)      06-25    129[L]  |  97  |  14  ----------------------------<  100[H]  3.7   |  27  |  0.78    Ca    8.5      25 Jun 2025 08:06  Phos  3.8     06-24  Mg     1.8     06-24    TPro  6.0  /  Alb  2.7[L]  /  TBili  0.5  /  DBili  x   /  AST  15  /  ALT  29  /  AlkPhos  56  06-24      Urine Microscopic-Add On (NC) (06.24.25 @ 16:20)   White Blood Cell - Urine: 45 /HPF  Red Blood Cell - Urine: 8 /HPF  Bacteria: Too Numerous to count /HPF  Squamous Epithelial Cells: Present    Urinalysis with Rflx Culture (06.24.25 @ 16:20)   Urine Appearance: Clear  Color: Yellow  Specific Gravity: 1.019  pH Urine: 5.5  Protein, Urine: Negative mg/dL  Glucose Qualitative, Urine: Negative mg/dL  Ketone , Urine: Negative mg/dL  Blood, Urine: Moderate  Bilirubin: Negative  Urobilinogen: 1.0 mg/dL  Leukocyte Esterase Concentration: Small  Nitrite: Positive      LIVER FUNCTIONS - ( 24 Jun 2025 17:40 )  Alb: 2.7 g/dL / Pro: 6.0 g/dL / ALK PHOS: 56 U/L / ALT: 29 U/L DA / AST: 15 U/L / GGT: x                 LACTATE:Lactate, Blood: 1.1 mmol/L (06-24 @ 22:08)      ABG -     CULTURES:   Clean Catch  06-24 @ 16:20   >100,000 CFU/ml Gram Negative Rods  --  --          RADIOLOGY:< from: Xray Chest 1 View- PORTABLE-Urgent (Xray Chest 1 View- PORTABLE-Urgent .) (06.24.25 @ 17:15) >  ACC: [id number] EXAM:  XR CHEST PORTABLE URGENT 1V   ORDERED BY: BHARGAV KEITA     PROCEDURE DATE:  06/24/2025          INTERPRETATION:  EXAM:XR CHEST URGENT.     CLINICAL INDICATION: tachycardia, fever .     TECHNIQUE: 2 portable upright AP views.     PRIOR EXAM: 09/28/2018.     FINDINGS:     Visualized lung fields are clear. Magnified cardiac silhouette is   unremarkable. There is no significant bony abnormality.      IMPRESSION:    No acute lung disease.    --- End of Report ---            BRANDON BATES MD; Attending Radiologist  This document has been electronically signed. Jun 25 2025 10:50AM    < end of copied text >    ---------------------------------------------------------------------------------------------------  ACC: [id number] EXAM:  CT ABDOMEN AND PELVIS IC   ORDERED BY: ADAN BURROUGHS     ACC: [id number] EXAM:  CT CHEST IC   ORDERED BY: ADAN BURROUGHS     PROCEDURE DATE:  06/18/2025          INTERPRETATION:  CLINICAL INFORMATION: Nausea and failure to thrive.   History of melanoma.    COMPARISON: 6/12/2023    CONTRAST/COMPLICATIONS:  IV Contrast: IV contrast documented in unlinked concurrent exam   (accession [id number]), Omnipaque 350 (accession [id number])  90 cc   administered   10 cc discarded  Oral Contrast: NONE.    PROCEDURE:  CT of the Chest, Abdomen and Pelvis was performed.  Sagittal and coronal reformats were performed.    FINDINGS:  CHEST:  LUNGS AND LARGE AIRWAYS: Patent central airways. Small bilateral   atelectasis.  PLEURA: 1.3 cm pleural-based cystic structure in the posterior left upper   lung zone (4-22), unchanged since 8/18/2018.  VESSELS: Mild ectasia of the ascending aorta at 3.7 cm in diameter. No   aortic dissection.  HEART: Heart size is normal. No pericardial effusion.  MEDIASTINUM AND LEONA: No lymphadenopathy.  CHEST WALL AND LOWER NECK: Within normal limits.    ABDOMEN AND PELVIS:  LIVER: Within normal limits.  BILE DUCTS: Normal caliber.  GALLBLADDER: Within normal limits.  SPLEEN: Within normal limits.  PANCREAS: Within normal limits.  ADRENALS: Within normal limits.  KIDNEYS/URETERS: Bilateral renal cysts.    BLADDER: Within normal limits.  REPRODUCTIVE ORGANS: The prostate is mildly enlarged    BOWEL: No bowel obstruction. Visualized appendix appears unremarkable.   Collapse mild hiatal hernia versus distal esophageal mural thickening.   EGD may be pursued for further evaluation. Possible focal mural   thickening at the cecum. Colonoscopy may be pursued to rule out colon   cancer. Moderate to large amount of stool in the rectum.  PERITONEUM/RETROPERITONEUM: Within normal limits.  VESSELS: Within normal limits.  LYMPH NODES: No lymphadenopathy.  ABDOMINAL WALL: Small fat-containing umbilical hernia.  BONES: Mild degenerative spondylosis    IMPRESSION:  No bowel obstruction. Visualized appendix appears unremarkable. Collapse   mild hiatal hernia versus distal esophageal mural thickening. EGD may be   pursued for further evaluation. Possible focal mural thickening at the   cecum. Colonoscopy may be pursued to rule out colon cancer. Moderate to   large amount of stool in the rectum.        --- End of Report ---            JUNO AYALA MD; Attending Radiologist  This document has been electronically signed. Jun 18 2025  3:24PM    < end of copied text >      ROS  [  ] UNABLE TO ELICIT               HPI:  65M PMH HTN, HLD, depression, and melanoma presenting with nausea and vomiting x 4 days. He states that he had URI symptoms one week ago and since then has had decreased appetite. He reported that he had nausea and vomiting since Saturday, where he had 3-5 episodes of NBNB vomiting. He has had poor po intake since Saturday due to being nervous about feeling nauseous after, but has been drinking two to three 8 oz cups of water each day. He denies any dizziness, lightheadedness, blurry vision, or abdominal pain. He does report that he noticed his speech was slower than normal and that he is more lethargic. He denies missing any doses of his medications, including his hydrocortisone 10mg BID. He denies fevers, chills, CP, SOB, diarrhea, dysuria, numbness/tingling/weakness in extremities. He lives at home alone and ambulates independently.         History as above, asked to see this patient who was admitted about 1 week ago, with hyponatremia and then developed a high grade fever ( code sepsis ), he is a poor historian but states that he developed some dysuria and urgency           PAST MEDICAL & SURGICAL HISTORY:  Depression      No significant past surgical history          No Known Allergies      Meds:  acetaminophen     Tablet .. 650 milliGRAM(s) Oral every 6 hours PRN  cefTRIAXone   IVPB      cefTRIAXone   IVPB 1000 milliGRAM(s) IV Intermittent every 24 hours  chlorhexidine 2% Cloths 1 Application(s) Topical <User Schedule>  enoxaparin Injectable 40 milliGRAM(s) SubCutaneous every 24 hours  fluticasone propionate 50 MICROgram(s)/spray Nasal Spray 1 Spray(s) Both Nostrils two times a day  hydrocortisone sodium succinate Injectable 50 milliGRAM(s) IV Push every 12 hours  OLANZapine 2.5 milliGRAM(s) Oral once PRN  ondansetron    Tablet 4 milliGRAM(s) Oral daily PRN  oxymetazoline 0.05% Nasal Spray 1 Spray(s) Both Nostrils two times a day  polyethylene glycol 3350 17 Gram(s) Oral at bedtime  senna 2 Tablet(s) Oral at bedtime      SOCIAL HISTORY:  Smoker:  YES / NO        PACK YEARS:                         WHEN QUIT?  ETOH use:  YES / NO               FREQUENCY / QUANTITY:  Ilicit Drug use:  YES / NO  Occupation:  Assisted device use (Cane / Walker):  Live with:    FAMILY HISTORY:  No pertinent family history in first degree relatives        VITALS:  Vital Signs Last 24 Hrs  T(C): 37.2 (25 Jun 2025 14:26), Max: 38.3 (24 Jun 2025 18:15)  T(F): 98.9 (25 Jun 2025 14:26), Max: 100.9 (24 Jun 2025 18:15)  HR: 104 (25 Jun 2025 14:26) (99 - 122)  BP: 126/79 (25 Jun 2025 14:26) (86/57 - 141/81)  BP(mean): 102 (25 Jun 2025 14:25) (86 - 102)  RR: 18 (25 Jun 2025 14:26) (17 - 18)  SpO2: 97% (25 Jun 2025 14:26) (95% - 97%)    Parameters below as of 25 Jun 2025 14:26  Patient On (Oxygen Delivery Method): room air        LABS/DIAGNOSTIC TESTS:                          11.9   14.80 )-----------( 224      ( 25 Jun 2025 08:06 )             32.4     WBC Count: 14.80 K/uL (06-25 @ 08:06)  WBC Count: 14.27 K/uL (06-24 @ 17:40)  WBC Count: 13.08 K/uL (06-24 @ 06:00)  WBC Count: 8.83 K/uL (06-23 @ 05:30)      06-25    129[L]  |  97  |  14  ----------------------------<  100[H]  3.7   |  27  |  0.78    Ca    8.5      25 Jun 2025 08:06  Phos  3.8     06-24  Mg     1.8     06-24    TPro  6.0  /  Alb  2.7[L]  /  TBili  0.5  /  DBili  x   /  AST  15  /  ALT  29  /  AlkPhos  56  06-24      Urine Microscopic-Add On (NC) (06.24.25 @ 16:20)   White Blood Cell - Urine: 45 /HPF  Red Blood Cell - Urine: 8 /HPF  Bacteria: Too Numerous to count /HPF  Squamous Epithelial Cells: Present    Urinalysis with Rflx Culture (06.24.25 @ 16:20)   Urine Appearance: Clear  Color: Yellow  Specific Gravity: 1.019  pH Urine: 5.5  Protein, Urine: Negative mg/dL  Glucose Qualitative, Urine: Negative mg/dL  Ketone , Urine: Negative mg/dL  Blood, Urine: Moderate  Bilirubin: Negative  Urobilinogen: 1.0 mg/dL  Leukocyte Esterase Concentration: Small  Nitrite: Positive      LIVER FUNCTIONS - ( 24 Jun 2025 17:40 )  Alb: 2.7 g/dL / Pro: 6.0 g/dL / ALK PHOS: 56 U/L / ALT: 29 U/L DA / AST: 15 U/L / GGT: x                 LACTATE:Lactate, Blood: 1.1 mmol/L (06-24 @ 22:08)      ABG -     CULTURES:   Clean Catch  06-24 @ 16:20   >100,000 CFU/ml Gram Negative Rods  --  --          RADIOLOGY:< from: Xray Chest 1 View- PORTABLE-Urgent (Xray Chest 1 View- PORTABLE-Urgent .) (06.24.25 @ 17:15) >  ACC: [id number] EXAM:  XR CHEST PORTABLE URGENT 1V   ORDERED BY: BHARGAV KEITA     PROCEDURE DATE:  06/24/2025          INTERPRETATION:  EXAM:XR CHEST URGENT.     CLINICAL INDICATION: tachycardia, fever .     TECHNIQUE: 2 portable upright AP views.     PRIOR EXAM: 09/28/2018.     FINDINGS:     Visualized lung fields are clear. Magnified cardiac silhouette is   unremarkable. There is no significant bony abnormality.      IMPRESSION:    No acute lung disease.    --- End of Report ---            BRANDON BATES MD; Attending Radiologist  This document has been electronically signed. Jun 25 2025 10:50AM    < end of copied text >    ---------------------------------------------------------------------------------------------------  ACC: [id number] EXAM:  CT ABDOMEN AND PELVIS IC   ORDERED BY: ADAN BURROUGHS     ACC: [id number] EXAM:  CT CHEST IC   ORDERED BY: ADAN BURROUGHS     PROCEDURE DATE:  06/18/2025          INTERPRETATION:  CLINICAL INFORMATION: Nausea and failure to thrive.   History of melanoma.    COMPARISON: 6/12/2023    CONTRAST/COMPLICATIONS:  IV Contrast: IV contrast documented in unlinked concurrent exam   (accession [id number]), Omnipaque 350 (accession [id number])  90 cc   administered   10 cc discarded  Oral Contrast: NONE.    PROCEDURE:  CT of the Chest, Abdomen and Pelvis was performed.  Sagittal and coronal reformats were performed.    FINDINGS:  CHEST:  LUNGS AND LARGE AIRWAYS: Patent central airways. Small bilateral   atelectasis.  PLEURA: 1.3 cm pleural-based cystic structure in the posterior left upper   lung zone (4-22), unchanged since 8/18/2018.  VESSELS: Mild ectasia of the ascending aorta at 3.7 cm in diameter. No   aortic dissection.  HEART: Heart size is normal. No pericardial effusion.  MEDIASTINUM AND LEONA: No lymphadenopathy.  CHEST WALL AND LOWER NECK: Within normal limits.    ABDOMEN AND PELVIS:  LIVER: Within normal limits.  BILE DUCTS: Normal caliber.  GALLBLADDER: Within normal limits.  SPLEEN: Within normal limits.  PANCREAS: Within normal limits.  ADRENALS: Within normal limits.  KIDNEYS/URETERS: Bilateral renal cysts.    BLADDER: Within normal limits.  REPRODUCTIVE ORGANS: The prostate is mildly enlarged    BOWEL: No bowel obstruction. Visualized appendix appears unremarkable.   Collapse mild hiatal hernia versus distal esophageal mural thickening.   EGD may be pursued for further evaluation. Possible focal mural   thickening at the cecum. Colonoscopy may be pursued to rule out colon   cancer. Moderate to large amount of stool in the rectum.  PERITONEUM/RETROPERITONEUM: Within normal limits.  VESSELS: Within normal limits.  LYMPH NODES: No lymphadenopathy.  ABDOMINAL WALL: Small fat-containing umbilical hernia.  BONES: Mild degenerative spondylosis    IMPRESSION:  No bowel obstruction. Visualized appendix appears unremarkable. Collapse   mild hiatal hernia versus distal esophageal mural thickening. EGD may be   pursued for further evaluation. Possible focal mural thickening at the   cecum. Colonoscopy may be pursued to rule out colon cancer. Moderate to   large amount of stool in the rectum.        --- End of Report ---            JUNO AYALA MD; Attending Radiologist  This document has been electronically signed. Jun 18 2025  3:24PM    < end of copied text >      ROS  [  ] UNABLE TO ELICIT               HPI:  65M PMH HTN, HLD, depression, and melanoma presenting with nausea and vomiting x 4 days. He states that he had URI symptoms one week ago and since then has had decreased appetite. He reported that he had nausea and vomiting since Saturday, where he had 3-5 episodes of NBNB vomiting. He has had poor po intake since Saturday due to being nervous about feeling nauseous after, but has been drinking two to three 8 oz cups of water each day. He denies any dizziness, lightheadedness, blurry vision, or abdominal pain. He does report that he noticed his speech was slower than normal and that he is more lethargic. He denies missing any doses of his medications, including his hydrocortisone 10mg BID. He denies fevers, chills, CP, SOB, diarrhea, dysuria, numbness/tingling/weakness in extremities. He lives at home alone and ambulates independently.         History as above, asked to see this patient who was admitted about 1 week ago, with hyponatremia and then developed a high grade fever ( code sepsis ), he is a poor historian but states that he developed some dysuria and urgency and so started on Rocephin empirically for a possible UTI and now his urine culture is growing out a gram negative jair. He denies any nausea, vomiting , diarrhea, abdominal pain or even urinary symptoms now, he has no coughing or SOB either.          PAST MEDICAL & SURGICAL HISTORY:  Depression      No significant past surgical history          No Known Allergies      Meds:  acetaminophen     Tablet .. 650 milliGRAM(s) Oral every 6 hours PRN  cefTRIAXone   IVPB      cefTRIAXone   IVPB 1000 milliGRAM(s) IV Intermittent every 24 hours  chlorhexidine 2% Cloths 1 Application(s) Topical <User Schedule>  enoxaparin Injectable 40 milliGRAM(s) SubCutaneous every 24 hours  fluticasone propionate 50 MICROgram(s)/spray Nasal Spray 1 Spray(s) Both Nostrils two times a day  hydrocortisone sodium succinate Injectable 50 milliGRAM(s) IV Push every 12 hours  OLANZapine 2.5 milliGRAM(s) Oral once PRN  ondansetron    Tablet 4 milliGRAM(s) Oral daily PRN  oxymetazoline 0.05% Nasal Spray 1 Spray(s) Both Nostrils two times a day  polyethylene glycol 3350 17 Gram(s) Oral at bedtime  senna 2 Tablet(s) Oral at bedtime      SOCIAL HISTORY:  Smoker:  no  ETOH use: no    FAMILY HISTORY:  No pertinent family history in first degree relatives        VITALS:  Vital Signs Last 24 Hrs  T(C): 37.2 (25 Jun 2025 14:26), Max: 38.3 (24 Jun 2025 18:15)  T(F): 98.9 (25 Jun 2025 14:26), Max: 100.9 (24 Jun 2025 18:15)  HR: 104 (25 Jun 2025 14:26) (99 - 122)  BP: 126/79 (25 Jun 2025 14:26) (86/57 - 141/81)  BP(mean): 102 (25 Jun 2025 14:25) (86 - 102)  RR: 18 (25 Jun 2025 14:26) (17 - 18)  SpO2: 97% (25 Jun 2025 14:26) (95% - 97%)    Parameters below as of 25 Jun 2025 14:26  Patient On (Oxygen Delivery Method): room air        LABS/DIAGNOSTIC TESTS:                          11.9   14.80 )-----------( 224      ( 25 Jun 2025 08:06 )             32.4     WBC Count: 14.80 K/uL (06-25 @ 08:06)  WBC Count: 14.27 K/uL (06-24 @ 17:40)  WBC Count: 13.08 K/uL (06-24 @ 06:00)  WBC Count: 8.83 K/uL (06-23 @ 05:30)      06-25    129[L]  |  97  |  14  ----------------------------<  100[H]  3.7   |  27  |  0.78    Ca    8.5      25 Jun 2025 08:06  Phos  3.8     06-24  Mg     1.8     06-24    TPro  6.0  /  Alb  2.7[L]  /  TBili  0.5  /  DBili  x   /  AST  15  /  ALT  29  /  AlkPhos  56  06-24      Urine Microscopic-Add On (NC) (06.24.25 @ 16:20)   White Blood Cell - Urine: 45 /HPF  Red Blood Cell - Urine: 8 /HPF  Bacteria: Too Numerous to count /HPF  Squamous Epithelial Cells: Present    Urinalysis with Rflx Culture (06.24.25 @ 16:20)   Urine Appearance: Clear  Color: Yellow  Specific Gravity: 1.019  pH Urine: 5.5  Protein, Urine: Negative mg/dL  Glucose Qualitative, Urine: Negative mg/dL  Ketone , Urine: Negative mg/dL  Blood, Urine: Moderate  Bilirubin: Negative  Urobilinogen: 1.0 mg/dL  Leukocyte Esterase Concentration: Small  Nitrite: Positive      LIVER FUNCTIONS - ( 24 Jun 2025 17:40 )  Alb: 2.7 g/dL / Pro: 6.0 g/dL / ALK PHOS: 56 U/L / ALT: 29 U/L DA / AST: 15 U/L / GGT: x                 LACTATE:Lactate, Blood: 1.1 mmol/L (06-24 @ 22:08)      ABG -     CULTURES:   Clean Catch  06-24 @ 16:20   >100,000 CFU/ml Gram Negative Rods  --  --          RADIOLOGY:< from: Xray Chest 1 View- PORTABLE-Urgent (Xray Chest 1 View- PORTABLE-Urgent .) (06.24.25 @ 17:15) >  ACC: [id number] EXAM:  XR CHEST PORTABLE URGENT 1V   ORDERED BY: BHARGAV KEITA     PROCEDURE DATE:  06/24/2025          INTERPRETATION:  EXAM:XR CHEST URGENT.     CLINICAL INDICATION: tachycardia, fever .     TECHNIQUE: 2 portable upright AP views.     PRIOR EXAM: 09/28/2018.     FINDINGS:     Visualized lung fields are clear. Magnified cardiac silhouette is   unremarkable. There is no significant bony abnormality.      IMPRESSION:    No acute lung disease.    --- End of Report ---            BRANDON BATES MD; Attending Radiologist  This document has been electronically signed. Jun 25 2025 10:50AM    < end of copied text >    ---------------------------------------------------------------------------------------------------  ACC: [id number] EXAM:  CT ABDOMEN AND PELVIS IC   ORDERED BY: ADAN BURROUGHS     ACC: [id number] EXAM:  CT CHEST IC   ORDERED BY: ADAN BURROUGHS     PROCEDURE DATE:  06/18/2025          INTERPRETATION:  CLINICAL INFORMATION: Nausea and failure to thrive.   History of melanoma.    COMPARISON: 6/12/2023    CONTRAST/COMPLICATIONS:  IV Contrast: IV contrast documented in unlinked concurrent exam   (accession [id number]), Omnipaque 350 (accession [id number])  90 cc   administered   10 cc discarded  Oral Contrast: NONE.    PROCEDURE:  CT of the Chest, Abdomen and Pelvis was performed.  Sagittal and coronal reformats were performed.    FINDINGS:  CHEST:  LUNGS AND LARGE AIRWAYS: Patent central airways. Small bilateral   atelectasis.  PLEURA: 1.3 cm pleural-based cystic structure in the posterior left upper   lung zone (4-22), unchanged since 8/18/2018.  VESSELS: Mild ectasia of the ascending aorta at 3.7 cm in diameter. No   aortic dissection.  HEART: Heart size is normal. No pericardial effusion.  MEDIASTINUM AND LEONA: No lymphadenopathy.  CHEST WALL AND LOWER NECK: Within normal limits.    ABDOMEN AND PELVIS:  LIVER: Within normal limits.  BILE DUCTS: Normal caliber.  GALLBLADDER: Within normal limits.  SPLEEN: Within normal limits.  PANCREAS: Within normal limits.  ADRENALS: Within normal limits.  KIDNEYS/URETERS: Bilateral renal cysts.    BLADDER: Within normal limits.  REPRODUCTIVE ORGANS: The prostate is mildly enlarged    BOWEL: No bowel obstruction. Visualized appendix appears unremarkable.   Collapse mild hiatal hernia versus distal esophageal mural thickening.   EGD may be pursued for further evaluation. Possible focal mural   thickening at the cecum. Colonoscopy may be pursued to rule out colon   cancer. Moderate to large amount of stool in the rectum.  PERITONEUM/RETROPERITONEUM: Within normal limits.  VESSELS: Within normal limits.  LYMPH NODES: No lymphadenopathy.  ABDOMINAL WALL: Small fat-containing umbilical hernia.  BONES: Mild degenerative spondylosis    IMPRESSION:  No bowel obstruction. Visualized appendix appears unremarkable. Collapse   mild hiatal hernia versus distal esophageal mural thickening. EGD may be   pursued for further evaluation. Possible focal mural thickening at the   cecum. Colonoscopy may be pursued to rule out colon cancer. Moderate to   large amount of stool in the rectum.        --- End of Report ---            JUNO AYALA MD; Attending Radiologist  This document has been electronically signed. Jun 18 2025  3:24PM    < end of copied text >      ROS  [  ] UNABLE TO ELICIT

## 2025-06-25 NOTE — PROGRESS NOTE ADULT - ASSESSMENT
65M PMH HTN, HLD, depression, and melanoma presenting with nausea and vomiting x 4 days. Admitted to ICU for severe hyponatremia, likely 2/2 SIADH iso possible cortisol crisis. Endo following. Resolving, plan DC to [facility] 6/24, but cancelled as  code sepsis was called 6/24. work up in progress. ID following.

## 2025-06-25 NOTE — PROGRESS NOTE ADULT - SUBJECTIVE AND OBJECTIVE BOX
Patient is a 65y old  Male who presents with a chief complaint of severe hyponatremia, cortisol crisis (2025 18:52)    PATIENT IS SEEN AND EXAMINED IN MEDICAL FLOOR.  NEVIN [    ]    LISSETH [   ]      GT [   ]    ALLERGIES:  No Known Allergies      Daily     Daily Weight in k.4 (2025 05:25)    VITALS:    Vital Signs Last 24 Hrs  T(C): 36.9 (2025 08:05), Max: 38.9 (2025 15:19)  T(F): 98.4 (2025 08:05), Max: 102 (2025 15:19)  HR: 99 (2025 05:25) (99 - 122)  BP: 141/81 (2025 05:25) (86/57 - 141/81)  BP(mean): 101 (2025 05:25) (86 - 101)  RR: 18 (2025 05:25) (17 - 18)  SpO2: 96% (2025 05:25) (95% - 98%)    Parameters below as of 2025 05:25  Patient On (Oxygen Delivery Method): room air        LABS:    CBC Full  -  ( 2025 08:06 )  WBC Count : 14.80 K/uL  RBC Count : 3.82 M/uL  Hemoglobin : 11.9 g/dL  Hematocrit : 32.4 %  Platelet Count - Automated : 224 K/uL  Mean Cell Volume : 84.8 fl  Mean Cell Hemoglobin : 31.2 pg  Mean Cell Hemoglobin Concentration : 36.7 g/dL  Auto Neutrophil # : x  Auto Lymphocyte # : x  Auto Monocyte # : x  Auto Eosinophil # : x  Auto Basophil # : x  Auto Neutrophil % : x  Auto Lymphocyte % : x  Auto Monocyte % : x  Auto Eosinophil % : x  Auto Basophil % : x      06-25    129[L]  |  97  |  14  ----------------------------<  100[H]  3.7   |  27  |  0.78    Ca    8.5      2025 08:06  Phos  3.8     06-24  Mg     1.8     06-24    TPro  6.0  /  Alb  2.7[L]  /  TBili  0.5  /  DBili  x   /  AST  15  /  ALT  29  /  AlkPhos  56  06-24    CAPILLARY BLOOD GLUCOSE      POCT Blood Glucose.: 145 mg/dL (2025 18:27)        LIVER FUNCTIONS - ( 2025 17:40 )  Alb: 2.7 g/dL / Pro: 6.0 g/dL / ALK PHOS: 56 U/L / ALT: 29 U/L DA / AST: 15 U/L / GGT: x           Creatinine Trend: 0.78<--, 1.13<--, 0.96<--, 0.91<--, 0.87<--, 1.27<--  I&O's Summary              MEDICATIONS:    MEDICATIONS  (STANDING):  cefTRIAXone   IVPB      cefTRIAXone   IVPB 1000 milliGRAM(s) IV Intermittent every 24 hours  chlorhexidine 2% Cloths 1 Application(s) Topical <User Schedule>  enoxaparin Injectable 40 milliGRAM(s) SubCutaneous every 24 hours  fluticasone propionate 50 MICROgram(s)/spray Nasal Spray 1 Spray(s) Both Nostrils two times a day  hydrocortisone 25 milliGRAM(s) Oral two times a day  oxymetazoline 0.05% Nasal Spray 1 Spray(s) Both Nostrils two times a day  polyethylene glycol 3350 17 Gram(s) Oral at bedtime  senna 2 Tablet(s) Oral at bedtime      MEDICATIONS  (PRN):  acetaminophen     Tablet .. 650 milliGRAM(s) Oral every 6 hours PRN Temp greater or equal to 38C (100.4F), Mild Pain (1 - 3)  OLANZapine 2.5 milliGRAM(s) Oral once PRN agitation  ondansetron    Tablet 4 milliGRAM(s) Oral daily PRN Nausea and/or Vomiting      REVIEW OF SYSTEMS:                           ALL ROS DONE [ X   ]    CONSTITUTIONAL:  LETHARGIC [   ], FEVER [   ], UNRESPONSIVE [   ]  CVS:  CP  [   ], SOB, [   ], PALPITATIONS [   ], DIZZYNESS [   ]  RS: COUGH [   ], SPUTUM [   ]  GI: ABDOMINAL PAIN [   ], NAUSEA [   ], VOMITINGS [   ], DIARRHEA [   ], CONSTIPATION [   ]  :  DYSURIA [   ], NOCTURIA [   ], INCREASED FREQUENCY [   ], DRIBLING [   ],  SKELETAL: PAINFUL JOINTS [   ], SWOLLEN JOINTS [   ], NECK ACHE [   ], LOW BACK ACHE [   ],  SKIN : ULCERS [   ], RASH [   ], ITCHING [   ]  CNS: HEAD ACHE [   ], DOUBLE VISION [   ], BLURRED VISION [   ], AMS / CONFUSION [   ], SEIZURES [   ], WEAKNESS [   ],TINGLING / NUMBNESS [   ]          PHYSICAL EXAMINATION:    GENERAL APPEARANCE: NO DISTRESS  HEENT:  NO PALLOR, NO  JVD,  NO   NODES, NECK SUPPLE  CVS: S1 +, S2 +,   RS: AEEB,  OCCASIONAL  RALES +,   NO RONCHI  ABD: SOFT, NT, NO, BS +  EXT: NO PE  SKIN: WARM,   SKELETAL:  ROM REDUCED AT CERVICAL & LS SPINE  CNS:  AAO X 3 ,   POOR POWER IN ALL LIMBS        RADIOLOGY :    < from: CT Chest w/ IV Cont (25 @ 14:22) >  IMPRESSION:  No bowel obstruction. Visualized appendix appears unremarkable. Collapse   mild hiatal hernia versus distal esophageal mural thickening. EGD may be   pursued for further evaluation. Possible focal mural thickening at the   cecum. Colonoscopy may be pursued to rule out colon cancer. Moderate to   large amount of stool in the rectum.    < end of copied text >  < from: CT Head No Cont (25 @ 14:17) >  IMPRESSION:    No acute intracranial  hemorrhage, edema or mass effect.    This study cannot exclude the possibility of a isodense mass or possible   metastatic disease. A follow-up MRI or CT head with contrast would be   necessary for improved sensitivity.    < end of copied text >  < from: CT Maxillofacial w/ IV Cont (23 @ 13:20) >  IMPRESSION:  No evidence of recurrent tumor.    Status post interval left mandibular molar extraction.    Otherwise stable exam.    < end of copied text >        ASSESSMENT :     hyponatremia   Chronic adrenal insufficiency  Hypertension, HLD  Melanoma   - DEPRESSION, ANXIETY NEUROSIS/PANIC ATTACKS        PLAN:  HPI:  65M PMH HTN, HLD, depression, and melanoma presenting with nausea and vomiting x 4 days. He states that he had URI symptoms one week ago and since then has had decreased appetite. He reported that he had nausea and vomiting since Saturday, where he had 3-5 episodes of NBNB vomiting. He has had poor po intake since Saturday due to being nervous about feeling nauseous after, but has been drinking two to three 8 oz cups of water each day. He denies any dizziness, lightheadedness, blurry vision, or abdominal pain. He does report that he noticed his speech was slower than normal and that he is more lethargic. He denies missing any doses of his medications, including his hydrocortisone 10mg BID. He denies fevers, chills, CP, SOB, diarrhea, dysuria, numbness/tingling/weakness in extremities. He lives at home alone and ambulates independently.       # DC PLAN - PT EVALUATION AND PLACEMENT IN [facility]   - CHANGE ORAL HYDROCORTISONE TO ORAL DOSE PENDING ENDOCRINOLOGY/NEPHROLOGY RECOMMENDATION      # CODE SEPSIS CALLED   - NO COUGH, RHINORRHEA, CHEST PAIN, DYSPNEA, ABDOMINAL PAIN, N/V/C/D, RASHES, SWELLING  - STARTED ROCEPHIN, F/U BCX AND UCX  - F/U RVP  - F/U CXR  - PLANNED FOR SMALL BOLUS OF FLUID [JUDICIOUSLY]  - ID CONSULT CALLED      # SEVERE HYPONATREMIA - S/P ICU MONITORING, IV. NaCL 3%, IV. HYDROCORTISONE  # LIKELY SIADH - ON FLUID RESTRICTION 800 ML/DAY  # RESOLVING HYPOPHOSPHATEMIA    # RESOLVED ACUTE URTI AND RESOLVING HEADACHES - SYMPTOMATIC PAIN Rx AS NEEDED  - MAXILLARY SINUSITIS    # CONSTIPATION - BOWEL REGIMEN AS NEEDED    # IMPAIRED GAIT DUE TO GENERALIZED MUSCLE WEAKNESS, CERVICAL & LS SPONDYLOPATHY, POLYARTHRITIS AND PERIPHERAL NEUROPATHY  # OP    # ADULT FAILURE TO THRIVE, SEVERE PROTEIN CALORIE MALNUTRITION - HIGH RISK FOR WORSENING MALNUTRITION     # HTN, HLD - ANTI HTN Rx ON HOLD DUE TO HYPOTENSION    # H/O ADRENAL INSUFFICIENCY - ON IV. HYDROCORTISONE 50 MG Q 12 HRS     # DEPRESSION, ANXIETY NEUROSIS - NOT ON ANY Rx AT HOME - GETS PSYCHOTHERAPY ALONE    # H/O MELANOMA    # GI AND DVT PROPHYLAXIS     Patient is a 65y old  Male who presents with a chief complaint of severe hyponatremia, cortisol crisis (2025 18:52)    PATIENT IS SEEN AND EXAMINED IN MEDICAL FLOOR.      ALLERGIES:  No Known Allergies      Daily     Daily Weight in k.4 (2025 05:25)    VITALS:    Vital Signs Last 24 Hrs  T(C): 36.9 (2025 08:05), Max: 38.9 (2025 15:19)  T(F): 98.4 (2025 08:05), Max: 102 (2025 15:19)  HR: 99 (2025 05:25) (99 - 122)  BP: 141/81 (2025 05:25) (86/57 - 141/81)  BP(mean): 101 (2025 05:25) (86 - 101)  RR: 18 (2025 05:25) (17 - 18)  SpO2: 96% (2025 05:25) (95% - 98%)    Parameters below as of 2025 05:25  Patient On (Oxygen Delivery Method): room air        LABS:    CBC Full  -  ( 2025 08:06 )  WBC Count : 14.80 K/uL  RBC Count : 3.82 M/uL  Hemoglobin : 11.9 g/dL  Hematocrit : 32.4 %  Platelet Count - Automated : 224 K/uL  Mean Cell Volume : 84.8 fl  Mean Cell Hemoglobin : 31.2 pg  Mean Cell Hemoglobin Concentration : 36.7 g/dL  Auto Neutrophil # : x  Auto Lymphocyte # : x  Auto Monocyte # : x  Auto Eosinophil # : x  Auto Basophil # : x  Auto Neutrophil % : x  Auto Lymphocyte % : x  Auto Monocyte % : x  Auto Eosinophil % : x  Auto Basophil % : x      -25    129[L]  |  97  |  14  ----------------------------<  100[H]  3.7   |  27  |  0.78    Ca    8.5      2025 08:06  Phos  3.8     06-24  Mg     1.8     06-24    TPro  6.0  /  Alb  2.7[L]  /  TBili  0.5  /  DBili  x   /  AST  15  /  ALT  29  /  AlkPhos  56  06-24    CAPILLARY BLOOD GLUCOSE      POCT Blood Glucose.: 145 mg/dL (2025 18:27)        LIVER FUNCTIONS - ( 2025 17:40 )  Alb: 2.7 g/dL / Pro: 6.0 g/dL / ALK PHOS: 56 U/L / ALT: 29 U/L DA / AST: 15 U/L / GGT: x           Creatinine Trend: 0.78<--, 1.13<--, 0.96<--, 0.91<--, 0.87<--, 1.27<--  I&O's Summary              MEDICATIONS:    MEDICATIONS  (STANDING):  cefTRIAXone   IVPB      cefTRIAXone   IVPB 1000 milliGRAM(s) IV Intermittent every 24 hours  chlorhexidine 2% Cloths 1 Application(s) Topical <User Schedule>  enoxaparin Injectable 40 milliGRAM(s) SubCutaneous every 24 hours  fluticasone propionate 50 MICROgram(s)/spray Nasal Spray 1 Spray(s) Both Nostrils two times a day  hydrocortisone 25 milliGRAM(s) Oral two times a day  oxymetazoline 0.05% Nasal Spray 1 Spray(s) Both Nostrils two times a day  polyethylene glycol 3350 17 Gram(s) Oral at bedtime  senna 2 Tablet(s) Oral at bedtime      MEDICATIONS  (PRN):  acetaminophen     Tablet .. 650 milliGRAM(s) Oral every 6 hours PRN Temp greater or equal to 38C (100.4F), Mild Pain (1 - 3)  OLANZapine 2.5 milliGRAM(s) Oral once PRN agitation  ondansetron    Tablet 4 milliGRAM(s) Oral daily PRN Nausea and/or Vomiting      REVIEW OF SYSTEMS:                           ALL ROS DONE [ X   ]    CONSTITUTIONAL:  LETHARGIC [   ], FEVER [   ], UNRESPONSIVE [   ]  CVS:  CP  [   ], SOB, [   ], PALPITATIONS [   ], DIZZYNESS [   ]  RS: COUGH [   ], SPUTUM [   ]  GI: ABDOMINAL PAIN [   ], NAUSEA [   ], VOMITINGS [   ], DIARRHEA [   ], CONSTIPATION [   ]  :  DYSURIA [   ], NOCTURIA [   ], INCREASED FREQUENCY [   ], DRIBLING [   ],  SKELETAL: PAINFUL JOINTS [   ], SWOLLEN JOINTS [   ], NECK ACHE [   ], LOW BACK ACHE [   ],  SKIN : ULCERS [   ], RASH [   ], ITCHING [   ]  CNS: HEAD ACHE [   ], DOUBLE VISION [   ], BLURRED VISION [   ], AMS / CONFUSION [   ], SEIZURES [   ], WEAKNESS [   ],TINGLING / NUMBNESS [   ]          PHYSICAL EXAMINATION:    GENERAL APPEARANCE: NO DISTRESS  HEENT:  NO PALLOR, NO  JVD,  NO   NODES, NECK SUPPLE  CVS: S1 +, S2 +,   RS: AEEB,  OCCASIONAL  RALES +,   NO RONCHI  ABD: SOFT, NT, NO, BS +  EXT: NO PE  SKIN: WARM,   SKELETAL:  ROM REDUCED AT CERVICAL & LS SPINE  CNS:  AAO X 3 ,   POOR POWER IN ALL LIMBS        RADIOLOGY :    < from: CT Chest w/ IV Cont (25 @ 14:22) >  IMPRESSION:  No bowel obstruction. Visualized appendix appears unremarkable. Collapse   mild hiatal hernia versus distal esophageal mural thickening. EGD may be   pursued for further evaluation. Possible focal mural thickening at the   cecum. Colonoscopy may be pursued to rule out colon cancer. Moderate to   large amount of stool in the rectum.    < end of copied text >  < from: CT Head No Cont (25 @ 14:17) >  IMPRESSION:    No acute intracranial  hemorrhage, edema or mass effect.    This study cannot exclude the possibility of a isodense mass or possible   metastatic disease. A follow-up MRI or CT head with contrast would be   necessary for improved sensitivity.    < end of copied text >  < from: CT Maxillofacial w/ IV Cont (23 @ 13:20) >  IMPRESSION:  No evidence of recurrent tumor.    Status post interval left mandibular molar extraction.    Otherwise stable exam.    < end of copied text >        ASSESSMENT :     hyponatremia   Chronic adrenal insufficiency  Hypertension, HLD  Melanoma   - DEPRESSION, ANXIETY NEUROSIS/PANIC ATTACKS        PLAN:  HPI:  65M PMH HTN, HLD, depression, and melanoma presenting with nausea and vomiting x 4 days. He states that he had URI symptoms one week ago and since then has had decreased appetite. He reported that he had nausea and vomiting since Saturday, where he had 3-5 episodes of NBNB vomiting. He has had poor po intake since Saturday due to being nervous about feeling nauseous after, but has been drinking two to three 8 oz cups of water each day. He denies any dizziness, lightheadedness, blurry vision, or abdominal pain. He does report that he noticed his speech was slower than normal and that he is more lethargic. He denies missing any doses of his medications, including his hydrocortisone 10mg BID. He denies fevers, chills, CP, SOB, diarrhea, dysuria, numbness/tingling/weakness in extremities. He lives at home alone and ambulates independently.       # DC PLAN - PT EVALUATION AND PLACEMENT IN [facility]     # SEPSIS SUSPECT S/T CYSTITIS  - NO COUGH, RHINORRHEA, CHEST PAIN, DYSPNEA, ABDOMINAL PAIN, N/V/C/D, RASHES, SWELLING  - STARTED ROCEPHIN, F/U BCX AND UCX  - F/U CXR  - ID CONSULT    # SEVERE HYPONATREMIA - S/P ICU MONITORING, IV. NaCL 3%, IV. HYDROCORTISONE  # LIKELY SIADH - ON FLUID RESTRICTION 800 ML/DAY  # RESOLVING HYPOPHOSPHATEMIA    # RESOLVED ACUTE URTI AND RESOLVING HEADACHES - SYMPTOMATIC PAIN Rx AS NEEDED  - MAXILLARY SINUSITIS    # CONSTIPATION - BOWEL REGIMEN AS NEEDED    # IMPAIRED GAIT DUE TO GENERALIZED MUSCLE WEAKNESS, CERVICAL & LS SPONDYLOPATHY, POLYARTHRITIS AND PERIPHERAL NEUROPATHY  # OP    # ADULT FAILURE TO THRIVE, SEVERE PROTEIN CALORIE MALNUTRITION - HIGH RISK FOR WORSENING MALNUTRITION     # HTN, HLD - ANTI HTN Rx ON HOLD DUE TO HYPOTENSION    # H/O ADRENAL INSUFFICIENCY - ON IV. HYDROCORTISONE 50 MG Q 12 HRS     # DEPRESSION, ANXIETY NEUROSIS - NOT ON ANY Rx AT HOME - GETS PSYCHOTHERAPY ALONE    # H/O MELANOMA    # GI AND DVT PROPHYLAXIS     Patient is a 65y old  Male who presents with a chief complaint of severe hyponatremia, cortisol crisis (2025 18:52)    PATIENT IS SEEN AND EXAMINED IN MEDICAL FLOOR.      ALLERGIES:  No Known Allergies      Daily     Daily Weight in k.4 (2025 05:25)    VITALS:    Vital Signs Last 24 Hrs  T(C): 36.9 (2025 08:05), Max: 38.9 (2025 15:19)  T(F): 98.4 (2025 08:05), Max: 102 (2025 15:19)  HR: 99 (2025 05:25) (99 - 122)  BP: 141/81 (2025 05:25) (86/57 - 141/81)  BP(mean): 101 (2025 05:25) (86 - 101)  RR: 18 (2025 05:25) (17 - 18)  SpO2: 96% (2025 05:25) (95% - 98%)    Parameters below as of 2025 05:25  Patient On (Oxygen Delivery Method): room air        LABS:    CBC Full  -  ( 2025 08:06 )  WBC Count : 14.80 K/uL  RBC Count : 3.82 M/uL  Hemoglobin : 11.9 g/dL  Hematocrit : 32.4 %  Platelet Count - Automated : 224 K/uL  Mean Cell Volume : 84.8 fl  Mean Cell Hemoglobin : 31.2 pg  Mean Cell Hemoglobin Concentration : 36.7 g/dL  Auto Neutrophil # : x  Auto Lymphocyte # : x  Auto Monocyte # : x  Auto Eosinophil # : x  Auto Basophil # : x  Auto Neutrophil % : x  Auto Lymphocyte % : x  Auto Monocyte % : x  Auto Eosinophil % : x  Auto Basophil % : x      -25    129[L]  |  97  |  14  ----------------------------<  100[H]  3.7   |  27  |  0.78    Ca    8.5      2025 08:06  Phos  3.8     06-24  Mg     1.8     06-24    TPro  6.0  /  Alb  2.7[L]  /  TBili  0.5  /  DBili  x   /  AST  15  /  ALT  29  /  AlkPhos  56  06-24    CAPILLARY BLOOD GLUCOSE      POCT Blood Glucose.: 145 mg/dL (2025 18:27)        LIVER FUNCTIONS - ( 2025 17:40 )  Alb: 2.7 g/dL / Pro: 6.0 g/dL / ALK PHOS: 56 U/L / ALT: 29 U/L DA / AST: 15 U/L / GGT: x           Creatinine Trend: 0.78<--, 1.13<--, 0.96<--, 0.91<--, 0.87<--, 1.27<--  I&O's Summary              MEDICATIONS:    MEDICATIONS  (STANDING):  cefTRIAXone   IVPB      cefTRIAXone   IVPB 1000 milliGRAM(s) IV Intermittent every 24 hours  chlorhexidine 2% Cloths 1 Application(s) Topical <User Schedule>  enoxaparin Injectable 40 milliGRAM(s) SubCutaneous every 24 hours  fluticasone propionate 50 MICROgram(s)/spray Nasal Spray 1 Spray(s) Both Nostrils two times a day  hydrocortisone 25 milliGRAM(s) Oral two times a day  oxymetazoline 0.05% Nasal Spray 1 Spray(s) Both Nostrils two times a day  polyethylene glycol 3350 17 Gram(s) Oral at bedtime  senna 2 Tablet(s) Oral at bedtime      MEDICATIONS  (PRN):  acetaminophen     Tablet .. 650 milliGRAM(s) Oral every 6 hours PRN Temp greater or equal to 38C (100.4F), Mild Pain (1 - 3)  OLANZapine 2.5 milliGRAM(s) Oral once PRN agitation  ondansetron    Tablet 4 milliGRAM(s) Oral daily PRN Nausea and/or Vomiting      REVIEW OF SYSTEMS:                           ALL ROS DONE [ X   ]    CONSTITUTIONAL:  LETHARGIC [   ], FEVER [   ], UNRESPONSIVE [   ]  CVS:  CP  [   ], SOB, [   ], PALPITATIONS [   ], DIZZYNESS [   ]  RS: COUGH [   ], SPUTUM [   ]  GI: ABDOMINAL PAIN [   ], NAUSEA [   ], VOMITINGS [   ], DIARRHEA [   ], CONSTIPATION [   ]  :  DYSURIA [   ], NOCTURIA [   ], INCREASED FREQUENCY [   ], DRIBLING [   ],  SKELETAL: PAINFUL JOINTS [   ], SWOLLEN JOINTS [   ], NECK ACHE [   ], LOW BACK ACHE [   ],  SKIN : ULCERS [   ], RASH [   ], ITCHING [   ]  CNS: HEAD ACHE [   ], DOUBLE VISION [   ], BLURRED VISION [   ], AMS / CONFUSION [   ], SEIZURES [   ], WEAKNESS [   ],TINGLING / NUMBNESS [   ]          PHYSICAL EXAMINATION:    GENERAL APPEARANCE: NO DISTRESS  HEENT:  NO PALLOR, NO  JVD,  NO   NODES, NECK SUPPLE  CVS: S1 +, S2 +,   RS: AEEB,  OCCASIONAL  RALES +,   NO RONCHI  ABD: SOFT, NT, NO, BS +  EXT: NO PE  SKIN: WARM,   SKELETAL:  ROM REDUCED AT CERVICAL & LS SPINE  CNS:  AAO X 3 ,   POOR POWER IN ALL LIMBS        RADIOLOGY :    < from: CT Chest w/ IV Cont (25 @ 14:22) >  IMPRESSION:  No bowel obstruction. Visualized appendix appears unremarkable. Collapse   mild hiatal hernia versus distal esophageal mural thickening. EGD may be   pursued for further evaluation. Possible focal mural thickening at the   cecum. Colonoscopy may be pursued to rule out colon cancer. Moderate to   large amount of stool in the rectum.    < end of copied text >  < from: CT Head No Cont (25 @ 14:17) >  IMPRESSION:    No acute intracranial  hemorrhage, edema or mass effect.    This study cannot exclude the possibility of a isodense mass or possible   metastatic disease. A follow-up MRI or CT head with contrast would be   necessary for improved sensitivity.    < end of copied text >  < from: CT Maxillofacial w/ IV Cont (23 @ 13:20) >  IMPRESSION:  No evidence of recurrent tumor.    Status post interval left mandibular molar extraction.    Otherwise stable exam.    < end of copied text >        ASSESSMENT :     hyponatremia   Chronic adrenal insufficiency  Hypertension, HLD  Melanoma   - DEPRESSION, ANXIETY NEUROSIS/PANIC ATTACKS        PLAN:  HPI:  65M PMH HTN, HLD, depression, and melanoma presenting with nausea and vomiting x 4 days. He states that he had URI symptoms one week ago and since then has had decreased appetite. He reported that he had nausea and vomiting since Saturday, where he had 3-5 episodes of NBNB vomiting. He has had poor po intake since Saturday due to being nervous about feeling nauseous after, but has been drinking two to three 8 oz cups of water each day. He denies any dizziness, lightheadedness, blurry vision, or abdominal pain. He does report that he noticed his speech was slower than normal and that he is more lethargic. He denies missing any doses of his medications, including his hydrocortisone 10mg BID. He denies fevers, chills, CP, SOB, diarrhea, dysuria, numbness/tingling/weakness in extremities. He lives at home alone and ambulates independently.       # DC PLAN - PT EVALUATION AND PLACEMENT IN [facility]     # SEPSIS SUSPECT S/T CYSTITIS  - NO COUGH, RHINORRHEA, CHEST PAIN, DYSPNEA, ABDOMINAL PAIN, N/V/C/D, RASHES, SWELLING  - STARTED ROCEPHIN, F/U BCX AND UCX  - F/U CXR  - ID CONSULT    # SEVERE HYPONATREMIA - S/P ICU MONITORING, IV. NaCL 3%, IV. HYDROCORTISONE  # LIKELY SIADH - ON FLUID RESTRICTION 800 ML/DAY  # RESOLVING HYPOPHOSPHATEMIA    # RESOLVED ACUTE URTI AND RESOLVING HEADACHES - SYMPTOMATIC PAIN Rx AS NEEDED  - MAXILLARY SINUSITIS    # FECAL IMPACTION  # CONSTIPATION   - BOWEL REGIMEN   - TAP WATER ENEMA    # IMPAIRED GAIT DUE TO GENERALIZED MUSCLE WEAKNESS, CERVICAL & LS SPONDYLOPATHY, POLYARTHRITIS AND PERIPHERAL NEUROPATHY  # OP    # ADULT FAILURE TO THRIVE, SEVERE PROTEIN CALORIE MALNUTRITION - HIGH RISK FOR WORSENING MALNUTRITION     # HTN, HLD - ANTI HTN Rx ON HOLD DUE TO HYPOTENSION    # H/O ADRENAL INSUFFICIENCY - ON IV. HYDROCORTISONE 50 MG Q 12 HRS     # DEPRESSION, ANXIETY NEUROSIS - NOT ON ANY Rx AT HOME - GETS PSYCHOTHERAPY ALONE    # H/O MELANOMA    # GI AND DVT PROPHYLAXIS

## 2025-06-25 NOTE — PROGRESS NOTE ADULT - PROBLEM SELECTOR PLAN 1
6/24 sepsis code called, tachy, low bp, fever 102F.   CXR negative  UA positive, Ucx sent   started Ceftriaxone  ID consulted. dr. Mcdermott  f/u Bcx and Ucx  Monitor Fever 6/24 sepsis code called, tachy, low bp, fever 102F.   CXR negative  UA positive, Ucx sent   started Ceftriaxone and s/p IV bolus  ID consulted. dr. Mcdermott  Lactate 2.2, then normalized after bolus  f/u Bcx and Ucx  Monitor Fever

## 2025-06-25 NOTE — PROGRESS NOTE ADULT - SUBJECTIVE AND OBJECTIVE BOX
NEPHROLOGY MEDICAL CARE, [facility] - Dr. Dequan Suresh/ Dr. Mary Lance/ Dr. Jean Paul Smalls/ Dr. Shawn Alvarez    Date of Service: 06-25-25    Patient was seen and examined at bedside.    CC: patient is okay and afebrile this morning.     Vital Signs Last 24 Hrs  T(C): 37.2 (25 Jun 2025 14:26), Max: 37.9 (25 Jun 2025 05:25)  T(F): 98.9 (25 Jun 2025 14:26), Max: 100.2 (25 Jun 2025 05:25)  HR: 104 (25 Jun 2025 14:26) (99 - 112)  BP: 126/79 (25 Jun 2025 14:26) (95/57 - 141/81)  BP(mean): 102 (25 Jun 2025 14:25) (86 - 102)  RR: 18 (25 Jun 2025 14:26) (17 - 18)  SpO2: 97% (25 Jun 2025 14:26) (95% - 97%)    Parameters below as of 25 Jun 2025 14:26  Patient On (Oxygen Delivery Method): room air          PHYSICAL EXAM:  General: No acute respiratory distress.  Eyes: conjunctiva and sclera clear  ENMT: Atraumatic, Normocephalic, supple,   Respiratory: Bilateral clear lungs; No rales, rhonchi, wheezing  Cardiovascular: S1S2+; no m/r/g  Gastrointestinal: Soft, Non-tender, Nondistended; Bowel sounds present,   Neuro:  Awake, Alert & Oriented X3  Ext:   No edema, No Cyanosis  Skin: No visible rashes      MEDICATIONS:  MEDICATIONS  (STANDING):  cefTRIAXone   IVPB      cefTRIAXone   IVPB 1000 milliGRAM(s) IV Intermittent every 24 hours  chlorhexidine 2% Cloths 1 Application(s) Topical <User Schedule>  enoxaparin Injectable 40 milliGRAM(s) SubCutaneous every 24 hours  fluticasone propionate 50 MICROgram(s)/spray Nasal Spray 1 Spray(s) Both Nostrils two times a day  hydrocortisone sodium succinate Injectable 50 milliGRAM(s) IV Push every 12 hours  oxymetazoline 0.05% Nasal Spray 1 Spray(s) Both Nostrils two times a day  polyethylene glycol 3350 17 Gram(s) Oral at bedtime  senna 2 Tablet(s) Oral at bedtime    MEDICATIONS  (PRN):  acetaminophen     Tablet .. 650 milliGRAM(s) Oral every 6 hours PRN Temp greater or equal to 38C (100.4F), Mild Pain (1 - 3)  OLANZapine 2.5 milliGRAM(s) Oral once PRN agitation  ondansetron    Tablet 4 milliGRAM(s) Oral daily PRN Nausea and/or Vomiting          LABS:                        11.9   14.80 )-----------( 224      ( 25 Jun 2025 08:06 )             32.4     06-25    129[L]  |  97  |  14  ----------------------------<  100[H]  3.7   |  27  |  0.78    Ca    8.5      25 Jun 2025 08:06  Phos  3.8     06-24  Mg     1.8     06-24    TPro  6.0  /  Alb  2.7[L]  /  TBili  0.5  /  DBili  x   /  AST  15  /  ALT  29  /  AlkPhos  56  06-24      Urinalysis Basic - ( 25 Jun 2025 08:06 )    Color: x / Appearance: x / SG: x / pH: x  Gluc: 100 mg/dL / Ketone: x  / Bili: x / Urobili: x   Blood: x / Protein: x / Nitrite: x   Leuk Esterase: x / RBC: x / WBC x   Sq Epi: x / Non Sq Epi: x / Bacteria: x        Urine studies    PTH and Vit D:

## 2025-06-26 DIAGNOSIS — E87.6 HYPOKALEMIA: ICD-10-CM

## 2025-06-26 LAB
-  AMOXICILLIN/CLAVULANIC ACID: SIGNIFICANT CHANGE UP
-  AMPICILLIN/SULBACTAM: SIGNIFICANT CHANGE UP
-  AMPICILLIN: SIGNIFICANT CHANGE UP
-  AZTREONAM: SIGNIFICANT CHANGE UP
-  CEFAZOLIN: SIGNIFICANT CHANGE UP
-  CEFEPIME: SIGNIFICANT CHANGE UP
-  CEFOXITIN: SIGNIFICANT CHANGE UP
-  CEFTRIAXONE: SIGNIFICANT CHANGE UP
-  CEFUROXIME: SIGNIFICANT CHANGE UP
-  CIPROFLOXACIN: SIGNIFICANT CHANGE UP
-  ERTAPENEM: SIGNIFICANT CHANGE UP
-  GENTAMICIN: SIGNIFICANT CHANGE UP
-  IMIPENEM: SIGNIFICANT CHANGE UP
-  LEVOFLOXACIN: SIGNIFICANT CHANGE UP
-  MEROPENEM: SIGNIFICANT CHANGE UP
-  NITROFURANTOIN: SIGNIFICANT CHANGE UP
-  PIPERACILLIN/TAZOBACTAM: SIGNIFICANT CHANGE UP
-  TIGECYCLINE: SIGNIFICANT CHANGE UP
-  TOBRAMYCIN: SIGNIFICANT CHANGE UP
-  TRIMETHOPRIM/SULFAMETHOXAZOLE: SIGNIFICANT CHANGE UP
ANION GAP SERPL CALC-SCNC: 6 MMOL/L — SIGNIFICANT CHANGE UP (ref 5–17)
BASOPHILS # BLD AUTO: 0.03 K/UL — SIGNIFICANT CHANGE UP (ref 0–0.2)
BASOPHILS NFR BLD AUTO: 0.3 % — SIGNIFICANT CHANGE UP (ref 0–2)
BUN SERPL-MCNC: 17 MG/DL — SIGNIFICANT CHANGE UP (ref 7–18)
CALCIUM SERPL-MCNC: 8.9 MG/DL — SIGNIFICANT CHANGE UP (ref 8.4–10.5)
CHLORIDE SERPL-SCNC: 102 MMOL/L — SIGNIFICANT CHANGE UP (ref 96–108)
CO2 SERPL-SCNC: 26 MMOL/L — SIGNIFICANT CHANGE UP (ref 22–31)
CREAT SERPL-MCNC: 0.8 MG/DL — SIGNIFICANT CHANGE UP (ref 0.5–1.3)
CULTURE RESULTS: ABNORMAL
EGFR: 98 ML/MIN/1.73M2 — SIGNIFICANT CHANGE UP
EGFR: 98 ML/MIN/1.73M2 — SIGNIFICANT CHANGE UP
EOSINOPHIL # BLD AUTO: 0.11 K/UL — SIGNIFICANT CHANGE UP (ref 0–0.5)
EOSINOPHIL NFR BLD AUTO: 1.1 % — SIGNIFICANT CHANGE UP (ref 0–6)
GLUCOSE SERPL-MCNC: 93 MG/DL — SIGNIFICANT CHANGE UP (ref 70–99)
HCT VFR BLD CALC: 32.7 % — LOW (ref 39–50)
HGB BLD-MCNC: 11.7 G/DL — LOW (ref 13–17)
IMM GRANULOCYTES NFR BLD AUTO: 0.8 % — SIGNIFICANT CHANGE UP (ref 0–0.9)
LYMPHOCYTES # BLD AUTO: 2.2 K/UL — SIGNIFICANT CHANGE UP (ref 1–3.3)
LYMPHOCYTES # BLD AUTO: 22.1 % — SIGNIFICANT CHANGE UP (ref 13–44)
MCHC RBC-ENTMCNC: 31.5 PG — SIGNIFICANT CHANGE UP (ref 27–34)
MCHC RBC-ENTMCNC: 35.8 G/DL — SIGNIFICANT CHANGE UP (ref 32–36)
MCV RBC AUTO: 88.1 FL — SIGNIFICANT CHANGE UP (ref 80–100)
METHOD TYPE: SIGNIFICANT CHANGE UP
MONOCYTES # BLD AUTO: 0.88 K/UL — SIGNIFICANT CHANGE UP (ref 0–0.9)
MONOCYTES NFR BLD AUTO: 8.8 % — SIGNIFICANT CHANGE UP (ref 2–14)
NEUTROPHILS # BLD AUTO: 6.66 K/UL — SIGNIFICANT CHANGE UP (ref 1.8–7.4)
NEUTROPHILS NFR BLD AUTO: 66.9 % — SIGNIFICANT CHANGE UP (ref 43–77)
NRBC BLD AUTO-RTO: 0 /100 WBCS — SIGNIFICANT CHANGE UP (ref 0–0)
ORGANISM # SPEC MICROSCOPIC CNT: ABNORMAL
ORGANISM # SPEC MICROSCOPIC CNT: ABNORMAL
PLATELET # BLD AUTO: 230 K/UL — SIGNIFICANT CHANGE UP (ref 150–400)
POTASSIUM SERPL-MCNC: 3.4 MMOL/L — LOW (ref 3.5–5.3)
POTASSIUM SERPL-SCNC: 3.4 MMOL/L — LOW (ref 3.5–5.3)
RBC # BLD: 3.71 M/UL — LOW (ref 4.2–5.8)
RBC # FLD: 12.5 % — SIGNIFICANT CHANGE UP (ref 10.3–14.5)
SODIUM SERPL-SCNC: 134 MMOL/L — LOW (ref 135–145)
SPECIMEN SOURCE: SIGNIFICANT CHANGE UP
WBC # BLD: 9.96 K/UL — SIGNIFICANT CHANGE UP (ref 3.8–10.5)
WBC # FLD AUTO: 9.96 K/UL — SIGNIFICANT CHANGE UP (ref 3.8–10.5)

## 2025-06-26 RX ORDER — HYDROCORTISONE 20 MG
TABLET ORAL
Refills: 0 | Status: DISCONTINUED | OUTPATIENT
Start: 2025-06-26 | End: 2025-06-27

## 2025-06-26 RX ORDER — HYDROCORTISONE 20 MG
15 TABLET ORAL EVERY 12 HOURS
Refills: 0 | Status: CANCELLED | OUTPATIENT
Start: 2025-07-01 | End: 2025-06-27

## 2025-06-26 RX ORDER — HYDROCORTISONE 20 MG
25 TABLET ORAL EVERY 12 HOURS
Refills: 0 | Status: DISCONTINUED | OUTPATIENT
Start: 2025-06-26 | End: 2025-06-27

## 2025-06-26 RX ADMIN — Medication 1 SPRAY(S): at 17:27

## 2025-06-26 RX ADMIN — Medication 50 MILLIGRAM(S): at 05:24

## 2025-06-26 RX ADMIN — FLUTICASONE PROPIONATE 1 SPRAY(S): 50 SPRAY, METERED NASAL at 05:26

## 2025-06-26 RX ADMIN — CEFTRIAXONE 100 MILLIGRAM(S): 500 INJECTION, POWDER, FOR SOLUTION INTRAMUSCULAR; INTRAVENOUS at 17:27

## 2025-06-26 RX ADMIN — ENOXAPARIN SODIUM 40 MILLIGRAM(S): 100 INJECTION SUBCUTANEOUS at 05:25

## 2025-06-26 RX ADMIN — Medication 1 GRAM(S): at 05:26

## 2025-06-26 RX ADMIN — Medication 1 APPLICATION(S): at 05:23

## 2025-06-26 RX ADMIN — Medication 1 SPRAY(S): at 05:25

## 2025-06-26 RX ADMIN — Medication 1 GRAM(S): at 21:46

## 2025-06-26 RX ADMIN — FLUTICASONE PROPIONATE 1 SPRAY(S): 50 SPRAY, METERED NASAL at 17:27

## 2025-06-26 RX ADMIN — Medication 1 GRAM(S): at 13:24

## 2025-06-26 RX ADMIN — Medication 40 MILLIEQUIVALENT(S): at 13:24

## 2025-06-26 RX ADMIN — Medication 25 MILLIGRAM(S): at 18:10

## 2025-06-26 NOTE — PROGRESS NOTE ADULT - PROBLEM SELECTOR PLAN 7
sodium improving  PT reccs JOE, [facility] CM following  follow up Urine culture sensitivity, ID reccs for abd duration  Monitor fever, VS

## 2025-06-26 NOTE — PROGRESS NOTE ADULT - ASSESSMENT
1. Hyponatremia with unknown duration most likely chronic. Pt is clinically euvolemic. Multifactorial from High ADH state and Adrenal insuff.  -Na improving to 134; continue Nacl 1gm tid.    -low uric acid  -continue Fluid restriction 800ml to 1L/day.   -Check Seurm Na daily. Monitor I/O's daily. Avoid overcorrection of NA (8-10meq/day)  2. HTN:   -bp is low normal  -hold bp meds  -monitor bp.  3. h/o Adrenal inuff:  -on iv hydrocortisone  -Endo consulted.   4. Hypophosphatemia:  -phos improved.   -monitor phos  5. Fever possible UTI.   -on rocephin  -f/u urine culture.

## 2025-06-26 NOTE — PROGRESS NOTE ADULT - SUBJECTIVE AND OBJECTIVE BOX
65y Male lying in bed and in no acute distress. His urine cultures grew out Klebsiella pneumoniae and is currently on Rocephin, awaiting sensitivities. No nausea, vomiting, diarrhea or abdominal pain. Remains afebrile and WBC count has normalized.     MEDS:  cefTRIAXone   IVPB      cefTRIAXone   IVPB 1000 milliGRAM(s) IV Intermittent every 24 hours    ALLERGIES: Allergies    No Known Allergies    Intolerances    REVIEW OF SYSTEMS:  [  ] Not able to elicit  General: afebrile now,  no malaise  Chest: no cough no sob  GI: no nvd  : no urinary sxs   Skin: no rashes  Musculoskeletal: no trauma no LBP  Neuro: no ha's no dizziness     VITALS:  Vital Signs Last 24 Hrs  T(C): 37.1 (26 Jun 2025 05:15), Max: 37.5 (25 Jun 2025 20:55)  T(F): 98.8 (26 Jun 2025 05:15), Max: 99.5 (25 Jun 2025 20:55)  HR: 94 (26 Jun 2025 05:15) (94 - 108)  BP: 141/82 (26 Jun 2025 05:15) (111/61 - 141/82)  BP(mean): 78 (25 Jun 2025 20:55) (78 - 102)  RR: 18 (26 Jun 2025 05:15) (18 - 18)  SpO2: 96% (26 Jun 2025 05:15) (96% - 97%)    Parameters below as of 26 Jun 2025 05:15  Patient On (Oxygen Delivery Method): room air    PHYSICAL EXAM:  HEENT: normocephalic, conjunctivae and sclerae clear; moist mucous membranes  Neck: supple no LN's   Respiratory: lungs clear no rales  Cardiovascular: S1 S2 reg no murmurs  Gastrointestinal: +BS with soft, nondistended abdomen; nontender  : urinal at bedside with clear yellow urine  Extremities: no edema  Skin: no rashes  Ortho: no erythema or joint swelling  Neuro: AAO x 3    LABS/DIAGNOSTIC TESTS:                        11.7   9.96  )-----------( 230      ( 26 Jun 2025 05:55 )             32.7     WBC Count: 9.96 K/uL (06-26 @ 05:55)  WBC Count: 14.80 K/uL (06-25 @ 08:06)  WBC Count: 14.27 K/uL (06-24 @ 17:40)  WBC Count: 13.08 K/uL (06-24 @ 06:00)  WBC Count: 8.83 K/uL (06-23 @ 05:30)    06-26    134[L]  |  102  |  17  ----------------------------<  93  3.4[L]   |  26  |  0.80    Ca    8.9      26 Jun 2025 05:55  Phos  3.8     06-24  Mg     1.8     06-24    TPro  6.0  /  Alb  2.7[L]  /  TBili  0.5  /  DBili  x   /  AST  15  /  ALT  29  /  AlkPhos  56  06-24    CULTURES:   Culture - Urine (06.24.25 @ 16:20)   Specimen Source: Clean Catch  Culture Results:   >100,000 CFU/ml Klebsiella pneumoniae  Blood Blood-Peripheral  06-24 @ 16:20   No growth at 24 hours  --  --    Blood Blood-Peripheral  06-24 @ 16:10   No growth at 24 hours  --  --    RADIOLOGY:  no new studies

## 2025-06-26 NOTE — PROGRESS NOTE ADULT - SUBJECTIVE AND OBJECTIVE BOX
NEPHROLOGY MEDICAL CARE, [facility] - Dr. Dequan Suresh/ Dr. Mary Lance/ Dr. Jean Paul Smalls/ Dr. Shawn Alvarez    Date of Service: 06-26-25    Patient was seen and examined at bedside.    CC: patient is okay and NAD    Vital Signs Last 24 Hrs  T(C): 37.1 (26 Jun 2025 05:15), Max: 37.5 (25 Jun 2025 20:55)  T(F): 98.8 (26 Jun 2025 05:15), Max: 99.5 (25 Jun 2025 20:55)  HR: 94 (26 Jun 2025 05:15) (94 - 108)  BP: 141/82 (26 Jun 2025 05:15) (111/61 - 141/82)  BP(mean): 78 (25 Jun 2025 20:55) (78 - 102)  RR: 18 (26 Jun 2025 05:15) (18 - 18)  SpO2: 96% (26 Jun 2025 05:15) (96% - 97%)    Parameters below as of 26 Jun 2025 05:15  Patient On (Oxygen Delivery Method): room air          PHYSICAL EXAM:  General: No acute respiratory distress.  Eyes: conjunctiva and sclera clear  ENMT: Atraumatic, Normocephalic, supple,   Respiratory: Bilateral clear lungs; No rales, rhonchi, wheezing  Cardiovascular: S1S2+; no m/r/g  Gastrointestinal: Soft, Non-tender, Nondistended; Bowel sounds present,   Neuro:  Awake, Alert & Oriented X3  Ext:   No edema, No Cyanosis  Skin: No visible rashes      MEDICATIONS:  MEDICATIONS  (STANDING):  cefTRIAXone   IVPB      cefTRIAXone   IVPB 1000 milliGRAM(s) IV Intermittent every 24 hours  chlorhexidine 2% Cloths 1 Application(s) Topical <User Schedule>  enoxaparin Injectable 40 milliGRAM(s) SubCutaneous every 24 hours  fluticasone propionate 50 MICROgram(s)/spray Nasal Spray 1 Spray(s) Both Nostrils two times a day  hydrocortisone sodium succinate Injectable 50 milliGRAM(s) IV Push every 12 hours  oxymetazoline 0.05% Nasal Spray 1 Spray(s) Both Nostrils two times a day  polyethylene glycol 3350 17 Gram(s) Oral at bedtime  senna 2 Tablet(s) Oral at bedtime  sodium chloride 1 Gram(s) Oral three times a day    MEDICATIONS  (PRN):  acetaminophen     Tablet .. 650 milliGRAM(s) Oral every 6 hours PRN Temp greater or equal to 38C (100.4F), Mild Pain (1 - 3)  OLANZapine 2.5 milliGRAM(s) Oral once PRN agitation  ondansetron    Tablet 4 milliGRAM(s) Oral daily PRN Nausea and/or Vomiting          LABS:                        11.7   9.96  )-----------( 230      ( 26 Jun 2025 05:55 )             32.7     06-26    134[L]  |  102  |  17  ----------------------------<  93  3.4[L]   |  26  |  0.80    Ca    8.9      26 Jun 2025 05:55  Phos  3.8     06-24  Mg     1.8     06-24    TPro  6.0  /  Alb  2.7[L]  /  TBili  0.5  /  DBili  x   /  AST  15  /  ALT  29  /  AlkPhos  56  06-24      Urinalysis Basic - ( 26 Jun 2025 05:55 )    Color: x / Appearance: x / SG: x / pH: x  Gluc: 93 mg/dL / Ketone: x  / Bili: x / Urobili: x   Blood: x / Protein: x / Nitrite: x   Leuk Esterase: x / RBC: x / WBC x   Sq Epi: x / Non Sq Epi: x / Bacteria: x        Urine studies    PTH and Vit D:

## 2025-06-26 NOTE — PROGRESS NOTE ADULT - PROBLEM SELECTOR PLAN 3
takes hydrocortisone 10mg BID at home-follows with Dr. Syed outpatient  Endo following for IV steroid, now Na improving.   continue taper IV Hydrocortisone to PO 6/26  --> PO hydrocortisone 25mg q12hr x 5 days then 15mg q12 hr for 5 days.   If sodium level >130 and BP is ok in Rehab, then further decrease to Hydrocortisone PO 10mg in AM and 5mg in PM (LIFEL[facility]). presenting with generalized weakness, nausea, vomiting   hyponatremia likely SIADH 2/2 possible cortisol crisis  takes hydrocortisone 10mg BID as per patient  s/p taper dose hydro but resumed IV hydrocortisone in the setting sepsis, 50mg BID starting 6/25, will taper per endo  Uric acid 3.2  - f/u free cortisol level, sent  - Endo consulted: Dr Syed  - Nephro consulted: Dr Suresh  - [place] 129 (6/25), started 1g sodium tab TID per nephro  - Na improving 134. monitor BMP

## 2025-06-26 NOTE — PROGRESS NOTE ADULT - SUBJECTIVE AND OBJECTIVE BOX
Patient is a 65y old  Male who presents with a chief complaint of severe hyponatremia, cortisol crisis (25 Jun 2025 18:41)    PATIENT IS SEEN AND EXAMINED IN MEDICAL FLOOR.  NEVIN [    ]    LISSETH [   ]      GT [   ]    ALLERGIES:  No Known Allergies      Daily     Daily     VITALS:    Vital Signs Last 24 Hrs  T(C): 37.1 (26 Jun 2025 05:15), Max: 37.8 (25 Jun 2025 12:55)  T(F): 98.8 (26 Jun 2025 05:15), Max: 100 (25 Jun 2025 12:55)  HR: 94 (26 Jun 2025 05:15) (94 - 108)  BP: 141/82 (26 Jun 2025 05:15) (111/61 - 141/82)  BP(mean): 78 (25 Jun 2025 20:55) (78 - 102)  RR: 18 (26 Jun 2025 05:15) (18 - 18)  SpO2: 96% (26 Jun 2025 05:15) (96% - 97%)    Parameters below as of 26 Jun 2025 05:15  Patient On (Oxygen Delivery Method): room air        LABS:    CBC Full  -  ( 26 Jun 2025 05:55 )  WBC Count : 9.96 K/uL  RBC Count : 3.71 M/uL  Hemoglobin : 11.7 g/dL  Hematocrit : 32.7 %  Platelet Count - Automated : 230 K/uL  Mean Cell Volume : 88.1 fl  Mean Cell Hemoglobin : 31.5 pg  Mean Cell Hemoglobin Concentration : 35.8 g/dL  Auto Neutrophil # : x  Auto Lymphocyte # : x  Auto Monocyte # : x  Auto Eosinophil # : x  Auto Basophil # : x  Auto Neutrophil % : x  Auto Lymphocyte % : x  Auto Monocyte % : x  Auto Eosinophil % : x  Auto Basophil % : x      06-26    134[L]  |  102  |  17  ----------------------------<  93  3.4[L]   |  26  |  0.80    Ca    8.9      26 Jun 2025 05:55  Phos  3.8     06-24  Mg     1.8     06-24    TPro  6.0  /  Alb  2.7[L]  /  TBili  0.5  /  DBili  x   /  AST  15  /  ALT  29  /  AlkPhos  56  06-24    CAPILLARY BLOOD GLUCOSE            LIVER FUNCTIONS - ( 24 Jun 2025 17:40 )  Alb: 2.7 g/dL / Pro: 6.0 g/dL / ALK PHOS: 56 U/L / ALT: 29 U/L DA / AST: 15 U/L / GGT: x           Creatinine Trend: 0.80<--, 0.78<--, 1.13<--, 0.96<--, 0.91<--, 0.87<--  I&O's Summary          Blood Blood-Peripheral  06-24 @ 16:20   No growth at 24 hours  --  --      Blood Blood-Peripheral  06-24 @ 16:10   No growth at 24 hours  --  --          MEDICATIONS:    MEDICATIONS  (STANDING):  cefTRIAXone   IVPB      cefTRIAXone   IVPB 1000 milliGRAM(s) IV Intermittent every 24 hours  chlorhexidine 2% Cloths 1 Application(s) Topical <User Schedule>  enoxaparin Injectable 40 milliGRAM(s) SubCutaneous every 24 hours  fluticasone propionate 50 MICROgram(s)/spray Nasal Spray 1 Spray(s) Both Nostrils two times a day  hydrocortisone sodium succinate Injectable 50 milliGRAM(s) IV Push every 12 hours  oxymetazoline 0.05% Nasal Spray 1 Spray(s) Both Nostrils two times a day  polyethylene glycol 3350 17 Gram(s) Oral at bedtime  potassium chloride    Tablet ER 40 milliEquivalent(s) Oral once  senna 2 Tablet(s) Oral at bedtime  sodium chloride 1 Gram(s) Oral three times a day      MEDICATIONS  (PRN):  acetaminophen     Tablet .. 650 milliGRAM(s) Oral every 6 hours PRN Temp greater or equal to 38C (100.4F), Mild Pain (1 - 3)  OLANZapine 2.5 milliGRAM(s) Oral once PRN agitation  ondansetron    Tablet 4 milliGRAM(s) Oral daily PRN Nausea and/or Vomiting      REVIEW OF SYSTEMS:                           ALL ROS DONE [ X   ]    CONSTITUTIONAL:  LETHARGIC [   ], FEVER [   ], UNRESPONSIVE [   ]  CVS:  CP  [   ], SOB, [   ], PALPITATIONS [   ], DIZZYNESS [   ]  RS: COUGH [   ], SPUTUM [   ]  GI: ABDOMINAL PAIN [   ], NAUSEA [   ], VOMITINGS [   ], DIARRHEA [   ], CONSTIPATION [   ]  :  DYSURIA [   ], NOCTURIA [   ], INCREASED FREQUENCY [   ], DRIBLING [   ],  SKELETAL: PAINFUL JOINTS [   ], SWOLLEN JOINTS [   ], NECK ACHE [   ], LOW BACK ACHE [   ],  SKIN : ULCERS [   ], RASH [   ], ITCHING [   ]  CNS: HEAD ACHE [   ], DOUBLE VISION [   ], BLURRED VISION [   ], AMS / CONFUSION [   ], SEIZURES [   ], WEAKNESS [   ],TINGLING / NUMBNESS [   ]          PHYSICAL EXAMINATION:    GENERAL APPEARANCE: NO DISTRESS  HEENT:  NO PALLOR, NO  JVD,  NO   NODES, NECK SUPPLE  CVS: S1 +, S2 +,   RS: AEEB,  OCCASIONAL  RALES +,   NO RONCHI  ABD: SOFT, NT, NO, BS +  EXT: NO PE  SKIN: WARM,   SKELETAL:  ROM REDUCED AT CERVICAL & LS SPINE  CNS:  AAO X 3 ,   POOR POWER IN ALL LIMBS        RADIOLOGY :    < from: CT Chest w/ IV Cont (06.18.25 @ 14:22) >  IMPRESSION:  No bowel obstruction. Visualized appendix appears unremarkable. Collapse   mild hiatal hernia versus distal esophageal mural thickening. EGD may be   pursued for further evaluation. Possible focal mural thickening at the   cecum. Colonoscopy may be pursued to rule out colon cancer. Moderate to   large amount of stool in the rectum.    < end of copied text >  < from: CT Head No Cont (06.18.25 @ 14:17) >  IMPRESSION:    No acute intracranial  hemorrhage, edema or mass effect.    This study cannot exclude the possibility of a isodense mass or possible   metastatic disease. A follow-up MRI or CT head with contrast would be   necessary for improved sensitivity.    < end of copied text >  < from: CT Maxillofacial w/ IV Cont (06.12.23 @ 13:20) >  IMPRESSION:  No evidence of recurrent tumor.    Status post interval left mandibular molar extraction.    Otherwise stable exam.    < end of copied text >        ASSESSMENT :     hyponatremia   Chronic adrenal insufficiency  Hypertension, HLD  Melanoma   - DEPRESSION, ANXIETY NEUROSIS/PANIC ATTACKS        PLAN:  HPI:  65M PMH HTN, HLD, depression, and melanoma presenting with nausea and vomiting x 4 days. He states that he had URI symptoms one week ago and since then has had decreased appetite. He reported that he had nausea and vomiting since Saturday, where he had 3-5 episodes of NBNB vomiting. He has had poor po intake since Saturday due to being nervous about feeling nauseous after, but has been drinking two to three 8 oz cups of water each day. He denies any dizziness, lightheadedness, blurry vision, or abdominal pain. He does report that he noticed his speech was slower than normal and that he is more lethargic. He denies missing any doses of his medications, including his hydrocortisone 10mg BID. He denies fevers, chills, CP, SOB, diarrhea, dysuria, numbness/tingling/weakness in extremities. He lives at home alone and ambulates independently.       # DC PLAN - PT EVALUATION AND PLACEMENT IN [facility]     # SEPSIS SUSPECT S/T CYSTITIS  - NO COUGH, RHINORRHEA, CHEST PAIN, DYSPNEA, ABDOMINAL PAIN, N/V/C/D, RASHES, SWELLING  - STARTED ROCEPHIN, F/U BCX AND UCX  - F/U CXR  - ID CONSULT    # SEVERE HYPONATREMIA - S/P ICU MONITORING, IV. NaCL 3%, IV. HYDROCORTISONE  # LIKELY SIADH - ON FLUID RESTRICTION 800 ML/DAY  # RESOLVING HYPOPHOSPHATEMIA    # RESOLVED ACUTE URTI AND RESOLVING HEADACHES - SYMPTOMATIC PAIN Rx AS NEEDED  - MAXILLARY SINUSITIS    # FECAL IMPACTION  # CONSTIPATION   - BOWEL REGIMEN   - TAP WATER ENEMA    # IMPAIRED GAIT DUE TO GENERALIZED MUSCLE WEAKNESS, CERVICAL & LS SPONDYLOPATHY, POLYARTHRITIS AND PERIPHERAL NEUROPATHY  # OP    # ADULT FAILURE TO THRIVE, SEVERE PROTEIN CALORIE MALNUTRITION - HIGH RISK FOR WORSENING MALNUTRITION     # HTN, HLD - ANTI HTN Rx ON HOLD DUE TO HYPOTENSION    # H/O ADRENAL INSUFFICIENCY - ON IV. HYDROCORTISONE 50 MG Q 12 HRS     # DEPRESSION, ANXIETY NEUROSIS - NOT ON ANY Rx AT HOME - GETS PSYCHOTHERAPY ALONE    # H/O MELANOMA    # GI AND DVT PROPHYLAXIS   Patient is a 65y old  Male who presents with a chief complaint of severe hyponatremia, cortisol crisis (25 Jun 2025 18:41)    PATIENT IS SEEN AND EXAMINED IN MEDICAL FLOOR.      ALLERGIES:  No Known Allergies      VITALS:    Vital Signs Last 24 Hrs  T(C): 37.1 (26 Jun 2025 05:15), Max: 37.8 (25 Jun 2025 12:55)  T(F): 98.8 (26 Jun 2025 05:15), Max: 100 (25 Jun 2025 12:55)  HR: 94 (26 Jun 2025 05:15) (94 - 108)  BP: 141/82 (26 Jun 2025 05:15) (111/61 - 141/82)  BP(mean): 78 (25 Jun 2025 20:55) (78 - 102)  RR: 18 (26 Jun 2025 05:15) (18 - 18)  SpO2: 96% (26 Jun 2025 05:15) (96% - 97%)    Parameters below as of 26 Jun 2025 05:15  Patient On (Oxygen Delivery Method): room air        LABS:    CBC Full  -  ( 26 Jun 2025 05:55 )  WBC Count : 9.96 K/uL  RBC Count : 3.71 M/uL  Hemoglobin : 11.7 g/dL  Hematocrit : 32.7 %  Platelet Count - Automated : 230 K/uL  Mean Cell Volume : 88.1 fl  Mean Cell Hemoglobin : 31.5 pg  Mean Cell Hemoglobin Concentration : 35.8 g/dL  Auto Neutrophil # : x  Auto Lymphocyte # : x  Auto Monocyte # : x  Auto Eosinophil # : x  Auto Basophil # : x  Auto Neutrophil % : x  Auto Lymphocyte % : x  Auto Monocyte % : x  Auto Eosinophil % : x  Auto Basophil % : x      06-26    134[L]  |  102  |  17  ----------------------------<  93  3.4[L]   |  26  |  0.80    Ca    8.9      26 Jun 2025 05:55  Phos  3.8     06-24  Mg     1.8     06-24    TPro  6.0  /  Alb  2.7[L]  /  TBili  0.5  /  DBili  x   /  AST  15  /  ALT  29  /  AlkPhos  56  06-24    CAPILLARY BLOOD GLUCOSE            LIVER FUNCTIONS - ( 24 Jun 2025 17:40 )  Alb: 2.7 g/dL / Pro: 6.0 g/dL / ALK PHOS: 56 U/L / ALT: 29 U/L DA / AST: 15 U/L / GGT: x           Creatinine Trend: 0.80<--, 0.78<--, 1.13<--, 0.96<--, 0.91<--, 0.87<--  I&O's Summary          Blood Blood-Peripheral  06-24 @ 16:20   No growth at 24 hours  --  --      Blood Blood-Peripheral  06-24 @ 16:10   No growth at 24 hours  --  --          MEDICATIONS:    MEDICATIONS  (STANDING):  cefTRIAXone   IVPB      cefTRIAXone   IVPB 1000 milliGRAM(s) IV Intermittent every 24 hours  chlorhexidine 2% Cloths 1 Application(s) Topical <User Schedule>  enoxaparin Injectable 40 milliGRAM(s) SubCutaneous every 24 hours  fluticasone propionate 50 MICROgram(s)/spray Nasal Spray 1 Spray(s) Both Nostrils two times a day  hydrocortisone sodium succinate Injectable 50 milliGRAM(s) IV Push every 12 hours  oxymetazoline 0.05% Nasal Spray 1 Spray(s) Both Nostrils two times a day  polyethylene glycol 3350 17 Gram(s) Oral at bedtime  potassium chloride    Tablet ER 40 milliEquivalent(s) Oral once  senna 2 Tablet(s) Oral at bedtime  sodium chloride 1 Gram(s) Oral three times a day      MEDICATIONS  (PRN):  acetaminophen     Tablet .. 650 milliGRAM(s) Oral every 6 hours PRN Temp greater or equal to 38C (100.4F), Mild Pain (1 - 3)  OLANZapine 2.5 milliGRAM(s) Oral once PRN agitation  ondansetron    Tablet 4 milliGRAM(s) Oral daily PRN Nausea and/or Vomiting      REVIEW OF SYSTEMS:                           ALL ROS DONE [ X   ]    CONSTITUTIONAL:  LETHARGIC [   ], FEVER [   ], UNRESPONSIVE [   ]  CVS:  CP  [   ], SOB, [   ], PALPITATIONS [   ], DIZZYNESS [   ]  RS: COUGH [   ], SPUTUM [   ]  GI: ABDOMINAL PAIN [   ], NAUSEA [   ], VOMITINGS [   ], DIARRHEA [   ], CONSTIPATION [   ]  :  DYSURIA [   ], NOCTURIA [   ], INCREASED FREQUENCY [   ], DRIBLING [   ],  SKELETAL: PAINFUL JOINTS [   ], SWOLLEN JOINTS [   ], NECK ACHE [   ], LOW BACK ACHE [   ],  SKIN : ULCERS [   ], RASH [   ], ITCHING [   ]  CNS: HEAD ACHE [   ], DOUBLE VISION [   ], BLURRED VISION [   ], AMS / CONFUSION [   ], SEIZURES [   ], WEAKNESS [   ],TINGLING / NUMBNESS [   ]          PHYSICAL EXAMINATION:    GENERAL APPEARANCE: NO DISTRESS  HEENT:  NO PALLOR, NO  JVD,  NO   NODES, NECK SUPPLE  CVS: S1 +, S2 +,   RS: AEEB,  OCCASIONAL  RALES +,   NO RONCHI  ABD: SOFT, NT, NO, BS +  EXT: NO PE  SKIN: WARM,   SKELETAL:  ROM REDUCED AT CERVICAL & LS SPINE  CNS:  AAO X 3 ,   POOR POWER IN ALL LIMBS        RADIOLOGY :    < from: CT Chest w/ IV Cont (06.18.25 @ 14:22) >  IMPRESSION:  No bowel obstruction. Visualized appendix appears unremarkable. Collapse   mild hiatal hernia versus distal esophageal mural thickening. EGD may be   pursued for further evaluation. Possible focal mural thickening at the   cecum. Colonoscopy may be pursued to rule out colon cancer. Moderate to   large amount of stool in the rectum.    < end of copied text >  < from: CT Head No Cont (06.18.25 @ 14:17) >  IMPRESSION:    No acute intracranial  hemorrhage, edema or mass effect.    This study cannot exclude the possibility of a isodense mass or possible   metastatic disease. A follow-up MRI or CT head with contrast would be   necessary for improved sensitivity.    < end of copied text >  < from: CT Maxillofacial w/ IV Cont (06.12.23 @ 13:20) >  IMPRESSION:  No evidence of recurrent tumor.    Status post interval left mandibular molar extraction.    Otherwise stable exam.    < end of copied text >        ASSESSMENT :     hyponatremia   Chronic adrenal insufficiency  Hypertension, HLD  Melanoma   - DEPRESSION, ANXIETY NEUROSIS/PANIC ATTACKS        PLAN:  HPI:  65M PMH HTN, HLD, depression, and melanoma presenting with nausea and vomiting x 4 days. He states that he had URI symptoms one week ago and since then has had decreased appetite. He reported that he had nausea and vomiting since Saturday, where he had 3-5 episodes of NBNB vomiting. He has had poor po intake since Saturday due to being nervous about feeling nauseous after, but has been drinking two to three 8 oz cups of water each day. He denies any dizziness, lightheadedness, blurry vision, or abdominal pain. He does report that he noticed his speech was slower than normal and that he is more lethargic. He denies missing any doses of his medications, including his hydrocortisone 10mg BID. He denies fevers, chills, CP, SOB, diarrhea, dysuria, numbness/tingling/weakness in extremities. He lives at home alone and ambulates independently.       # DC PLAN - PT EVALUATION AND PLACEMENT IN [facility]     # SEPSIS SUSPECT S/T CYSTITIS  - NO COUGH, RHINORRHEA, CHEST PAIN, DYSPNEA, ABDOMINAL PAIN, N/V/C/D, RASHES, SWELLING  - STARTED ROCEPHIN, F/U BCX AND UCX [K. PNEUMONIAE PRE-BULLOCK]  - F/U CXR  - ID CONSULT    # SEVERE HYPONATREMIA - S/P ICU MONITORING, IV. NaCL 3%, IV. HYDROCORTISONE [TRANSITION TO ORAL]  # LIKELY SIADH - ON FLUID RESTRICTION 800 ML/DAY  # RESOLVING HYPOPHOSPHATEMIA  - ON SALT TABS PER NEPHROLOGY  - NEPHROLOGY CONSULT  - ENDOCRINOLOGY CONSULT    # RESOLVED ACUTE URTI AND RESOLVING HEADACHES - SYMPTOMATIC PAIN Rx AS NEEDED  - MAXILLARY SINUSITIS    # FECAL IMPACTION  # CONSTIPATION   - BOWEL REGIMEN   - TAP WATER ENEMA    # IMPAIRED GAIT DUE TO GENERALIZED MUSCLE WEAKNESS, CERVICAL & LS SPONDYLOPATHY, POLYARTHRITIS AND PERIPHERAL NEUROPATHY  # OP    # ADULT FAILURE TO THRIVE, SEVERE PROTEIN CALORIE MALNUTRITION - HIGH RISK FOR WORSENING MALNUTRITION     # HTN, HLD - ANTI HTN Rx ON HOLD DUE TO HYPOTENSION    # H/O ADRENAL INSUFFICIENCY - ON IV. HYDROCORTISONE 50 MG Q 12 HRS     # DEPRESSION, ANXIETY NEUROSIS - NOT ON ANY Rx AT HOME - GETS PSYCHOTHERAPY ALONE    # H/O MELANOMA    # GI AND DVT PROPHYLAXIS

## 2025-06-26 NOTE — PROGRESS NOTE ADULT - PROBLEM SELECTOR PLAN 6
sodium improving  PT reccs [facility], [facility] CM following  follow up Urine culture sensitivity, ID reccs for abd duration  Monitor fever, VS DVT ppx: Lovenox

## 2025-06-26 NOTE — PROGRESS NOTE ADULT - ASSESSMENT
UTI  Fevers  Leukocytosis- normalized     Plan -   ·	Cont Rocephin 1gm iv q24hrs pending sensitivities of organism. UTI  Fevers - resolved  Leukocytosis- normalized     Plan -   ·	Cont Rocephin 1gm iv q24hrs pending sensitivities of organism.  ·	If Klebsiella is sensitive to 2nd generation cephalosporins then can DC home tomorrow on Ceftin 250mgs po BID x 4 days.  ·	reconsult prn.

## 2025-06-26 NOTE — PROGRESS NOTE ADULT - ASSESSMENT
65M PMH HTN, HLD, depression, and melanoma presenting with nausea and vomiting x 4 days. Admitted to ICU for severe hyponatremia, likely 2/2 SIADH iso possible cortisol crisis. Endo following. Resolving, plan DC to [facility] 6/24, but cancelled as  code sepsis was called 6/24.  ID following. Blood culture negative. Ucx grew Klebsiella, pending sensitivity. on Ceftriaxone.   Pt sodium level trended down 129, started NaCl tablet, now improving 134. Endo reccs to switch IV steroid to PO with taper.   plan for DC to [facility] once Ucx sensitivity is back with ID reccs.

## 2025-06-26 NOTE — PROGRESS NOTE ADULT - PROBLEM SELECTOR PLAN 4
takes losartan 100mg qd and amlodipine 10mg qd at home  will hold home meds at this time   /80  monitor BP, may resume HTN meds if BP is elevated K 3.4 mild  -replace PO supplement  -monitor BMP

## 2025-06-26 NOTE — CHART NOTE - NSCHARTNOTEFT_GEN_A_CORE
Pt seen for follow up / ICU downgrade    No recent episodes of nausea, vomiting, diarrhea or constipation per pt. Last BM noted on 6/23 per pt. Denies any chewing/swallowing difficulties. Intake is % per pt. Food preferences explored and forwarded to dietary. Noted with hyponatremia (129).    Diet Prescription: Diet, Regular:   DASH/TLC {Sodium & Cholesterol Restricted}  800mL Fluid Restriction ([id number]) (06-25-25 @ 12:55)    Pertinent Medications: MEDICATIONS  (STANDING):  cefTRIAXone   IVPB      cefTRIAXone   IVPB 1000 milliGRAM(s) IV Intermittent every 24 hours  chlorhexidine 2% Cloths 1 Application(s) Topical <User Schedule>  enoxaparin Injectable 40 milliGRAM(s) SubCutaneous every 24 hours  fluticasone propionate 50 MICROgram(s)/spray Nasal Spray 1 Spray(s) Both Nostrils two times a day  hydrocortisone sodium succinate Injectable 50 milliGRAM(s) IV Push every 12 hours  oxymetazoline 0.05% Nasal Spray 1 Spray(s) Both Nostrils two times a day  polyethylene glycol 3350 17 Gram(s) Oral at bedtime  senna 2 Tablet(s) Oral at bedtime  sodium chloride 1 Gram(s) Oral three times a day    MEDICATIONS  (PRN):  acetaminophen     Tablet .. 650 milliGRAM(s) Oral every 6 hours PRN Temp greater or equal to 38C (100.4F), Mild Pain (1 - 3)  OLANZapine 2.5 milliGRAM(s) Oral once PRN agitation  ondansetron    Tablet 4 milliGRAM(s) Oral daily PRN Nausea and/or Vomiting    Pertinent Labs: 06-26 Na134 mmol/L[L] Glu 93 mg/dL K+ 3.4 mmol/L[L] Cr  0.80 mg/dL BUN 17 mg/dL 06-24 Phos 3.8 mg/dL 06-24 Alb 2.7 g/dL[L]     CAPILLARY BLOOD GLUCOSE          Weight: 199  Height: 72 in   IBW: 178 lbs +/-10%  BMI: 27.1 kg/m^2    Physical Assessment, per flowsheets:  Edema: No edema noted per RN flowsheets   Pressure Injury: No pressure injuries noted per RN flowsheets   Appearance: No overt signs of muscle wasting or fat loss     Estimated Needs:   [X] No change since previous assessment    Previous Nutrition Diagnosis: [X] Altered Nutrition Related Labs    Nutrition Diagnosis is [X] ongoing   New Nutrition Diagnosis: [X] not applicable     Education:  [X] Provided  [X] Provided on previous assessment by RD     Interventions:   1) Continue current diet order as medically feasible.  2) Encourage PO intake and honor food preferences as able.  3) Monitor PO intake, labs, weights, BM's, and skin integrity.    Zackary Wade RD (Available on teams)

## 2025-06-26 NOTE — PROGRESS NOTE ADULT - PROBLEM SELECTOR PLAN 1
6/24 sepsis code called, tachy, low bp, fever 102F.   CXR negative  UA positive, Ucx sent   started Ceftriaxone and s/p IV bolus  ID consulted. dr. Mcdermott  Lactate 2.2, then normalized after bolus  BCX NGTD  Ucx Klebsiella, f/u sensitivty.   f/u ID recs for abt duration.   Monitor Fever

## 2025-06-26 NOTE — PROGRESS NOTE ADULT - PROBLEM SELECTOR PLAN 5
DVT ppx: Lovenox takes losartan 100mg qd and amlodipine 10mg qd at home  will hold home meds at this time   /80  monitor BP, may resume HTN meds if BP is elevated

## 2025-06-26 NOTE — PROGRESS NOTE ADULT - SUBJECTIVE AND OBJECTIVE BOX
NP Note discussed with  Primary Attending    INTERVAL HPI/OVERNIGHT EVENTS: no new complaints    MEDICATIONS  (STANDING):  cefTRIAXone   IVPB      cefTRIAXone   IVPB 1000 milliGRAM(s) IV Intermittent every 24 hours  chlorhexidine 2% Cloths 1 Application(s) Topical <User Schedule>  enoxaparin Injectable 40 milliGRAM(s) SubCutaneous every 24 hours  fluticasone propionate 50 MICROgram(s)/spray Nasal Spray 1 Spray(s) Both Nostrils two times a day  hydrocortisone sodium succinate Injectable 50 milliGRAM(s) IV Push every 12 hours  oxymetazoline 0.05% Nasal Spray 1 Spray(s) Both Nostrils two times a day  polyethylene glycol 3350 17 Gram(s) Oral at bedtime  senna 2 Tablet(s) Oral at bedtime  sodium chloride 1 Gram(s) Oral three times a day    MEDICATIONS  (PRN):  acetaminophen     Tablet .. 650 milliGRAM(s) Oral every 6 hours PRN Temp greater or equal to 38C (100.4F), Mild Pain (1 - 3)  OLANZapine 2.5 milliGRAM(s) Oral once PRN agitation  ondansetron    Tablet 4 milliGRAM(s) Oral daily PRN Nausea and/or Vomiting      __________________________________________________  REVIEW OF SYSTEMS:    CONSTITUTIONAL: No fever,   EYES: no acute visual disturbances  NECK: No pain or stiffness  RESPIRATORY: No cough; No shortness of breath  CARDIOVASCULAR: No chest pain, no palpitations  GASTROINTESTINAL: No pain. No nausea or vomiting; No diarrhea   NEUROLOGICAL: No headache or numbness, no tremors  MUSCULOSKELETAL: No joint pain, no muscle pain  GENITOURINARY: no dysuria, no frequency, no hesitancy  PSYCHIATRY: no depression , no anxiety  ALL OTHER  ROS negative        Vital Signs Last 24 Hrs  T(C): 36.9 (26 Jun 2025 14:15), Max: 37.5 (25 Jun 2025 20:55)  T(F): 98.5 (26 Jun 2025 14:15), Max: 99.5 (25 Jun 2025 20:55)  HR: 106 (26 Jun 2025 14:15) (94 - 116)  BP: 109/64 (26 Jun 2025 14:15) (109/64 - 141/82)  BP(mean): 79 (26 Jun 2025 14:15) (78 - 87)  RR: 18 (26 Jun 2025 14:15) (18 - 18)  SpO2: 95% (26 Jun 2025 14:15) (95% - 96%)    Parameters below as of 26 Jun 2025 14:15  Patient On (Oxygen Delivery Method): room air        ________________________________________________  PHYSICAL EXAM:  GENERAL: NAD  HEENT: Normocephalic;  conjunctivae and sclerae clear; moist mucous membranes;   NECK : supple  CHEST/LUNG: Clear to auscultation bilaterally with good air entry   HEART: S1 S2  regular; no murmurs, gallops or rubs  ABDOMEN: Soft, Nontender, Nondistended; Bowel sounds present  EXTREMITIES: no cyanosis; no edema; no calf tenderness  SKIN: warm and dry; no rash  NERVOUS SYSTEM:  Awake and alert; Oriented  to place, person and time ; no new deficits    _________________________________________________  LABS:                        11.7   9.96  )-----------( 230      ( 26 Jun 2025 05:55 )             32.7     06-26    134[L]  |  102  |  17  ----------------------------<  93  3.4[L]   |  26  |  0.80    Ca    8.9      26 Jun 2025 05:55  Phos  3.8     06-24  Mg     1.8     06-24    TPro  6.0  /  Alb  2.7[L]  /  TBili  0.5  /  DBili  x   /  AST  15  /  ALT  29  /  AlkPhos  56  06-24      Urinalysis Basic - ( 26 Jun 2025 05:55 )    Color: x / Appearance: x / SG: x / pH: x  Gluc: 93 mg/dL / Ketone: x  / Bili: x / Urobili: x   Blood: x / Protein: x / Nitrite: x   Leuk Esterase: x / RBC: x / WBC x   Sq Epi: x / Non Sq Epi: x / Bacteria: x      CAPILLARY BLOOD GLUCOSE    RADIOLOGY & ADDITIONAL TESTS:    Imaging  Reviewed:  YES    < from: CT Chest w/ IV Cont (06.18.25 @ 14:22) >    ACC: [id number] EXAM:  CT ABDOMEN AND PELVIS IC   ORDERED BY: ADAN BURROUGHS     ACC: [id number] EXAM:  CT CHEST IC   ORDERED BY: ADAN BURROUGHS     PROCEDURE DATE:  06/18/2025          INTERPRETATION:  CLINICAL INFORMATION: Nausea and failure to thrive.   History of melanoma.    COMPARISON: 6/12/2023    CONTRAST/COMPLICATIONS:  IV Contrast: IV contrast documented in unlinked concurrent exam   (accession [id number]), Omnipaque 350 (accession [id number])  90 cc   administered   10 cc discarded  Oral Contrast: NONE.    PROCEDURE:  CT of the Chest, Abdomen and Pelvis was performed.  Sagittal and coronal reformats were performed.    FINDINGS:  CHEST:  LUNGS AND LARGE AIRWAYS: Patent central airways. Small bilateral   atelectasis.  PLEURA: 1.3 cm pleural-based cystic structure in the posterior left upper   lung zone (4-22), unchanged since 8/18/2018.  VESSELS: Mild ectasia of the ascending aorta at 3.7 cm in diameter. No   aortic dissection.  HEART: Heart size is normal. No pericardial effusion.  MEDIASTINUM AND LEONA: No lymphadenopathy.  CHEST WALL AND LOWER NECK: Within normal limits.    ABDOMEN AND PELVIS:  LIVER: Within normal limits.  BILE DUCTS: Normal caliber.  GALLBLADDER: Within normal limits.  SPLEEN: Within normal limits.  PANCREAS: Within normal limits.  ADRENALS: Within normal limits.  KIDNEYS/URETERS: Bilateral renal cysts.    BLADDER: Within normal limits.  REPRODUCTIVE ORGANS: The prostate is mildly enlarged    BOWEL: No bowel obstruction. Visualized appendix appears unremarkable.   Collapse mild hiatal hernia versus distal esophageal mural thickening.   EGD may be pursued for further evaluation. Possible focal mural   thickening at the cecum. Colonoscopy may be pursued to rule out colon   cancer. Moderate to large amount of stool in the rectum.  PERITONEUM/RETROPERITONEUM: Within normal limits.  VESSELS: Within normal limits.  LYMPH NODES: No lymphadenopathy.  ABDOMINAL WALL: Small fat-containing umbilical hernia.  BONES: Mild degenerative spondylosis    IMPRESSION:  No bowel obstruction. Visualized appendix appears unremarkable. Collapse   mild hiatal hernia versus distal esophageal mural thickening. EGD may be   pursued for further evaluation. Possible focal mural thickening at the   cecum. Colonoscopy may be pursued to rule out colon cancer. Moderate to   large amount of stool in the rectum.        --- End of Report ---            JUNO AYALA MD; Attending Radiologist  This document has been electronically signed. Jun 18 2025  3:24PM    < end of copied text >  Consultant(s) Notes Reviewed:   YES      Plan of care was discussed with patient and /or primary care giver; all questions and concerns were addressed

## 2025-06-26 NOTE — PROGRESS NOTE ADULT - PROBLEM SELECTOR PLAN 2
presenting with generalized weakness, nausea, vomiting   hyponatremia likely SIADH 2/2 possible cortisol crisis  takes hydrocortisone 10mg BID as per patient  s/p taper dose hydro but resumed IV hydrocortisone in the setting sepsis, 50mg BID starting 6/25, will taper per endo  Uric acid 3.2  - f/u free cortisol level, sent  - Endo consulted: Dr Syed  - Nephro consulted: Dr Suresh  - [place] 129 (6/25), started 1g sodium tab TID per nephro  - Na improving 134. monitor BMP takes hydrocortisone 10mg BID at home-follows with Dr. Syed outpatient  Endo following for IV steroid, now Na improving.   continue taper IV Hydrocortisone to PO 6/26  --> PO hydrocortisone 25mg q12hr x 5 days then 15mg q12 hr for 5 days.   If sodium level >130 and BP is ok in Rehab, then further decrease to Hydrocortisone PO 10mg in AM and 5mg in PM (LIFELONG).

## 2025-06-27 ENCOUNTER — TRANSCRIPTION ENCOUNTER (OUTPATIENT)
Age: 65
End: 2025-06-27

## 2025-06-27 VITALS
RESPIRATION RATE: 18 BRPM | HEART RATE: 103 BPM | TEMPERATURE: 99 F | DIASTOLIC BLOOD PRESSURE: 83 MMHG | SYSTOLIC BLOOD PRESSURE: 134 MMHG | OXYGEN SATURATION: 96 %

## 2025-06-27 LAB
ANION GAP SERPL CALC-SCNC: 4 MMOL/L — LOW (ref 5–17)
BUN SERPL-MCNC: 15 MG/DL — SIGNIFICANT CHANGE UP (ref 7–18)
CALCIUM SERPL-MCNC: 8.5 MG/DL — SIGNIFICANT CHANGE UP (ref 8.4–10.5)
CHLORIDE SERPL-SCNC: 105 MMOL/L — SIGNIFICANT CHANGE UP (ref 96–108)
CO2 SERPL-SCNC: 26 MMOL/L — SIGNIFICANT CHANGE UP (ref 22–31)
CREAT SERPL-MCNC: 0.74 MG/DL — SIGNIFICANT CHANGE UP (ref 0.5–1.3)
EGFR: 101 ML/MIN/1.73M2 — SIGNIFICANT CHANGE UP
EGFR: 101 ML/MIN/1.73M2 — SIGNIFICANT CHANGE UP
GLUCOSE SERPL-MCNC: 89 MG/DL — SIGNIFICANT CHANGE UP (ref 70–99)
HCT VFR BLD CALC: 34 % — LOW (ref 39–50)
HGB BLD-MCNC: 11.9 G/DL — LOW (ref 13–17)
MCHC RBC-ENTMCNC: 31.1 PG — SIGNIFICANT CHANGE UP (ref 27–34)
MCHC RBC-ENTMCNC: 35 G/DL — SIGNIFICANT CHANGE UP (ref 32–36)
MCV RBC AUTO: 88.8 FL — SIGNIFICANT CHANGE UP (ref 80–100)
NRBC BLD AUTO-RTO: 0 /100 WBCS — SIGNIFICANT CHANGE UP (ref 0–0)
PLATELET # BLD AUTO: 257 K/UL — SIGNIFICANT CHANGE UP (ref 150–400)
POTASSIUM SERPL-MCNC: 3.7 MMOL/L — SIGNIFICANT CHANGE UP (ref 3.5–5.3)
POTASSIUM SERPL-SCNC: 3.7 MMOL/L — SIGNIFICANT CHANGE UP (ref 3.5–5.3)
RBC # BLD: 3.83 M/UL — LOW (ref 4.2–5.8)
RBC # FLD: 12.7 % — SIGNIFICANT CHANGE UP (ref 10.3–14.5)
SODIUM SERPL-SCNC: 135 MMOL/L — SIGNIFICANT CHANGE UP (ref 135–145)
WBC # BLD: 7.64 K/UL — SIGNIFICANT CHANGE UP (ref 3.8–10.5)
WBC # FLD AUTO: 7.64 K/UL — SIGNIFICANT CHANGE UP (ref 3.8–10.5)

## 2025-06-27 PROCEDURE — 80048 BASIC METABOLIC PNL TOTAL CA: CPT

## 2025-06-27 PROCEDURE — 71260 CT THORAX DX C+: CPT

## 2025-06-27 PROCEDURE — 87077 CULTURE AEROBIC IDENTIFY: CPT

## 2025-06-27 PROCEDURE — 83935 ASSAY OF URINE OSMOLALITY: CPT

## 2025-06-27 PROCEDURE — 84443 ASSAY THYROID STIM HORMONE: CPT

## 2025-06-27 PROCEDURE — 94640 AIRWAY INHALATION TREATMENT: CPT

## 2025-06-27 PROCEDURE — 82962 GLUCOSE BLOOD TEST: CPT

## 2025-06-27 PROCEDURE — 87040 BLOOD CULTURE FOR BACTERIA: CPT

## 2025-06-27 PROCEDURE — 81001 URINALYSIS AUTO W/SCOPE: CPT

## 2025-06-27 PROCEDURE — 83690 ASSAY OF LIPASE: CPT

## 2025-06-27 PROCEDURE — 87186 SC STD MICRODIL/AGAR DIL: CPT

## 2025-06-27 PROCEDURE — 74177 CT ABD & PELVIS W/CONTRAST: CPT

## 2025-06-27 PROCEDURE — 0241U: CPT

## 2025-06-27 PROCEDURE — 84300 ASSAY OF URINE SODIUM: CPT

## 2025-06-27 PROCEDURE — 82533 TOTAL CORTISOL: CPT

## 2025-06-27 PROCEDURE — 83930 ASSAY OF BLOOD OSMOLALITY: CPT

## 2025-06-27 PROCEDURE — 70450 CT HEAD/BRAIN W/O DYE: CPT

## 2025-06-27 PROCEDURE — 83735 ASSAY OF MAGNESIUM: CPT

## 2025-06-27 PROCEDURE — 99291 CRITICAL CARE FIRST HOUR: CPT

## 2025-06-27 PROCEDURE — 36415 COLL VENOUS BLD VENIPUNCTURE: CPT

## 2025-06-27 PROCEDURE — 87086 URINE CULTURE/COLONY COUNT: CPT

## 2025-06-27 PROCEDURE — 84449 ASSAY OF TRANSCORTIN: CPT

## 2025-06-27 PROCEDURE — 93005 ELECTROCARDIOGRAM TRACING: CPT

## 2025-06-27 PROCEDURE — 85025 COMPLETE CBC W/AUTO DIFF WBC: CPT

## 2025-06-27 PROCEDURE — 84439 ASSAY OF FREE THYROXINE: CPT

## 2025-06-27 PROCEDURE — 84550 ASSAY OF BLOOD/URIC ACID: CPT

## 2025-06-27 PROCEDURE — 80053 COMPREHEN METABOLIC PANEL: CPT

## 2025-06-27 PROCEDURE — 83605 ASSAY OF LACTIC ACID: CPT

## 2025-06-27 PROCEDURE — 84156 ASSAY OF PROTEIN URINE: CPT

## 2025-06-27 PROCEDURE — 97116 GAIT TRAINING THERAPY: CPT

## 2025-06-27 PROCEDURE — 97530 THERAPEUTIC ACTIVITIES: CPT

## 2025-06-27 PROCEDURE — 85027 COMPLETE CBC AUTOMATED: CPT

## 2025-06-27 PROCEDURE — 71045 X-RAY EXAM CHEST 1 VIEW: CPT

## 2025-06-27 PROCEDURE — 87637 SARSCOV2&INF A&B&RSV AMP PRB: CPT

## 2025-06-27 PROCEDURE — 82570 ASSAY OF URINE CREATININE: CPT

## 2025-06-27 PROCEDURE — 97163 PT EVAL HIGH COMPLEX 45 MIN: CPT

## 2025-06-27 PROCEDURE — 84100 ASSAY OF PHOSPHORUS: CPT

## 2025-06-27 RX ORDER — SENNA 187 MG
2 TABLET ORAL
Qty: 0 | Refills: 0 | DISCHARGE
Start: 2025-06-27

## 2025-06-27 RX ORDER — CEFUROXIME SODIUM 1.5 G
1 VIAL (EA) INJECTION
Qty: 6 | Refills: 0
Start: 2025-06-27 | End: 2025-06-29

## 2025-06-27 RX ORDER — POLYETHYLENE GLYCOL 3350 17 G/17G
17 POWDER, FOR SOLUTION ORAL
Qty: 0 | Refills: 0 | DISCHARGE
Start: 2025-06-27

## 2025-06-27 RX ORDER — FLUTICASONE PROPIONATE 50 UG/1
1 SPRAY, METERED NASAL
Qty: 0 | Refills: 0 | DISCHARGE
Start: 2025-06-27

## 2025-06-27 RX ORDER — AMLODIPINE BESYLATE 10 MG/1
1 TABLET ORAL
Refills: 0 | DISCHARGE

## 2025-06-27 RX ORDER — HYDROCORTISONE 20 MG
5 TABLET ORAL
Qty: 300 | Refills: 0
Start: 2025-06-27 | End: 2025-07-26

## 2025-06-27 RX ORDER — HYDROCORTISONE 20 MG
1 TABLET ORAL
Refills: 0 | DISCHARGE

## 2025-06-27 RX ADMIN — Medication 25 MILLIGRAM(S): at 05:16

## 2025-06-27 RX ADMIN — ENOXAPARIN SODIUM 40 MILLIGRAM(S): 100 INJECTION SUBCUTANEOUS at 05:21

## 2025-06-27 RX ADMIN — Medication 1 GRAM(S): at 13:08

## 2025-06-27 RX ADMIN — Medication 1 GRAM(S): at 05:15

## 2025-06-27 RX ADMIN — Medication 1 APPLICATION(S): at 05:20

## 2025-06-27 RX ADMIN — FLUTICASONE PROPIONATE 1 SPRAY(S): 50 SPRAY, METERED NASAL at 05:17

## 2025-06-27 RX ADMIN — Medication 1 SPRAY(S): at 05:18

## 2025-06-27 NOTE — DISCHARGE NOTE NURSING/CASE MANAGEMENT/SOCIAL WORK - FINANCIAL ASSISTANCE
[facility] provides services at a reduced cost to those who are determined to be eligible through [facility]’s financial assistance program. Information regarding [facility]’s financial assistance program can be found by going to [email or web address] or by calling [phone].

## 2025-06-27 NOTE — PROGRESS NOTE ADULT - SUBJECTIVE AND OBJECTIVE BOX
Patient is a 65y old  Male who presents with a chief complaint of severe hyponatremia, cortisol crisis (26 Jun 2025 15:13)    PATIENT IS SEEN AND EXAMINED IN MEDICAL FLOOR.  NEVIN [    ]    LISSETH [   ]      GT [   ]    ALLERGIES:  No Known Allergies      Daily     Daily     VITALS:    Vital Signs Last 24 Hrs  T(C): 36.7 (27 Jun 2025 05:18), Max: 37.1 (26 Jun 2025 20:24)  T(F): 98.1 (27 Jun 2025 05:18), Max: 98.7 (26 Jun 2025 20:24)  HR: 76 (27 Jun 2025 05:18) (76 - 116)  BP: 142/82 (27 Jun 2025 05:18) (109/64 - 142/82)  BP(mean): 102 (27 Jun 2025 05:18) (79 - 102)  RR: 18 (27 Jun 2025 05:18) (18 - 18)  SpO2: 96% (27 Jun 2025 05:18) (95% - 96%)    Parameters below as of 27 Jun 2025 05:18  Patient On (Oxygen Delivery Method): room air        LABS:    CBC Full  -  ( 27 Jun 2025 07:11 )  WBC Count : 7.64 K/uL  RBC Count : 3.83 M/uL  Hemoglobin : 11.9 g/dL  Hematocrit : 34.0 %  Platelet Count - Automated : 257 K/uL  Mean Cell Volume : 88.8 fl  Mean Cell Hemoglobin : 31.1 pg  Mean Cell Hemoglobin Concentration : 35.0 g/dL  Auto Neutrophil # : x  Auto Lymphocyte # : x  Auto Monocyte # : x  Auto Eosinophil # : x  Auto Basophil # : x  Auto Neutrophil % : x  Auto Lymphocyte % : x  Auto Monocyte % : x  Auto Eosinophil % : x  Auto Basophil % : x      06-27    135  |  105  |  15  ----------------------------<  89  3.7   |  26  |  0.74    Ca    8.5      27 Jun 2025 07:11      CAPILLARY BLOOD GLUCOSE              Creatinine Trend: 0.74<--, 0.80<--, 0.78<--, 1.13<--, 0.96<--, 0.91<--  I&O's Summary          Blood Blood-Peripheral  06-24 @ 16:20   No growth at 48 Hours  --  Klebsiella pneumoniae      Blood Blood-Peripheral  06-24 @ 16:10   No growth at 48 Hours  --  --          MEDICATIONS:    MEDICATIONS  (STANDING):  cefTRIAXone   IVPB      cefTRIAXone   IVPB 1000 milliGRAM(s) IV Intermittent every 24 hours  chlorhexidine 2% Cloths 1 Application(s) Topical <User Schedule>  enoxaparin Injectable 40 milliGRAM(s) SubCutaneous every 24 hours  fluticasone propionate 50 MICROgram(s)/spray Nasal Spray 1 Spray(s) Both Nostrils two times a day  hydrocortisone 25 milliGRAM(s) Oral every 12 hours  hydrocortisone   Oral   polyethylene glycol 3350 17 Gram(s) Oral at bedtime  senna 2 Tablet(s) Oral at bedtime  sodium chloride 1 Gram(s) Oral three times a day      MEDICATIONS  (PRN):  acetaminophen     Tablet .. 650 milliGRAM(s) Oral every 6 hours PRN Temp greater or equal to 38C (100.4F), Mild Pain (1 - 3)  OLANZapine 2.5 milliGRAM(s) Oral once PRN agitation  ondansetron    Tablet 4 milliGRAM(s) Oral daily PRN Nausea and/or Vomiting      REVIEW OF SYSTEMS:                           ALL ROS DONE [ X   ]    CONSTITUTIONAL:  LETHARGIC [   ], FEVER [   ], UNRESPONSIVE [   ]  CVS:  CP  [   ], SOB, [   ], PALPITATIONS [   ], DIZZYNESS [   ]  RS: COUGH [   ], SPUTUM [   ]  GI: ABDOMINAL PAIN [   ], NAUSEA [   ], VOMITINGS [   ], DIARRHEA [   ], CONSTIPATION [   ]  :  DYSURIA [   ], NOCTURIA [   ], INCREASED FREQUENCY [   ], DRIBLING [   ],  SKELETAL: PAINFUL JOINTS [   ], SWOLLEN JOINTS [   ], NECK ACHE [   ], LOW BACK ACHE [   ],  SKIN : ULCERS [   ], RASH [   ], ITCHING [   ]  CNS: HEAD ACHE [   ], DOUBLE VISION [   ], BLURRED VISION [   ], AMS / CONFUSION [   ], SEIZURES [   ], WEAKNESS [   ],TINGLING / NUMBNESS [   ]      PHYSICAL EXAMINATION:    GENERAL APPEARANCE: NO DISTRESS  HEENT:  NO PALLOR, NO  JVD,  NO   NODES, NECK SUPPLE  CVS: S1 +, S2 +,   RS: AEEB,  OCCASIONAL  RALES +,   NO RONCHI  ABD: SOFT, NT, NO, BS +  EXT: NO PE  SKIN: WARM,   SKELETAL:  ROM REDUCED AT CERVICAL & LS SPINE  CNS:  AAO X 3 ,   POOR POWER IN ALL LIMBS        RADIOLOGY :    < from: CT Chest w/ IV Cont (06.18.25 @ 14:22) >  IMPRESSION:  No bowel obstruction. Visualized appendix appears unremarkable. Collapse   mild hiatal hernia versus distal esophageal mural thickening. EGD may be   pursued for further evaluation. Possible focal mural thickening at the   cecum. Colonoscopy may be pursued to rule out colon cancer. Moderate to   large amount of stool in the rectum.    < end of copied text >  < from: CT Head No Cont (06.18.25 @ 14:17) >  IMPRESSION:    No acute intracranial  hemorrhage, edema or mass effect.    This study cannot exclude the possibility of a isodense mass or possible   metastatic disease. A follow-up MRI or CT head with contrast would be   necessary for improved sensitivity.    < end of copied text >  < from: CT Maxillofacial w/ IV Cont (06.12.23 @ 13:20) >  IMPRESSION:  No evidence of recurrent tumor.    Status post interval left mandibular molar extraction.    Otherwise stable exam.    < end of copied text >        ASSESSMENT :     hyponatremia   Chronic adrenal insufficiency  Hypertension, HLD  Melanoma   - DEPRESSION, ANXIETY NEUROSIS/PANIC ATTACKS        PLAN:  HPI:  65M PMH HTN, HLD, depression, and melanoma presenting with nausea and vomiting x 4 days. He states that he had URI symptoms one week ago and since then has had decreased appetite. He reported that he had nausea and vomiting since Saturday, where he had 3-5 episodes of NBNB vomiting. He has had poor po intake since Saturday due to being nervous about feeling nauseous after, but has been drinking two to three 8 oz cups of water each day. He denies any dizziness, lightheadedness, blurry vision, or abdominal pain. He does report that he noticed his speech was slower than normal and that he is more lethargic. He denies missing any doses of his medications, including his hydrocortisone 10mg BID. He denies fevers, chills, CP, SOB, diarrhea, dysuria, numbness/tingling/weakness in extremities. He lives at home alone and ambulates independently.       # DC PLAN - PT EVALUATION AND PLACEMENT IN [facility]     # SEPSIS SUSPECT S/T CYSTITIS  - NO COUGH, RHINORRHEA, CHEST PAIN, DYSPNEA, ABDOMINAL PAIN, N/V/C/D, RASHES, SWELLING  - STARTED ROCEPHIN, F/U BCX AND UCX [K. PNEUMONIAE PRE-BULLOCK]  - F/U CXR  - ID CONSULT    # SEVERE HYPONATREMIA - S/P ICU MONITORING, IV. NaCL 3%, IV. HYDROCORTISONE [TRANSITION TO ORAL]  # LIKELY SIADH - ON FLUID RESTRICTION 800 ML/DAY  # RESOLVING HYPOPHOSPHATEMIA  - ON SALT TABS PER NEPHROLOGY  - NEPHROLOGY CONSULT  - ENDOCRINOLOGY CONSULT    # RESOLVED ACUTE URTI AND RESOLVING HEADACHES - SYMPTOMATIC PAIN Rx AS NEEDED  - MAXILLARY SINUSITIS    # FECAL IMPACTION  # CONSTIPATION   - BOWEL REGIMEN   - TAP WATER ENEMA    # IMPAIRED GAIT DUE TO GENERALIZED MUSCLE WEAKNESS, CERVICAL & LS SPONDYLOPATHY, POLYARTHRITIS AND PERIPHERAL NEUROPATHY  # OP    # ADULT FAILURE TO THRIVE, SEVERE PROTEIN CALORIE MALNUTRITION - HIGH RISK FOR WORSENING MALNUTRITION     # HTN, HLD - ANTI HTN Rx ON HOLD DUE TO HYPOTENSION    # H/O ADRENAL INSUFFICIENCY - ON IV. HYDROCORTISONE 50 MG Q 12 HRS     # DEPRESSION, ANXIETY NEUROSIS - NOT ON ANY Rx AT HOME - GETS PSYCHOTHERAPY ALONE    # H/O MELANOMA    # GI AND DVT PROPHYLAXIS

## 2025-06-27 NOTE — PROGRESS NOTE ADULT - ASSESSMENT
1. Hyponatremia with unknown duration most likely chronic. Pt is clinically euvolemic. Multifactorial from High ADH state and Adrenal insuff.  -Na stable at 135; continue Nacl 1gm tid.    -low uric acid  -continue Fluid restriction 800ml to 1L/day.   -Check Seurm Na daily. Monitor I/O's daily. Avoid overcorrection of NA (8-10meq/day)  2. HTN:   -bp is low normal  -hold bp meds  -monitor bp.  3. h/o Adrenal inuff:  -on iv hydrocortisone  -Endo consulted.   4. Hypophosphatemia:  -phos improved.   -monitor phos  5. Fever possible UTI.   -on rocephin  -f/u urine culture.

## 2025-06-27 NOTE — PROGRESS NOTE ADULT - REASON FOR ADMISSION
severe hyponatremia, cortisol crisis

## 2025-06-27 NOTE — PROGRESS NOTE ADULT - PROVIDER SPECIALTY LIST ADULT
Endocrinology
Internal Medicine
Internal Medicine
Nephrology
Nephrology
Critical Care
Critical Care
Internal Medicine
Infectious Disease
Internal Medicine
Internal Medicine
Nephrology
Critical Care
Nephrology
Internal Medicine

## 2025-06-27 NOTE — PROGRESS NOTE ADULT - SUBJECTIVE AND OBJECTIVE BOX
NEPHROLOGY MEDICAL CARE, [facility] - Dr. Dequan Suresh/ Dr. Mary Lance/ Dr. Jean Paul Smalls/ Dr. Shawn Alvarez    Date of Service: 06-27-25    Patient was seen and examined at bedside.    CC: patient is okay and NAD    Vital Signs Last 24 Hrs  T(C): 37.1 (27 Jun 2025 13:00), Max: 37.1 (26 Jun 2025 20:24)  T(F): 98.8 (27 Jun 2025 13:00), Max: 98.8 (27 Jun 2025 13:00)  HR: 103 (27 Jun 2025 13:00) (76 - 104)  BP: 134/83 (27 Jun 2025 13:00) (134/83 - 142/82)  BP(mean): 100 (27 Jun 2025 13:00) (100 - 102)  RR: 18 (27 Jun 2025 13:00) (18 - 18)  SpO2: 96% (27 Jun 2025 13:00) (96% - 96%)    Parameters below as of 27 Jun 2025 13:00  Patient On (Oxygen Delivery Method): room air          PHYSICAL EXAM:  General: No acute respiratory distress.  Eyes: conjunctiva and sclera clear  ENMT: Atraumatic, Normocephalic, supple,   Respiratory: Bilateral clear lungs; No rales, rhonchi, wheezing  Cardiovascular: S1S2+; no m/r/g  Gastrointestinal: Soft, Non-tender, Nondistended; Bowel sounds present,   Neuro:  Awake, Alert & Oriented X3  Ext:   No edema, No Cyanosis  Skin: No visible rashes      MEDICATIONS:  MEDICATIONS  (STANDING):  cefTRIAXone   IVPB      cefTRIAXone   IVPB 1000 milliGRAM(s) IV Intermittent every 24 hours  chlorhexidine 2% Cloths 1 Application(s) Topical <User Schedule>  enoxaparin Injectable 40 milliGRAM(s) SubCutaneous every 24 hours  fluticasone propionate 50 MICROgram(s)/spray Nasal Spray 1 Spray(s) Both Nostrils two times a day  hydrocortisone 25 milliGRAM(s) Oral every 12 hours  hydrocortisone   Oral   polyethylene glycol 3350 17 Gram(s) Oral at bedtime  senna 2 Tablet(s) Oral at bedtime  sodium chloride 1 Gram(s) Oral three times a day    MEDICATIONS  (PRN):  acetaminophen     Tablet .. 650 milliGRAM(s) Oral every 6 hours PRN Temp greater or equal to 38C (100.4F), Mild Pain (1 - 3)  OLANZapine 2.5 milliGRAM(s) Oral once PRN agitation  ondansetron    Tablet 4 milliGRAM(s) Oral daily PRN Nausea and/or Vomiting          LABS:                        11.9   7.64  )-----------( 257      ( 27 Jun 2025 07:11 )             34.0     06-27    135  |  105  |  15  ----------------------------<  89  3.7   |  26  |  0.74    Ca    8.5      27 Jun 2025 07:11        Urinalysis Basic - ( 27 Jun 2025 07:11 )    Color: x / Appearance: x / SG: x / pH: x  Gluc: 89 mg/dL / Ketone: x  / Bili: x / Urobili: x   Blood: x / Protein: x / Nitrite: x   Leuk Esterase: x / RBC: x / WBC x   Sq Epi: x / Non Sq Epi: x / Bacteria: x        Urine studies    PTH and Vit D:

## 2025-06-27 NOTE — DISCHARGE NOTE NURSING/CASE MANAGEMENT/SOCIAL WORK - PATIENT PORTAL LINK FT
You can access the FollowMyHealth Patient Portal offered by [facility] by registering at the following website: [email or web address]. By joining [employer]’s FollowMyHealth portal, you will also be able to view your health information using other applications (apps) compatible with our system.

## 2025-06-29 LAB
CORTICOSTEROID BINDING GLOBULIN RESULT: 2.2 MG/DL — SIGNIFICANT CHANGE UP
CORTIS F/TOTAL MFR SERPL: 6.9 % — SIGNIFICANT CHANGE UP
CORTIS SERPL-MCNC: 11 UG/DL — SIGNIFICANT CHANGE UP
CORTISOL, FREE RESULT: 0.76 UG/DL — SIGNIFICANT CHANGE UP
CULTURE RESULTS: SIGNIFICANT CHANGE UP
CULTURE RESULTS: SIGNIFICANT CHANGE UP
SPECIMEN SOURCE: SIGNIFICANT CHANGE UP
SPECIMEN SOURCE: SIGNIFICANT CHANGE UP

## 2025-07-11 ENCOUNTER — NON-APPOINTMENT (OUTPATIENT)
Age: 65
End: 2025-07-11

## 2025-07-23 ENCOUNTER — APPOINTMENT (OUTPATIENT)
Dept: INTERNAL MEDICINE | Facility: CLINIC | Age: 65
End: 2025-07-23
Payer: MEDICARE

## 2025-07-23 VITALS
SYSTOLIC BLOOD PRESSURE: 128 MMHG | RESPIRATION RATE: 16 BRPM | HEART RATE: 103 BPM | OXYGEN SATURATION: 98 % | HEIGHT: 73 IN | DIASTOLIC BLOOD PRESSURE: 78 MMHG | TEMPERATURE: 97.6 F

## 2025-07-23 PROCEDURE — 36415 COLL VENOUS BLD VENIPUNCTURE: CPT

## 2025-07-23 PROCEDURE — 99213 OFFICE O/P EST LOW 20 MIN: CPT | Mod: 25

## 2025-07-24 ENCOUNTER — APPOINTMENT (OUTPATIENT)
Dept: HEMATOLOGY ONCOLOGY | Facility: CLINIC | Age: 65
End: 2025-07-24
Payer: MEDICARE

## 2025-07-24 ENCOUNTER — RESULT REVIEW (OUTPATIENT)
Age: 65
End: 2025-07-24

## 2025-07-24 VITALS
TEMPERATURE: 98.3 F | HEART RATE: 96 BPM | DIASTOLIC BLOOD PRESSURE: 83 MMHG | RESPIRATION RATE: 16 BRPM | OXYGEN SATURATION: 99 % | SYSTOLIC BLOOD PRESSURE: 132 MMHG | WEIGHT: 193.98 LBS | BODY MASS INDEX: 25.6 KG/M2

## 2025-07-24 DIAGNOSIS — I10 ESSENTIAL (PRIMARY) HYPERTENSION: ICD-10-CM

## 2025-07-24 DIAGNOSIS — C79.89 SECONDARY MALIGNANT NEOPLASM OF OTHER SPECIFIED SITES: ICD-10-CM

## 2025-07-24 DIAGNOSIS — D36.10 BENIGN NEOPLASM OF PERIPHERAL NERVES AND AUTONOMIC NERVOUS SYSTEM, UNSPECIFIED: ICD-10-CM

## 2025-07-24 DIAGNOSIS — E27.40 UNSPECIFIED ADRENOCORTICAL INSUFFICIENCY: ICD-10-CM

## 2025-07-24 DIAGNOSIS — E87.1 HYPO-OSMOLALITY AND HYPONATREMIA: ICD-10-CM

## 2025-07-24 DIAGNOSIS — E27.49 OTHER ADRENOCORTICAL INSUFFICIENCY: ICD-10-CM

## 2025-07-24 DIAGNOSIS — C43.30 MALIGNANT MELANOMA OF UNSPECIFIED PART OF FACE: ICD-10-CM

## 2025-07-24 LAB
BASOPHILS # BLD AUTO: 0.04 K/UL — SIGNIFICANT CHANGE UP (ref 0–0.2)
BASOPHILS NFR BLD AUTO: 0.8 % — SIGNIFICANT CHANGE UP (ref 0–2)
EOSINOPHIL # BLD AUTO: 0.19 K/UL — SIGNIFICANT CHANGE UP (ref 0–0.5)
EOSINOPHIL NFR BLD AUTO: 3.8 % — SIGNIFICANT CHANGE UP (ref 0–6)
HCT VFR BLD CALC: 36.6 % — LOW (ref 39–50)
HGB BLD-MCNC: 12.7 G/DL — LOW (ref 13–17)
IMM GRANULOCYTES NFR BLD AUTO: 0.2 % — SIGNIFICANT CHANGE UP (ref 0–0.9)
LYMPHOCYTES # BLD AUTO: 2.03 K/UL — SIGNIFICANT CHANGE UP (ref 1–3.3)
LYMPHOCYTES # BLD AUTO: 40.1 % — SIGNIFICANT CHANGE UP (ref 13–44)
MCHC RBC-ENTMCNC: 31.2 PG — SIGNIFICANT CHANGE UP (ref 27–34)
MCHC RBC-ENTMCNC: 34.7 G/DL — SIGNIFICANT CHANGE UP (ref 32–36)
MCV RBC AUTO: 89.9 FL — SIGNIFICANT CHANGE UP (ref 80–100)
MONOCYTES # BLD AUTO: 0.63 K/UL — SIGNIFICANT CHANGE UP (ref 0–0.9)
MONOCYTES NFR BLD AUTO: 12.5 % — SIGNIFICANT CHANGE UP (ref 2–14)
NEUTROPHILS # BLD AUTO: 2.16 K/UL — SIGNIFICANT CHANGE UP (ref 1.8–7.4)
NEUTROPHILS NFR BLD AUTO: 42.6 % — LOW (ref 43–77)
PLATELET # BLD AUTO: 198 K/UL — SIGNIFICANT CHANGE UP (ref 150–400)
RBC # BLD: 4.07 M/UL — LOW (ref 4.2–5.8)
RBC # FLD: 13.7 % — SIGNIFICANT CHANGE UP (ref 10.3–14.5)
WBC # BLD: 5.06 K/UL — SIGNIFICANT CHANGE UP (ref 3.8–10.5)
WBC # FLD AUTO: 5.06 K/UL — SIGNIFICANT CHANGE UP (ref 3.8–10.5)

## 2025-07-24 PROCEDURE — 99205 OFFICE O/P NEW HI 60 MIN: CPT

## 2025-07-24 PROCEDURE — G2211 COMPLEX E/M VISIT ADD ON: CPT

## 2025-07-25 LAB
ALBUMIN SERPL ELPH-MCNC: 4.2 G/DL
ALP BLD-CCNC: 87 U/L
ALT SERPL-CCNC: 19 U/L
ANION GAP SERPL CALC-SCNC: 13 MMOL/L
ANION GAP SERPL CALC-SCNC: 13 MMOL/L
AST SERPL-CCNC: 19 U/L
BILIRUB SERPL-MCNC: 0.4 MG/DL
BUN SERPL-MCNC: 20 MG/DL
BUN SERPL-MCNC: 23 MG/DL
CALCIUM SERPL-MCNC: 8.8 MG/DL
CALCIUM SERPL-MCNC: 9.2 MG/DL
CHLORIDE SERPL-SCNC: 105 MMOL/L
CHLORIDE SERPL-SCNC: 106 MMOL/L
CO2 SERPL-SCNC: 20 MMOL/L
CO2 SERPL-SCNC: 23 MMOL/L
CORTIS SERPL-MCNC: 21.5 UG/DL
CREAT SERPL-MCNC: 1.03 MG/DL
CREAT SERPL-MCNC: 1.08 MG/DL
EGFRCR SERPLBLD CKD-EPI 2021: 76 ML/MIN/1.73M2
EGFRCR SERPLBLD CKD-EPI 2021: 81 ML/MIN/1.73M2
GLUCOSE SERPL-MCNC: 120 MG/DL
POTASSIUM SERPL-SCNC: 4.1 MMOL/L
POTASSIUM SERPL-SCNC: 4.2 MMOL/L
PROT SERPL-MCNC: 6.6 G/DL
SODIUM SERPL-SCNC: 140 MMOL/L
SODIUM SERPL-SCNC: 142 MMOL/L
TSH SERPL-ACNC: 2.44 UIU/ML

## 2025-08-27 ENCOUNTER — APPOINTMENT (OUTPATIENT)
Dept: ENDOCRINOLOGY | Facility: CLINIC | Age: 65
End: 2025-08-27
Payer: MEDICARE

## 2025-08-27 VITALS
WEIGHT: 200 LBS | OXYGEN SATURATION: 95 % | DIASTOLIC BLOOD PRESSURE: 89 MMHG | HEIGHT: 73 IN | TEMPERATURE: 98.3 F | BODY MASS INDEX: 26.51 KG/M2 | SYSTOLIC BLOOD PRESSURE: 135 MMHG | HEART RATE: 99 BPM | RESPIRATION RATE: 16 BRPM

## 2025-08-27 DIAGNOSIS — E27.49 OTHER ADRENOCORTICAL INSUFFICIENCY: ICD-10-CM

## 2025-08-27 DIAGNOSIS — E03.8 OTHER SPECIFIED HYPOTHYROIDISM: ICD-10-CM

## 2025-08-27 PROCEDURE — G2211 COMPLEX E/M VISIT ADD ON: CPT

## 2025-08-27 PROCEDURE — 99214 OFFICE O/P EST MOD 30 MIN: CPT

## 2025-08-27 RX ORDER — HYDROCORTISONE SODIUM SUCCINATE 100 MG/2ML
100 INJECTION, POWDER, FOR SOLUTION INTRAMUSCULAR; INTRAVENOUS
Qty: 1 | Refills: 1 | Status: ACTIVE | COMMUNITY
Start: 2025-08-27 | End: 1900-01-01

## 2025-09-11 ENCOUNTER — TRANSCRIPTION ENCOUNTER (OUTPATIENT)
Age: 65
End: 2025-09-11